# Patient Record
Sex: FEMALE | Race: WHITE | Employment: OTHER | URBAN - METROPOLITAN AREA
[De-identification: names, ages, dates, MRNs, and addresses within clinical notes are randomized per-mention and may not be internally consistent; named-entity substitution may affect disease eponyms.]

---

## 2017-02-13 ENCOUNTER — OFFICE VISIT (OUTPATIENT)
Dept: PRIMARY CARE CLINIC | Age: 67
End: 2017-02-13

## 2017-02-13 VITALS
HEIGHT: 64 IN | HEART RATE: 68 BPM | SYSTOLIC BLOOD PRESSURE: 120 MMHG | RESPIRATION RATE: 16 BRPM | TEMPERATURE: 98 F | OXYGEN SATURATION: 98 % | WEIGHT: 169 LBS | BODY MASS INDEX: 28.85 KG/M2 | DIASTOLIC BLOOD PRESSURE: 80 MMHG

## 2017-02-13 DIAGNOSIS — R07.2 PRECORDIAL PAIN: ICD-10-CM

## 2017-02-13 DIAGNOSIS — E78.2 MIXED HYPERLIPIDEMIA: ICD-10-CM

## 2017-02-13 DIAGNOSIS — F41.1 GAD (GENERALIZED ANXIETY DISORDER): Primary | ICD-10-CM

## 2017-02-13 PROCEDURE — G8420 CALC BMI NORM PARAMETERS: HCPCS | Performed by: FAMILY MEDICINE

## 2017-02-13 PROCEDURE — 4040F PNEUMOC VAC/ADMIN/RCVD: CPT | Performed by: FAMILY MEDICINE

## 2017-02-13 PROCEDURE — 99213 OFFICE O/P EST LOW 20 MIN: CPT | Performed by: FAMILY MEDICINE

## 2017-02-13 PROCEDURE — 1123F ACP DISCUSS/DSCN MKR DOCD: CPT | Performed by: FAMILY MEDICINE

## 2017-02-13 PROCEDURE — G8427 DOCREV CUR MEDS BY ELIG CLIN: HCPCS | Performed by: FAMILY MEDICINE

## 2017-02-13 PROCEDURE — 3014F SCREEN MAMMO DOC REV: CPT | Performed by: FAMILY MEDICINE

## 2017-02-13 PROCEDURE — 1090F PRES/ABSN URINE INCON ASSESS: CPT | Performed by: FAMILY MEDICINE

## 2017-02-13 PROCEDURE — G8484 FLU IMMUNIZE NO ADMIN: HCPCS | Performed by: FAMILY MEDICINE

## 2017-02-13 PROCEDURE — G8399 PT W/DXA RESULTS DOCUMENT: HCPCS | Performed by: FAMILY MEDICINE

## 2017-02-13 PROCEDURE — 3017F COLORECTAL CA SCREEN DOC REV: CPT | Performed by: FAMILY MEDICINE

## 2017-02-13 PROCEDURE — 1036F TOBACCO NON-USER: CPT | Performed by: FAMILY MEDICINE

## 2017-02-13 PROCEDURE — G8598 ASA/ANTIPLAT THER USED: HCPCS | Performed by: FAMILY MEDICINE

## 2017-02-13 ASSESSMENT — ENCOUNTER SYMPTOMS: ABDOMINAL PAIN: 1

## 2017-02-25 ASSESSMENT — ENCOUNTER SYMPTOMS
SHORTNESS OF BREATH: 0
COUGH: 0

## 2017-05-13 ENCOUNTER — OFFICE VISIT (OUTPATIENT)
Dept: PRIMARY CARE CLINIC | Age: 67
End: 2017-05-13

## 2017-05-13 VITALS
BODY MASS INDEX: 29.02 KG/M2 | TEMPERATURE: 97.2 F | SYSTOLIC BLOOD PRESSURE: 112 MMHG | OXYGEN SATURATION: 99 % | RESPIRATION RATE: 16 BRPM | WEIGHT: 170 LBS | HEART RATE: 63 BPM | HEIGHT: 64 IN | DIASTOLIC BLOOD PRESSURE: 64 MMHG

## 2017-05-13 DIAGNOSIS — E78.00 PURE HYPERCHOLESTEROLEMIA: Primary | ICD-10-CM

## 2017-05-13 DIAGNOSIS — L73.9 FOLLICULITIS: ICD-10-CM

## 2017-05-13 DIAGNOSIS — E55.9 VITAMIN D DEFICIENCY: ICD-10-CM

## 2017-05-13 DIAGNOSIS — F41.1 GAD (GENERALIZED ANXIETY DISORDER): ICD-10-CM

## 2017-05-13 DIAGNOSIS — B35.4 TINEA CORPORIS: ICD-10-CM

## 2017-05-13 DIAGNOSIS — E78.00 PURE HYPERCHOLESTEROLEMIA: ICD-10-CM

## 2017-05-13 LAB
ALBUMIN SERPL-MCNC: 4.5 G/DL (ref 3.9–4.9)
ALP BLD-CCNC: 76 U/L (ref 40–130)
ALT SERPL-CCNC: 17 U/L (ref 0–33)
ANION GAP SERPL CALCULATED.3IONS-SCNC: 10 MEQ/L (ref 7–13)
AST SERPL-CCNC: 18 U/L (ref 0–35)
BILIRUB SERPL-MCNC: 0.7 MG/DL (ref 0–1.2)
BUN BLDV-MCNC: 11 MG/DL (ref 8–23)
CALCIUM SERPL-MCNC: 9.5 MG/DL (ref 8.6–10.2)
CHLORIDE BLD-SCNC: 99 MEQ/L (ref 98–107)
CHOLESTEROL, TOTAL: 225 MG/DL (ref 0–199)
CO2: 28 MEQ/L (ref 22–29)
CREAT SERPL-MCNC: 0.57 MG/DL (ref 0.5–0.9)
GFR AFRICAN AMERICAN: >60
GFR NON-AFRICAN AMERICAN: >60
GLOBULIN: 2.6 G/DL (ref 2.3–3.5)
GLUCOSE BLD-MCNC: 92 MG/DL (ref 74–109)
HDLC SERPL-MCNC: 56 MG/DL (ref 40–59)
LDL CHOLESTEROL CALCULATED: 142 MG/DL (ref 0–129)
POTASSIUM SERPL-SCNC: 4.3 MEQ/L (ref 3.5–5.1)
SODIUM BLD-SCNC: 137 MEQ/L (ref 132–144)
TOTAL PROTEIN: 7.1 G/DL (ref 6.4–8.1)
TRIGL SERPL-MCNC: 136 MG/DL (ref 0–200)
VITAMIN D 25-HYDROXY: 19.8 NG/ML (ref 30–100)

## 2017-05-13 PROCEDURE — 4040F PNEUMOC VAC/ADMIN/RCVD: CPT | Performed by: FAMILY MEDICINE

## 2017-05-13 PROCEDURE — G8420 CALC BMI NORM PARAMETERS: HCPCS | Performed by: FAMILY MEDICINE

## 2017-05-13 PROCEDURE — 1090F PRES/ABSN URINE INCON ASSESS: CPT | Performed by: FAMILY MEDICINE

## 2017-05-13 PROCEDURE — G8427 DOCREV CUR MEDS BY ELIG CLIN: HCPCS | Performed by: FAMILY MEDICINE

## 2017-05-13 PROCEDURE — 3017F COLORECTAL CA SCREEN DOC REV: CPT | Performed by: FAMILY MEDICINE

## 2017-05-13 PROCEDURE — G8399 PT W/DXA RESULTS DOCUMENT: HCPCS | Performed by: FAMILY MEDICINE

## 2017-05-13 PROCEDURE — G8598 ASA/ANTIPLAT THER USED: HCPCS | Performed by: FAMILY MEDICINE

## 2017-05-13 PROCEDURE — 1036F TOBACCO NON-USER: CPT | Performed by: FAMILY MEDICINE

## 2017-05-13 PROCEDURE — 99213 OFFICE O/P EST LOW 20 MIN: CPT | Performed by: FAMILY MEDICINE

## 2017-05-13 PROCEDURE — 3014F SCREEN MAMMO DOC REV: CPT | Performed by: FAMILY MEDICINE

## 2017-05-13 PROCEDURE — 1123F ACP DISCUSS/DSCN MKR DOCD: CPT | Performed by: FAMILY MEDICINE

## 2017-05-13 RX ORDER — ATORVASTATIN CALCIUM 20 MG/1
20 TABLET, FILM COATED ORAL DAILY
Status: ON HOLD | COMMUNITY
Start: 2017-04-18 | End: 2017-12-05

## 2017-05-13 RX ORDER — CLOTRIMAZOLE AND BETAMETHASONE DIPROPIONATE 10; .64 MG/G; MG/G
CREAM TOPICAL
Qty: 60 G | Refills: 1 | Status: SHIPPED | OUTPATIENT
Start: 2017-05-13 | End: 2017-10-18

## 2017-05-13 RX ORDER — CEPHALEXIN 500 MG/1
CAPSULE ORAL
Qty: 40 CAPSULE | Refills: 1 | Status: SHIPPED | OUTPATIENT
Start: 2017-05-13 | End: 2017-10-18

## 2017-05-13 RX ORDER — LORAZEPAM 1 MG/1
TABLET ORAL
Qty: 90 TABLET | Refills: 1 | Status: SHIPPED | OUTPATIENT
Start: 2017-05-13 | End: 2017-07-18 | Stop reason: SDUPTHER

## 2017-05-13 RX ORDER — LORAZEPAM 1 MG/1
TABLET ORAL
Qty: 90 TABLET | Refills: 1 | Status: SHIPPED | OUTPATIENT
Start: 2017-05-13 | End: 2017-05-13 | Stop reason: SDUPTHER

## 2017-05-13 ASSESSMENT — ENCOUNTER SYMPTOMS
WHEEZING: 0
CHEST TIGHTNESS: 0
SHORTNESS OF BREATH: 0

## 2017-05-15 ENCOUNTER — TELEPHONE (OUTPATIENT)
Dept: PRIMARY CARE CLINIC | Age: 67
End: 2017-05-15

## 2017-05-18 ENCOUNTER — TELEPHONE (OUTPATIENT)
Dept: PRIMARY CARE CLINIC | Age: 67
End: 2017-05-18

## 2017-07-13 ENCOUNTER — TELEPHONE (OUTPATIENT)
Dept: PRIMARY CARE CLINIC | Age: 67
End: 2017-07-13

## 2017-07-13 RX ORDER — KETOROLAC TROMETHAMINE 10 MG/1
10 TABLET, FILM COATED ORAL EVERY 6 HOURS PRN
Qty: 20 TABLET | Refills: 0 | Status: ON HOLD | OUTPATIENT
Start: 2017-07-13 | End: 2017-12-05

## 2017-07-18 ENCOUNTER — OFFICE VISIT (OUTPATIENT)
Dept: PRIMARY CARE CLINIC | Age: 67
End: 2017-07-18

## 2017-07-18 VITALS
BODY MASS INDEX: 28.34 KG/M2 | HEART RATE: 60 BPM | DIASTOLIC BLOOD PRESSURE: 72 MMHG | TEMPERATURE: 96.9 F | WEIGHT: 166 LBS | RESPIRATION RATE: 16 BRPM | HEIGHT: 64 IN | SYSTOLIC BLOOD PRESSURE: 116 MMHG

## 2017-07-18 DIAGNOSIS — Z12.31 SCREENING MAMMOGRAM, ENCOUNTER FOR: ICD-10-CM

## 2017-07-18 DIAGNOSIS — M51.36 DDD (DEGENERATIVE DISC DISEASE), LUMBAR: ICD-10-CM

## 2017-07-18 DIAGNOSIS — F41.1 GAD (GENERALIZED ANXIETY DISORDER): ICD-10-CM

## 2017-07-18 DIAGNOSIS — Z98.1 S/P CERVICAL SPINAL FUSION: ICD-10-CM

## 2017-07-18 DIAGNOSIS — M50.30 DDD (DEGENERATIVE DISC DISEASE), CERVICAL: Primary | ICD-10-CM

## 2017-07-18 PROCEDURE — 1123F ACP DISCUSS/DSCN MKR DOCD: CPT | Performed by: FAMILY MEDICINE

## 2017-07-18 PROCEDURE — G8427 DOCREV CUR MEDS BY ELIG CLIN: HCPCS | Performed by: FAMILY MEDICINE

## 2017-07-18 PROCEDURE — 4040F PNEUMOC VAC/ADMIN/RCVD: CPT | Performed by: FAMILY MEDICINE

## 2017-07-18 PROCEDURE — 1036F TOBACCO NON-USER: CPT | Performed by: FAMILY MEDICINE

## 2017-07-18 PROCEDURE — G8399 PT W/DXA RESULTS DOCUMENT: HCPCS | Performed by: FAMILY MEDICINE

## 2017-07-18 PROCEDURE — G8598 ASA/ANTIPLAT THER USED: HCPCS | Performed by: FAMILY MEDICINE

## 2017-07-18 PROCEDURE — 99214 OFFICE O/P EST MOD 30 MIN: CPT | Performed by: FAMILY MEDICINE

## 2017-07-18 PROCEDURE — 3014F SCREEN MAMMO DOC REV: CPT | Performed by: FAMILY MEDICINE

## 2017-07-18 PROCEDURE — 3017F COLORECTAL CA SCREEN DOC REV: CPT | Performed by: FAMILY MEDICINE

## 2017-07-18 PROCEDURE — G8419 CALC BMI OUT NRM PARAM NOF/U: HCPCS | Performed by: FAMILY MEDICINE

## 2017-07-18 PROCEDURE — 1090F PRES/ABSN URINE INCON ASSESS: CPT | Performed by: FAMILY MEDICINE

## 2017-07-18 RX ORDER — NAPROXEN 500 MG/1
TABLET ORAL
Refills: 0 | COMMUNITY
Start: 2017-07-13 | End: 2017-11-14

## 2017-07-18 RX ORDER — LORAZEPAM 1 MG/1
TABLET ORAL
Qty: 90 TABLET | Refills: 1 | Status: SHIPPED | OUTPATIENT
Start: 2017-07-18 | End: 2017-10-18 | Stop reason: SDUPTHER

## 2017-07-18 ASSESSMENT — ENCOUNTER SYMPTOMS
CONSTIPATION: 0
PHOTOPHOBIA: 0
EYES NEGATIVE: 1
DIARRHEA: 0
SHORTNESS OF BREATH: 0
RESPIRATORY NEGATIVE: 1
WHEEZING: 0
ABDOMINAL PAIN: 0
BACK PAIN: 0
GASTROINTESTINAL NEGATIVE: 1
EYE REDNESS: 0
EYE ITCHING: 0

## 2017-10-18 ENCOUNTER — OFFICE VISIT (OUTPATIENT)
Dept: PRIMARY CARE CLINIC | Age: 67
End: 2017-10-18

## 2017-10-18 VITALS
RESPIRATION RATE: 18 BRPM | HEIGHT: 64 IN | BODY MASS INDEX: 27.83 KG/M2 | OXYGEN SATURATION: 99 % | TEMPERATURE: 97.9 F | DIASTOLIC BLOOD PRESSURE: 68 MMHG | SYSTOLIC BLOOD PRESSURE: 110 MMHG | HEART RATE: 78 BPM | WEIGHT: 163 LBS

## 2017-10-18 DIAGNOSIS — L30.9 ECZEMA, UNSPECIFIED TYPE: ICD-10-CM

## 2017-10-18 DIAGNOSIS — F41.1 GAD (GENERALIZED ANXIETY DISORDER): Primary | ICD-10-CM

## 2017-10-18 DIAGNOSIS — G40.909 NONINTRACTABLE EPILEPSY WITHOUT STATUS EPILEPTICUS, UNSPECIFIED EPILEPSY TYPE (HCC): ICD-10-CM

## 2017-10-18 DIAGNOSIS — Z23 NEED FOR INFLUENZA VACCINATION: ICD-10-CM

## 2017-10-18 PROCEDURE — 1090F PRES/ABSN URINE INCON ASSESS: CPT | Performed by: FAMILY MEDICINE

## 2017-10-18 PROCEDURE — 1036F TOBACCO NON-USER: CPT | Performed by: FAMILY MEDICINE

## 2017-10-18 PROCEDURE — 1123F ACP DISCUSS/DSCN MKR DOCD: CPT | Performed by: FAMILY MEDICINE

## 2017-10-18 PROCEDURE — 3017F COLORECTAL CA SCREEN DOC REV: CPT | Performed by: FAMILY MEDICINE

## 2017-10-18 PROCEDURE — G8399 PT W/DXA RESULTS DOCUMENT: HCPCS | Performed by: FAMILY MEDICINE

## 2017-10-18 PROCEDURE — G8417 CALC BMI ABV UP PARAM F/U: HCPCS | Performed by: FAMILY MEDICINE

## 2017-10-18 PROCEDURE — G8427 DOCREV CUR MEDS BY ELIG CLIN: HCPCS | Performed by: FAMILY MEDICINE

## 2017-10-18 PROCEDURE — G8598 ASA/ANTIPLAT THER USED: HCPCS | Performed by: FAMILY MEDICINE

## 2017-10-18 PROCEDURE — 99213 OFFICE O/P EST LOW 20 MIN: CPT | Performed by: FAMILY MEDICINE

## 2017-10-18 PROCEDURE — 3014F SCREEN MAMMO DOC REV: CPT | Performed by: FAMILY MEDICINE

## 2017-10-18 PROCEDURE — 90662 IIV NO PRSV INCREASED AG IM: CPT | Performed by: FAMILY MEDICINE

## 2017-10-18 PROCEDURE — G0008 ADMIN INFLUENZA VIRUS VAC: HCPCS | Performed by: FAMILY MEDICINE

## 2017-10-18 PROCEDURE — 4040F PNEUMOC VAC/ADMIN/RCVD: CPT | Performed by: FAMILY MEDICINE

## 2017-10-18 PROCEDURE — G8484 FLU IMMUNIZE NO ADMIN: HCPCS | Performed by: FAMILY MEDICINE

## 2017-10-18 RX ORDER — LORAZEPAM 1 MG/1
TABLET ORAL
Qty: 90 TABLET | Refills: 1 | Status: SHIPPED | OUTPATIENT
Start: 2017-10-18 | End: 2018-02-20 | Stop reason: SDUPTHER

## 2017-10-18 RX ORDER — CLOBETASOL PROPIONATE 0.5 MG/G
CREAM TOPICAL
Qty: 30 G | Refills: 1 | Status: SHIPPED | OUTPATIENT
Start: 2017-10-18 | End: 2018-03-22

## 2017-10-18 ASSESSMENT — ENCOUNTER SYMPTOMS
ABDOMINAL PAIN: 0
GASTROINTESTINAL NEGATIVE: 1
EYE PAIN: 0
BACK PAIN: 0
RHINORRHEA: 0
SORE THROAT: 0
VOMITING: 0
SHORTNESS OF BREATH: 0
PHOTOPHOBIA: 0
EYES NEGATIVE: 1
CONSTIPATION: 0
EYE ITCHING: 0
NAIL CHANGES: 0
RESPIRATORY NEGATIVE: 1
COUGH: 0
EYE REDNESS: 0
WHEEZING: 0
DIARRHEA: 0

## 2017-10-18 NOTE — PROGRESS NOTES
Subjective:      Patient ID: Shree Marcelino is a 78 y.o. female who presents today for:  Chief Complaint   Patient presents with    Rash     x over 1 month pt has c/o rash on her left ring finger that has not changed        Rash   This is a recurrent problem. The current episode started more than 1 month ago. The problem is unchanged. The affected locations include the left fingers. The rash is characterized by itchiness and redness. She was exposed to nothing. Pertinent negatives include no anorexia, congestion, cough, diarrhea, eye pain, facial edema, fatigue, fever, joint pain, nail changes, rhinorrhea, shortness of breath, sore throat or vomiting. Treatments tried: [place]. The treatment provided mild relief. Past Medical History:   Diagnosis Date    Allergic rhinitis, Dr Jose Mendez 10/28/2013    Anxiety     Back pain     CAD (coronary artery disease)     Cat bite involving extremity 11/5/2013    Cat bite involving extremity 11/5/2013    Cellulitis 11/5/2013    Cellulitis, improving 11/5/2013    Cervical radiculitis 1/13/2014    Cervical radiculitis, surgery pending 1/13/2014    CTS (carpal tunnel syndrome), Ortho 1/13/2014    Dyslipidemia 10/28/2013    Fatigue 10/7/2013    Headache(784.0)     H/O MIGRANES    Hernia, hiatal     H/O     Hx of cardiac cath x 2, normal, last 9/13 10/7/2013    Osteoarthritis     S/P cervical spinal fusion, 5-6 1/13/2014    Seizure disorder ([facility] [address]     Sinusitis 10/28/2013    Tobacco abuse, quit 9/3/13 10/7/2013    Tobacco abuse, quit 9/3/13      Past Surgical History:   Procedure Laterality Date    APPENDECTOMY      CARDIAC CATHETERIZATION  9/5/13    DR. SERRANO   [address] SURGERY  1995    fusion    CHOLECYSTECTOMY      COLONOSCOPY  2009    COSMETIC SURGERY  2010    on eye    TUBAL LIGATION  1976     Family History   Problem Relation Age of Onset    Heart Disease Father      MI    High Blood Pressure Father     Depression Mother      Social History     Social History    Marital status:      Spouse name: N/A    Number of children: N/A    Years of education: N/A     Occupational History    Not on file. Social History Main Topics    Smoking status: Former Smoker     Types: Cigarettes     Quit date: 9/6/2013    Smokeless tobacco: Never Used    Alcohol use No    Drug use: Unknown    Sexual activity: Not on file     Other Topics Concern    Not on file     Social History Narrative    No narrative on file     Allergies:  Keflex [cephalexin]; Nickel; Simvastatin; and Ultram [tramadol hcl]    Review of Systems   Constitutional: Negative. Negative for activity change, appetite change, fatigue and fever. HENT: Negative. Negative for congestion, rhinorrhea and sore throat. Eyes: Negative. Negative for photophobia, pain, redness and itching. Respiratory: Negative. Negative for cough, shortness of breath and wheezing. Cardiovascular: Negative. Gastrointestinal: Negative. Negative for abdominal pain, anorexia, constipation, diarrhea and vomiting. Genitourinary: Negative. Negative for hematuria, pelvic pain and urgency. Musculoskeletal: Positive for arthralgias. Negative for back pain and joint pain. Skin: Positive for rash. Negative for nail changes. Neurological: Negative. Psychiatric/Behavioral: Negative. Negative for agitation and behavioral problems. The patient is not nervous/anxious. Objective:   /68 (Site: Right Arm, Position: Sitting, Cuff Size: Medium Adult)   Pulse 78   Temp 97.9 °F (36.6 °C) (Tympanic)   Resp 18   Ht 5' 4\" (1.626 m)   Wt 163 lb (73.9 kg)   LMP  (LMP Unknown)   SpO2 99%   BMI 27.98 kg/m²     Physical Exam   Constitutional: She is oriented to person, place, and time. She appears well-developed and well-nourished. HENT:   Head: Normocephalic and atraumatic. Eyes: Conjunctivae and EOM are normal. Pupils are equal, round, and reactive to light.    Neck: Normal range of motion. Neck supple. No thyromegaly present. Cardiovascular: Normal rate, regular rhythm and normal heart sounds. No murmur heard. Pulmonary/Chest: Effort normal and breath sounds normal. She has no wheezes. She exhibits no tenderness. Abdominal: Soft. Bowel sounds are normal. There is no tenderness. Musculoskeletal: Normal range of motion. She exhibits no edema or tenderness. Lymphadenopathy:     She has no cervical adenopathy. Neurological: She is alert and oriented to person, place, and time. She has normal reflexes. Coordination normal.   Skin: Skin is warm and dry. Rash noted. There is erythema. Eczematous/scaling rash with papules noted to the left index finger   Psychiatric: She has a normal mood and affect. Thought content normal.   Nursing note and vitals reviewed. Assessment:     1. PADMINI (generalized anxiety disorder)  LORazepam (ATIVAN) 1 MG tablet   2. Need for influenza vaccination  INFLUENZA, HIGH DOSE, 65 YRS +, IM, PF, PREFILL SYR, 0.5ML (FLUZONE HD)   3. Eczema, unspecified type  clobetasol (TEMOVATE) 0.05 % cream       Plan:      Orders Placed This Encounter   Procedures    INFLUENZA, HIGH DOSE, 65 YRS +, IM, PF, PREFILL SYR, 0.5ML (FLUZONE HD)     Orders Placed This Encounter   Medications    LORazepam (ATIVAN) 1 MG tablet     Sig: One tid     Dispense:  90 tablet     Refill:  1    clobetasol (TEMOVATE) 0.05 % cream     Sig: Apply topically 2 times daily. Dispense:  30 g     Refill:  1       Controlled Substances Monitoring:      Return in about 6 weeks (around 11/29/2017) for Routine follow up, Review of [facility]. Tiki LAW ([facility]), am scribing for and in the presence of Drew Owusu DO. Electronically signed by :  Sarahy Tolentino ([address] [place] [address])      Drew LAW DO, personally performed the services described in this documentation, as scribed by Roula Deluna in my presence, and it is both accurate and complete.

## 2017-11-14 ENCOUNTER — OFFICE VISIT (OUTPATIENT)
Dept: PRIMARY CARE CLINIC | Age: 67
End: 2017-11-14

## 2017-11-14 VITALS
RESPIRATION RATE: 14 BRPM | HEART RATE: 64 BPM | BODY MASS INDEX: 27.66 KG/M2 | SYSTOLIC BLOOD PRESSURE: 110 MMHG | HEIGHT: 64 IN | TEMPERATURE: 97.9 F | WEIGHT: 162 LBS | DIASTOLIC BLOOD PRESSURE: 80 MMHG

## 2017-11-14 DIAGNOSIS — Z01.818 PRE-OP EXAM: Primary | ICD-10-CM

## 2017-11-14 DIAGNOSIS — M15.9 PRIMARY OSTEOARTHRITIS INVOLVING MULTIPLE JOINTS: ICD-10-CM

## 2017-11-14 DIAGNOSIS — F41.1 GAD (GENERALIZED ANXIETY DISORDER): ICD-10-CM

## 2017-11-14 DIAGNOSIS — D69.9 BLEEDING DISORDER (HCC): ICD-10-CM

## 2017-11-14 DIAGNOSIS — M17.12 PRIMARY OSTEOARTHRITIS OF LEFT KNEE: ICD-10-CM

## 2017-11-14 DIAGNOSIS — M51.36 DDD (DEGENERATIVE DISC DISEASE), LUMBAR: ICD-10-CM

## 2017-11-14 DIAGNOSIS — Z01.818 PRE-OP EXAM: ICD-10-CM

## 2017-11-14 LAB
ALBUMIN SERPL-MCNC: 4.7 G/DL (ref 3.9–4.9)
ALP BLD-CCNC: 70 U/L (ref 40–130)
ALT SERPL-CCNC: 11 U/L (ref 0–33)
ANION GAP SERPL CALCULATED.3IONS-SCNC: 18 MEQ/L (ref 7–13)
APTT: 28 SEC (ref 21.6–35.4)
AST SERPL-CCNC: 16 U/L (ref 0–35)
BASOPHILS ABSOLUTE: 0 K/UL (ref 0–0.2)
BASOPHILS RELATIVE PERCENT: 0.9 %
BILIRUB SERPL-MCNC: 0.6 MG/DL (ref 0–1.2)
BILIRUBIN, POC: NORMAL
BLOOD URINE, POC: NORMAL
BUN BLDV-MCNC: 10 MG/DL (ref 8–23)
C-REACTIVE PROTEIN: 0.9 MG/L (ref 0–5)
CALCIUM SERPL-MCNC: 9.8 MG/DL (ref 8.6–10.2)
CHLORIDE BLD-SCNC: 102 MEQ/L (ref 98–107)
CLARITY, POC: CLEAR
CO2: 23 MEQ/L (ref 22–29)
COLOR, POC: YELLOW
CREAT SERPL-MCNC: 0.61 MG/DL (ref 0.5–0.9)
EOSINOPHILS ABSOLUTE: 0.1 K/UL (ref 0–0.7)
EOSINOPHILS RELATIVE PERCENT: 1.7 %
GFR AFRICAN AMERICAN: >60
GFR NON-AFRICAN AMERICAN: >60
GLOBULIN: 2.5 G/DL (ref 2.3–3.5)
GLUCOSE BLD-MCNC: 99 MG/DL (ref 74–109)
GLUCOSE URINE, POC: NORMAL
HCT VFR BLD CALC: 39.7 % (ref 37–47)
HEMOGLOBIN: 13 G/DL (ref 12–16)
INR BLD: 1
KETONES, POC: NORMAL
LEUKOCYTE EST, POC: NORMAL
LYMPHOCYTES ABSOLUTE: 2.5 K/UL (ref 1–4.8)
LYMPHOCYTES RELATIVE PERCENT: 44.7 %
MCH RBC QN AUTO: 29.3 PG (ref 27–31.3)
MCHC RBC AUTO-ENTMCNC: 32.6 % (ref 33–37)
MCV RBC AUTO: 89.8 FL (ref 82–100)
MONOCYTES ABSOLUTE: 0.4 K/UL (ref 0.2–0.8)
MONOCYTES RELATIVE PERCENT: 7.1 %
NEUTROPHILS ABSOLUTE: 2.6 K/UL (ref 1.4–6.5)
NEUTROPHILS RELATIVE PERCENT: 45.6 %
NITRITE, POC: NORMAL
PDW BLD-RTO: 13.5 % (ref 11.5–14.5)
PH, POC: 6
PLATELET # BLD: 263 K/UL (ref 130–400)
POTASSIUM SERPL-SCNC: 4.4 MEQ/L (ref 3.5–5.1)
PROTEIN, POC: NORMAL
PROTHROMBIN TIME: 10.6 SEC (ref 8.1–13.7)
RBC # BLD: 4.42 M/UL (ref 4.2–5.4)
SODIUM BLD-SCNC: 143 MEQ/L (ref 132–144)
SPECIFIC GRAVITY, POC: 1.01
TOTAL PROTEIN: 7.2 G/DL (ref 6.4–8.1)
UROBILINOGEN, POC: NORMAL
WBC # BLD: 5.6 K/UL (ref 4.8–10.8)

## 2017-11-14 PROCEDURE — G8427 DOCREV CUR MEDS BY ELIG CLIN: HCPCS | Performed by: FAMILY MEDICINE

## 2017-11-14 PROCEDURE — 1123F ACP DISCUSS/DSCN MKR DOCD: CPT | Performed by: FAMILY MEDICINE

## 2017-11-14 PROCEDURE — G8399 PT W/DXA RESULTS DOCUMENT: HCPCS | Performed by: FAMILY MEDICINE

## 2017-11-14 PROCEDURE — 1090F PRES/ABSN URINE INCON ASSESS: CPT | Performed by: FAMILY MEDICINE

## 2017-11-14 PROCEDURE — G8484 FLU IMMUNIZE NO ADMIN: HCPCS | Performed by: FAMILY MEDICINE

## 2017-11-14 PROCEDURE — 4040F PNEUMOC VAC/ADMIN/RCVD: CPT | Performed by: FAMILY MEDICINE

## 2017-11-14 PROCEDURE — 99214 OFFICE O/P EST MOD 30 MIN: CPT | Performed by: FAMILY MEDICINE

## 2017-11-14 PROCEDURE — 3017F COLORECTAL CA SCREEN DOC REV: CPT | Performed by: FAMILY MEDICINE

## 2017-11-14 PROCEDURE — G8417 CALC BMI ABV UP PARAM F/U: HCPCS | Performed by: FAMILY MEDICINE

## 2017-11-14 PROCEDURE — 81003 URINALYSIS AUTO W/O SCOPE: CPT | Performed by: FAMILY MEDICINE

## 2017-11-14 PROCEDURE — 1036F TOBACCO NON-USER: CPT | Performed by: FAMILY MEDICINE

## 2017-11-14 PROCEDURE — G8598 ASA/ANTIPLAT THER USED: HCPCS | Performed by: FAMILY MEDICINE

## 2017-11-14 PROCEDURE — 3014F SCREEN MAMMO DOC REV: CPT | Performed by: FAMILY MEDICINE

## 2017-11-14 ASSESSMENT — ENCOUNTER SYMPTOMS
NAUSEA: 0
SINUS PRESSURE: 0
SHORTNESS OF BREATH: 0
ABDOMINAL PAIN: 0
RESPIRATORY NEGATIVE: 1
DIARRHEA: 0
SORE THROAT: 0
VOMITING: 0
COUGH: 0
CONSTIPATION: 0
BACK PAIN: 0
WHEEZING: 0

## 2017-11-14 NOTE — PROGRESS NOTES
Subjective:      Patient ID: Cheryl Munoz is a 78 y.o. female who presents today for:  Chief Complaint   Patient presents with    Pre-op Exam     Pt. is here for pre admission testing for a total knee replacement on her left knee with Dr. Dionne Chiang on 12/05/2017 at [facility]. Pt. had an EKG done last week with her Cardiologist Dr. Amy Koenig.  Immunizations     Pt. is questioning if she can receive the Pneumonia shot today. HPI  Patient presents in the office for a pre op exam. Patient is having a total left knee replacement  on 12/5/2017 with Dr. Dionne Chiang at [facility]   Patient states she has not had a heart attack in the past 6 months. Patient has not had blood transfusions. Patient has no problems with any type of anesthesia. Patient denies any rashes   Patient was counseled on medications to discontinue 10 days before and until specified after her surgery. Past Medical History:   Diagnosis Date    Allergic rhinitis, Dr Bocanegra Fore 10/28/2013    Anxiety     Back pain     CAD (coronary artery disease)     Cat bite involving extremity 11/5/2013    Cat bite involving extremity 11/5/2013    Cellulitis 11/5/2013    Cellulitis, improving 11/5/2013    Cervical radiculitis 1/13/2014    Cervical radiculitis, surgery pending 1/13/2014    CTS (carpal tunnel syndrome), Ortho 1/13/2014    Dyslipidemia 10/28/2013    Fatigue 10/7/2013    Headache(784.0)     H/O MIGRANES    Hernia, hiatal     H/O     Hx of cardiac cath x 2, normal, last 9/13 10/7/2013    Osteoarthritis     S/P cervical spinal fusion, 5-6 1/13/2014    Seizure disorder ([facility] [address]     Sinusitis 10/28/2013    Tobacco abuse, quit 9/3/13 10/7/2013    Tobacco abuse, quit 9/3/13      Past Surgical History:   Procedure Laterality Date    APPENDECTOMY      CARDIAC CATHETERIZATION  9/5/13    DR. SERRANO   [address] [facility] SURGERY  1995    fusion    CHOLECYSTECTOMY      COLONOSCOPY  2009    COSMETIC SURGERY  2010    on eye    TUBAL LIGATION 1976     Family History   Problem Relation Age of Onset    Heart Disease Father      MI    High Blood Pressure Father     Depression Mother      Social History     Social History    Marital status:      Spouse name: N/A    Number of children: N/A    Years of education: N/A     Occupational History    Not on file. Social History Main Topics    Smoking status: Former Smoker     Types: Cigarettes     Quit date: 9/6/2013    Smokeless tobacco: Never Used    Alcohol use No    Drug use: Unknown    Sexual activity: Not on file     Other Topics Concern    Not on file     Social History Narrative    No narrative on file     Allergies:  Keflex [cephalexin]; Nickel; Simvastatin; and Ultram [tramadol hcl]    Review of Systems   Constitutional: Negative for chills, fatigue and fever. HENT: Negative for congestion, ear pain, sinus pressure and sore throat. Respiratory: Negative. Negative for cough, shortness of breath and wheezing. Cardiovascular: Negative for chest pain and palpitations. Gastrointestinal: Negative for abdominal pain, constipation, diarrhea, nausea and vomiting. Musculoskeletal: Positive for arthralgias and gait problem. Negative for back pain, joint swelling and neck pain. Neurological: Negative for dizziness and headaches. Objective:   /80 (Site: Left Arm, Position: Sitting, Cuff Size: Large Adult)   Pulse 64   Temp 97.9 °F (36.6 °C) (Oral)   Resp 14   Ht 5' 4\" (1.626 m)   Wt 162 lb (73.5 kg)   LMP  (LMP Unknown)   BMI 27.81 kg/m²     Physical Exam   Constitutional: She is oriented to person, place, and time. She appears well-developed and well-nourished. HENT:   Head: Normocephalic and atraumatic. Eyes: Conjunctivae and EOM are normal. Pupils are equal, round, and reactive to light. Neck: Normal range of motion. Neck supple. No thyromegaly present. Cardiovascular: Normal rate, regular rhythm and normal heart sounds.     No murmur Return in about 3 months (around 2/14/2018) for Review of Labs & Diagnostic Testing, Routine follow up. I, Severo Bickers ([address] [place] [address])  , am scribing for and in the presence of Brenda Daniels DO. Electronically signed by :  Severo Bickers ([address] [place] [address])      Edel Hartley DO, personally performed the services described in this documentation, as scribed by Jerri Contreras in my presence, and it is both accurate and complete.  Electronically signed by: Brenda Daniels DO    11/14/17 10:30 PM    Brenda Daniels DO

## 2017-12-04 ENCOUNTER — ANESTHESIA EVENT (OUTPATIENT)
Dept: OPERATING ROOM | Age: 67
DRG: 470 | End: 2017-12-04
Payer: MEDICARE

## 2017-12-04 NOTE — ANESTHESIA PRE PROCEDURE
Department of Anesthesiology  Preprocedure Note       Name:  Radha Room   Age:  78 y.o.  :  1950                                          MRN:  [id number]         Date:  2017      Surgeon: Ramon Miller):  Jose Cotton MD    Procedure: Procedure(s):  LEFT TOTAL KNEE ARTHROPLASTY SUPINE BELEN PSI SPINAL (OUTSIDE PAT DR. OLIVA)    Medications prior to admission:   Prior to Admission medications    Medication Sig Start Date End Date Taking? Authorizing Provider   LORazepam (ATIVAN) 1 MG tablet One tid 10/18/17   Ellen Job, DO   clobetasol (TEMOVATE) 0.05 % cream Apply topically 2 times daily. 10/18/17   Ellen Job, DO   ketorolac (TORADOL) 10 MG tablet Take 1 tablet by mouth every 6 hours as needed for Pain 17  Ellen Job, DO   atorvastatin (LIPITOR) 20 MG tablet Take 20 mg by mouth daily 17   Historical Provider, MD   vitamin D (ERGOCALCIFEROL) 31184 UNITS CAPS capsule Take 1 capsule by mouth once a week. 4/16/15   Ellen Job, DO   aspirin 81 MG tablet Take 81 mg by mouth daily. Historical Provider, MD       Current medications:    No current facility-administered medications for this encounter. Current Outpatient Prescriptions   Medication Sig Dispense Refill    LORazepam (ATIVAN) 1 MG tablet One tid 90 tablet 1    clobetasol (TEMOVATE) 0.05 % cream Apply topically 2 times daily. 30 g 1    ketorolac (TORADOL) 10 MG tablet Take 1 tablet by mouth every 6 hours as needed for Pain 20 tablet 0    atorvastatin (LIPITOR) 20 MG tablet Take 20 mg by mouth daily      vitamin D (ERGOCALCIFEROL) 82818 UNITS CAPS capsule Take 1 capsule by mouth once a week. 12 capsule 0    aspirin 81 MG tablet Take 81 mg by mouth daily. Allergies:     Allergies   Allergen Reactions    Keflex [Cephalexin] Nausea Only    Nickel     Simvastatin      Other reaction(s): Unknown    Ultram [Tramadol Hcl]        Problem List:    Patient Active Problem List   Diagnosis Code  Osteoarthritis M19.90    Headache R51    Hernia, hiatal K44.9    Anxiety F41.9    FH: CAD (coronary artery disease) Z82.49    Hx of cardiac cath x 2, normal, last 9/13 Z98.890    Fatigue R53.83    Sinusitis J32.9    Allergic rhinitis, Dr Beatrice Arriola J30.9    Dyslipidemia E78.5    Cat bite involving extremity [id number]    Cellulitis, improving L03.90    CAD (coronary artery disease) I25.10    S/P cervical spinal fusion, 5-6 Z98.1    CTS (carpal tunnel syndrome), Ortho G56.00    Cervical radiculitis, surgery pending M54.12    History of tobacco use Z87.891    PADMINI (generalized anxiety disorder) F41.1    DDD (degenerative disc disease), lumbar M51.36       Past Medical History:        Diagnosis Date    Allergic rhinitis, Dr Beatrice Arriola 10/28/2013    Anxiety     Back pain     CAD (coronary artery disease)     Cat bite involving extremity 11/5/2013    Cat bite involving extremity 11/5/2013    Cellulitis 11/5/2013    Cellulitis, improving 11/5/2013    Cervical radiculitis 1/13/2014    Cervical radiculitis, surgery pending 1/13/2014    CTS (carpal tunnel syndrome), Ortho 1/13/2014    Dyslipidemia 10/28/2013    Fatigue 10/7/2013    Headache(784.0)     H/O MIGRANES    Hernia, hiatal     H/O     Hx of cardiac cath x 2, normal, last 9/13 10/7/2013    Osteoarthritis     S/P cervical spinal fusion, 5-6 1/13/2014    Seizure disorder ([address]     Sinusitis 10/28/2013    Tobacco abuse, quit 9/3/13 10/7/2013    Tobacco abuse, quit 9/3/13        Past Surgical History:        Procedure Laterality Date    APPENDECTOMY      CARDIAC CATHETERIZATION  9/5/13    DR. SERRANO   [address] [facility] SURGERY  1995    fusion    CHOLECYSTECTOMY      COLONOSCOPY  2009    COSMETIC SURGERY  2010    on eye    TUBAL LIGATION  1976       Social History:    Social History   Substance Use Topics    Smoking status: Former Smoker     Types: Cigarettes     Quit date: 9/6/2013    Smokeless tobacco: Never Used    Alcohol use No Counseling given: Not Answered      Vital Signs (Current): There were no vitals filed for this visit. BP Readings from Last 3 Encounters:   11/14/17 110/80   10/18/17 110/68   07/18/17 116/72       NPO Status:                                                                                 BMI:   Wt Readings from Last 3 Encounters:   11/14/17 162 lb (73.5 kg)   10/18/17 163 lb (73.9 kg)   07/18/17 166 lb (75.3 kg)     There is no height or weight on file to calculate BMI.    CBC:   Lab Results   Component Value Date    WBC 5.6 11/14/2017    RBC 4.42 11/14/2017    RBC 4.21 09/29/2011    HGB 13.0 11/14/2017    HCT 39.7 11/14/2017    MCV 89.8 11/14/2017    RDW 13.5 11/14/2017     11/14/2017       CMP:   Lab Results   Component Value Date     11/14/2017    K 4.4 11/14/2017     11/14/2017    CO2 23 11/14/2017    BUN 10 11/14/2017    CREATININE 0.61 11/14/2017    GFRAA >60.0 11/14/2017    LABGLOM >60.0 11/14/2017    GLUCOSE 99 11/14/2017    GLUCOSE 81 09/29/2011    PROT 7.2 11/14/2017    CALCIUM 9.8 11/14/2017    BILITOT 0.6 11/14/2017    ALKPHOS 70 11/14/2017    AST 16 11/14/2017    ALT 11 11/14/2017       POC Tests: No results for input(s): POCGLU, POCNA, POCK, POCCL, POCBUN, POCHEMO, POCHCT in the last 72 hours.     Coags:   Lab Results   Component Value Date    PROTIME 10.6 11/14/2017    PROTIME 9.7 09/30/2011    INR 1.0 11/14/2017    APTT 28.0 11/14/2017       HCG (If Applicable): No results found for: PREGTESTUR, PREGSERUM, HCG, HCGQUANT     ABGs: No results found for: PHART, PO2ART, EPN0BJT, QPD9BBA, BEART, I9JTPOJU     Type & Screen (If Applicable):  No results found for: [facility]    Anesthesia Evaluation  Patient summary reviewed and Nursing notes reviewed no history of anesthetic complications:   Airway: Mallampati: II  TM distance: >3 FB   Neck ROM: full  Mouth opening: > = 3 FB Dental:    (+) upper dentures

## 2017-12-05 ENCOUNTER — HOSPITAL ENCOUNTER (INPATIENT)
Age: 67
LOS: 3 days | Discharge: HOME | DRG: 470 | End: 2017-12-08
Attending: ORTHOPAEDIC SURGERY | Admitting: ORTHOPAEDIC SURGERY
Payer: MEDICARE

## 2017-12-05 ENCOUNTER — APPOINTMENT (OUTPATIENT)
Dept: GENERAL RADIOLOGY | Age: 67
DRG: 470 | End: 2017-12-05
Attending: ORTHOPAEDIC SURGERY
Payer: MEDICARE

## 2017-12-05 ENCOUNTER — ANESTHESIA (OUTPATIENT)
Dept: OPERATING ROOM | Age: 67
DRG: 470 | End: 2017-12-05
Payer: MEDICARE

## 2017-12-05 VITALS — TEMPERATURE: 95.4 F | DIASTOLIC BLOOD PRESSURE: 51 MMHG | OXYGEN SATURATION: 100 % | SYSTOLIC BLOOD PRESSURE: 96 MMHG

## 2017-12-05 LAB
ABO/RH: NORMAL
ANTIBODY SCREEN: NORMAL

## 2017-12-05 PROCEDURE — 6360000002 HC RX W HCPCS: Performed by: ANESTHESIOLOGY

## 2017-12-05 PROCEDURE — 2580000003 HC RX 258: Performed by: ORTHOPAEDIC SURGERY

## 2017-12-05 PROCEDURE — 51702 INSERT TEMP BLADDER CATH: CPT

## 2017-12-05 PROCEDURE — A6402 STERILE GAUZE <= 16 SQ IN: HCPCS | Performed by: ORTHOPAEDIC SURGERY

## 2017-12-05 PROCEDURE — 2580000003 HC RX 258: Performed by: STUDENT IN AN ORGANIZED HEALTH CARE EDUCATION/TRAINING PROGRAM

## 2017-12-05 PROCEDURE — 6360000002 HC RX W HCPCS: Performed by: ORTHOPAEDIC SURGERY

## 2017-12-05 PROCEDURE — 64445 NJX AA&/STRD SCIATIC NRV IMG: CPT | Performed by: ANESTHESIOLOGY

## 2017-12-05 PROCEDURE — 1210000000 HC MED SURG R&B

## 2017-12-05 PROCEDURE — 3700000001 HC ADD 15 MINUTES (ANESTHESIA): Performed by: ORTHOPAEDIC SURGERY

## 2017-12-05 PROCEDURE — 2580000003 HC RX 258: Performed by: NURSE ANESTHETIST, CERTIFIED REGISTERED

## 2017-12-05 PROCEDURE — 3700000000 HC ANESTHESIA ATTENDED CARE: Performed by: ORTHOPAEDIC SURGERY

## 2017-12-05 PROCEDURE — C1713 ANCHOR/SCREW BN/BN,TIS/BN: HCPCS | Performed by: ORTHOPAEDIC SURGERY

## 2017-12-05 PROCEDURE — 7100000000 HC PACU RECOVERY - FIRST 15 MIN: Performed by: ORTHOPAEDIC SURGERY

## 2017-12-05 PROCEDURE — 3600000014 HC SURGERY LEVEL 4 ADDTL 15MIN: Performed by: ORTHOPAEDIC SURGERY

## 2017-12-05 PROCEDURE — 6360000002 HC RX W HCPCS: Performed by: NURSE ANESTHETIST, CERTIFIED REGISTERED

## 2017-12-05 PROCEDURE — 7100000001 HC PACU RECOVERY - ADDTL 15 MIN: Performed by: ORTHOPAEDIC SURGERY

## 2017-12-05 PROCEDURE — 0SRD069 REPLACEMENT OF LEFT KNEE JOINT WITH OXIDIZED ZIRCONIUM ON POLYETHYLENE SYNTHETIC SUBSTITUTE, CEMENTED, OPEN APPROACH: ICD-10-PCS | Performed by: ORTHOPAEDIC SURGERY

## 2017-12-05 PROCEDURE — G8978 MOBILITY CURRENT STATUS: HCPCS

## 2017-12-05 PROCEDURE — 6370000000 HC RX 637 (ALT 250 FOR IP): Performed by: ORTHOPAEDIC SURGERY

## 2017-12-05 PROCEDURE — 73560 X-RAY EXAM OF KNEE 1 OR 2: CPT

## 2017-12-05 PROCEDURE — 86850 RBC ANTIBODY SCREEN: CPT

## 2017-12-05 PROCEDURE — 3600000004 HC SURGERY LEVEL 4 BASE: Performed by: ORTHOPAEDIC SURGERY

## 2017-12-05 PROCEDURE — 97530 THERAPEUTIC ACTIVITIES: CPT

## 2017-12-05 PROCEDURE — C1776 JOINT DEVICE (IMPLANTABLE): HCPCS | Performed by: ORTHOPAEDIC SURGERY

## 2017-12-05 PROCEDURE — 2500000003 HC RX 250 WO HCPCS: Performed by: NURSE ANESTHETIST, CERTIFIED REGISTERED

## 2017-12-05 PROCEDURE — 87086 URINE CULTURE/COLONY COUNT: CPT

## 2017-12-05 PROCEDURE — 86901 BLOOD TYPING SEROLOGIC RH(D): CPT

## 2017-12-05 PROCEDURE — G8979 MOBILITY GOAL STATUS: HCPCS

## 2017-12-05 PROCEDURE — 86900 BLOOD TYPING SEROLOGIC ABO: CPT

## 2017-12-05 PROCEDURE — 36415 COLL VENOUS BLD VENIPUNCTURE: CPT

## 2017-12-05 PROCEDURE — 97162 PT EVAL MOD COMPLEX 30 MIN: CPT

## 2017-12-05 PROCEDURE — 2720000010 HC SURG SUPPLY STERILE: Performed by: ORTHOPAEDIC SURGERY

## 2017-12-05 DEVICE — CEMENT BNE 20ML 40GM ACRYL RESIN HI VISC RADPQ FAST SET: Type: IMPLANTABLE DEVICE | Status: FUNCTIONAL

## 2017-12-05 DEVICE — IMPLANTABLE DEVICE: Type: IMPLANTABLE DEVICE | Status: FUNCTIONAL

## 2017-12-05 DEVICE — BONE SCREW 6.5X30 SELF-TAP: Type: IMPLANTABLE DEVICE | Status: FUNCTIONAL

## 2017-12-05 DEVICE — NEXGEN PRECOAT STEMMED TIBIAL PLATE SZ 4: Type: IMPLANTABLE DEVICE | Status: FUNCTIONAL

## 2017-12-05 RX ORDER — PROPOFOL 10 MG/ML
INJECTION, EMULSION INTRAVENOUS CONTINUOUS PRN
Status: DISCONTINUED | OUTPATIENT
Start: 2017-12-05 | End: 2017-12-05 | Stop reason: SDUPTHER

## 2017-12-05 RX ORDER — MIDAZOLAM HYDROCHLORIDE 1 MG/ML
INJECTION INTRAMUSCULAR; INTRAVENOUS PRN
Status: DISCONTINUED | OUTPATIENT
Start: 2017-12-05 | End: 2017-12-05 | Stop reason: SDUPTHER

## 2017-12-05 RX ORDER — SODIUM CHLORIDE 9 MG/ML
INJECTION, SOLUTION INTRAVENOUS
Status: DISPENSED
Start: 2017-12-05 | End: 2017-12-05

## 2017-12-05 RX ORDER — KETOROLAC TROMETHAMINE 15 MG/ML
15 INJECTION, SOLUTION INTRAMUSCULAR; INTRAVENOUS EVERY 6 HOURS
Status: COMPLETED | OUTPATIENT
Start: 2017-12-05 | End: 2017-12-05

## 2017-12-05 RX ORDER — DEXAMETHASONE SODIUM PHOSPHATE 10 MG/ML
INJECTION, SOLUTION INTRAMUSCULAR; INTRAVENOUS
Status: DISPENSED
Start: 2017-12-05 | End: 2017-12-05

## 2017-12-05 RX ORDER — MIDAZOLAM HYDROCHLORIDE 1 MG/ML
INJECTION INTRAMUSCULAR; INTRAVENOUS
Status: DISPENSED
Start: 2017-12-05 | End: 2017-12-05

## 2017-12-05 RX ORDER — POLYMYXIN B 500000 [USP'U]/1
INJECTION, POWDER, LYOPHILIZED, FOR SOLUTION INTRAMUSCULAR; INTRATHECAL; INTRAVENOUS; OPHTHALMIC
Status: DISCONTINUED
Start: 2017-12-05 | End: 2017-12-05 | Stop reason: WASHOUT

## 2017-12-05 RX ORDER — LIDOCAINE HYDROCHLORIDE 10 MG/ML
INJECTION, SOLUTION INFILTRATION; PERINEURAL
Status: DISPENSED
Start: 2017-12-05 | End: 2017-12-05

## 2017-12-05 RX ORDER — SODIUM CHLORIDE 0.9 % (FLUSH) 0.9 %
10 SYRINGE (ML) INJECTION PRN
Status: DISCONTINUED | OUTPATIENT
Start: 2017-12-05 | End: 2017-12-05 | Stop reason: HOSPADM

## 2017-12-05 RX ORDER — DEXAMETHASONE SODIUM PHOSPHATE 10 MG/ML
INJECTION INTRAMUSCULAR; INTRAVENOUS PRN
Status: DISCONTINUED | OUTPATIENT
Start: 2017-12-05 | End: 2017-12-05 | Stop reason: SDUPTHER

## 2017-12-05 RX ORDER — TRANEXAMIC ACID 650 1/1
1300 TABLET ORAL EVERY 8 HOURS
Status: COMPLETED | OUTPATIENT
Start: 2017-12-05 | End: 2017-12-05

## 2017-12-05 RX ORDER — ASPIRIN 81 MG/1
81 TABLET ORAL 2 TIMES DAILY
Status: DISCONTINUED | OUTPATIENT
Start: 2017-12-06 | End: 2017-12-08 | Stop reason: HOSPADM

## 2017-12-05 RX ORDER — DOCUSATE SODIUM 100 MG/1
100 CAPSULE, LIQUID FILLED ORAL 2 TIMES DAILY
Status: DISCONTINUED | OUTPATIENT
Start: 2017-12-05 | End: 2017-12-08 | Stop reason: HOSPADM

## 2017-12-05 RX ORDER — SODIUM CHLORIDE 9 MG/ML
INJECTION, SOLUTION INTRAVENOUS CONTINUOUS
Status: DISCONTINUED | OUTPATIENT
Start: 2017-12-05 | End: 2017-12-06

## 2017-12-05 RX ORDER — SENNA AND DOCUSATE SODIUM 50; 8.6 MG/1; MG/1
1 TABLET, FILM COATED ORAL DAILY
Status: DISCONTINUED | OUTPATIENT
Start: 2017-12-05 | End: 2017-12-08 | Stop reason: HOSPADM

## 2017-12-05 RX ORDER — SODIUM CHLORIDE 0.9 % (FLUSH) 0.9 %
10 SYRINGE (ML) INJECTION EVERY 12 HOURS SCHEDULED
Status: DISCONTINUED | OUTPATIENT
Start: 2017-12-05 | End: 2017-12-05 | Stop reason: HOSPADM

## 2017-12-05 RX ORDER — BISACODYL 10 MG
10 SUPPOSITORY, RECTAL RECTAL DAILY PRN
Status: DISCONTINUED | OUTPATIENT
Start: 2017-12-05 | End: 2017-12-08 | Stop reason: HOSPADM

## 2017-12-05 RX ORDER — LIDOCAINE HYDROCHLORIDE 10 MG/ML
INJECTION, SOLUTION INFILTRATION; PERINEURAL PRN
Status: DISCONTINUED | OUTPATIENT
Start: 2017-12-05 | End: 2017-12-05 | Stop reason: SDUPTHER

## 2017-12-05 RX ORDER — ROPIVACAINE HYDROCHLORIDE 5 MG/ML
INJECTION, SOLUTION EPIDURAL; INFILTRATION; PERINEURAL PRN
Status: DISCONTINUED | OUTPATIENT
Start: 2017-12-05 | End: 2017-12-05 | Stop reason: SDUPTHER

## 2017-12-05 RX ORDER — SODIUM CHLORIDE, SODIUM LACTATE, POTASSIUM CHLORIDE, CALCIUM CHLORIDE 600; 310; 30; 20 MG/100ML; MG/100ML; MG/100ML; MG/100ML
INJECTION, SOLUTION INTRAVENOUS CONTINUOUS
Status: DISCONTINUED | OUTPATIENT
Start: 2017-12-05 | End: 2017-12-06

## 2017-12-05 RX ORDER — SODIUM CHLORIDE 0.9 % (FLUSH) 0.9 %
10 SYRINGE (ML) INJECTION EVERY 12 HOURS SCHEDULED
Status: DISCONTINUED | OUTPATIENT
Start: 2017-12-05 | End: 2017-12-08 | Stop reason: HOSPADM

## 2017-12-05 RX ORDER — LORAZEPAM 0.5 MG/1
0.5 TABLET ORAL 3 TIMES DAILY PRN
Status: DISCONTINUED | OUTPATIENT
Start: 2017-12-05 | End: 2017-12-06

## 2017-12-05 RX ORDER — LIDOCAINE HYDROCHLORIDE 10 MG/ML
1 INJECTION, SOLUTION EPIDURAL; INFILTRATION; INTRACAUDAL; PERINEURAL
Status: DISCONTINUED | OUTPATIENT
Start: 2017-12-05 | End: 2017-12-05 | Stop reason: HOSPADM

## 2017-12-05 RX ORDER — ONDANSETRON 2 MG/ML
4 INJECTION INTRAMUSCULAR; INTRAVENOUS EVERY 6 HOURS PRN
Status: DISCONTINUED | OUTPATIENT
Start: 2017-12-05 | End: 2017-12-08 | Stop reason: HOSPADM

## 2017-12-05 RX ORDER — SODIUM CHLORIDE, SODIUM LACTATE, POTASSIUM CHLORIDE, CALCIUM CHLORIDE 600; 310; 30; 20 MG/100ML; MG/100ML; MG/100ML; MG/100ML
INJECTION, SOLUTION INTRAVENOUS CONTINUOUS PRN
Status: DISCONTINUED | OUTPATIENT
Start: 2017-12-05 | End: 2017-12-05 | Stop reason: SDUPTHER

## 2017-12-05 RX ORDER — ACETAMINOPHEN 325 MG/1
650 TABLET ORAL EVERY 4 HOURS PRN
Status: DISCONTINUED | OUTPATIENT
Start: 2017-12-05 | End: 2017-12-08 | Stop reason: HOSPADM

## 2017-12-05 RX ORDER — MAGNESIUM HYDROXIDE 1200 MG/15ML
LIQUID ORAL CONTINUOUS PRN
Status: DISCONTINUED | OUTPATIENT
Start: 2017-12-05 | End: 2017-12-05 | Stop reason: HOSPADM

## 2017-12-05 RX ORDER — ROPIVACAINE HYDROCHLORIDE 5 MG/ML
INJECTION, SOLUTION EPIDURAL; INFILTRATION; PERINEURAL
Status: DISPENSED
Start: 2017-12-05 | End: 2017-12-05

## 2017-12-05 RX ORDER — OXYCODONE HYDROCHLORIDE AND ACETAMINOPHEN 5; 325 MG/1; MG/1
2 TABLET ORAL EVERY 4 HOURS PRN
Status: DISCONTINUED | OUTPATIENT
Start: 2017-12-05 | End: 2017-12-07

## 2017-12-05 RX ORDER — OXYCODONE HYDROCHLORIDE AND ACETAMINOPHEN 5; 325 MG/1; MG/1
1 TABLET ORAL EVERY 4 HOURS PRN
Status: DISCONTINUED | OUTPATIENT
Start: 2017-12-05 | End: 2017-12-07

## 2017-12-05 RX ORDER — SODIUM CHLORIDE 0.9 % (FLUSH) 0.9 %
10 SYRINGE (ML) INJECTION PRN
Status: DISCONTINUED | OUTPATIENT
Start: 2017-12-05 | End: 2017-12-08 | Stop reason: HOSPADM

## 2017-12-05 RX ORDER — MORPHINE SULFATE 2 MG/ML
2 INJECTION, SOLUTION INTRAMUSCULAR; INTRAVENOUS
Status: DISCONTINUED | OUTPATIENT
Start: 2017-12-05 | End: 2017-12-07

## 2017-12-05 RX ORDER — MORPHINE SULFATE 4 MG/ML
4 INJECTION, SOLUTION INTRAMUSCULAR; INTRAVENOUS
Status: DISCONTINUED | OUTPATIENT
Start: 2017-12-05 | End: 2017-12-07

## 2017-12-05 RX ADMIN — DEXAMETHASONE SODIUM PHOSPHATE 10 MG: 10 INJECTION INTRAMUSCULAR; INTRAVENOUS at 07:38

## 2017-12-05 RX ADMIN — DOCUSATE SODIUM 100 MG: 100 CAPSULE, LIQUID FILLED ORAL at 21:12

## 2017-12-05 RX ADMIN — ONDANSETRON 4 MG: 2 INJECTION INTRAMUSCULAR; INTRAVENOUS at 13:34

## 2017-12-05 RX ADMIN — DOCUSATE SODIUM 100 MG: 100 CAPSULE, LIQUID FILLED ORAL at 12:34

## 2017-12-05 RX ADMIN — TRANEXAMIC ACID 1300 MG: 650 TABLET ORAL at 08:20

## 2017-12-05 RX ADMIN — PROPOFOL 75 MCG/KG/MIN: 10 INJECTION, EMULSION INTRAVENOUS at 07:46

## 2017-12-05 RX ADMIN — STANDARDIZED SENNA CONCENTRATE AND DOCUSATE SODIUM 1 TABLET: 8.6; 5 TABLET, FILM COATED ORAL at 12:34

## 2017-12-05 RX ADMIN — SODIUM CHLORIDE, POTASSIUM CHLORIDE, SODIUM LACTATE AND CALCIUM CHLORIDE: 600; 310; 30; 20 INJECTION, SOLUTION INTRAVENOUS at 08:15

## 2017-12-05 RX ADMIN — VANCOMYCIN HYDROCHLORIDE 1 G: 1 INJECTION, POWDER, LYOPHILIZED, FOR SOLUTION INTRAVENOUS at 07:55

## 2017-12-05 RX ADMIN — TRANEXAMIC ACID 1300 MG: 650 TABLET ORAL at 08:17

## 2017-12-05 RX ADMIN — LIDOCAINE HYDROCHLORIDE 5 ML: 10 INJECTION, SOLUTION INFILTRATION; PERINEURAL at 07:46

## 2017-12-05 RX ADMIN — SODIUM CHLORIDE, POTASSIUM CHLORIDE, SODIUM LACTATE AND CALCIUM CHLORIDE: 600; 310; 30; 20 INJECTION, SOLUTION INTRAVENOUS at 07:44

## 2017-12-05 RX ADMIN — PHENYLEPHRINE HYDROCHLORIDE 50 MCG: 10 INJECTION INTRAVENOUS at 08:40

## 2017-12-05 RX ADMIN — MIDAZOLAM HYDROCHLORIDE 4 MG: 1 INJECTION, SOLUTION INTRAMUSCULAR; INTRAVENOUS at 07:34

## 2017-12-05 RX ADMIN — MORPHINE SULFATE 4 MG: 4 INJECTION INTRAVENOUS at 13:34

## 2017-12-05 RX ADMIN — BUPIVACAINE HYDROCHLORIDE 2 ML: 5 INJECTION, SOLUTION EPIDURAL; INTRACAUDAL at 07:51

## 2017-12-05 RX ADMIN — VANCOMYCIN HYDROCHLORIDE 1000 MG: 1 INJECTION, POWDER, LYOPHILIZED, FOR SOLUTION INTRAVENOUS at 21:07

## 2017-12-05 RX ADMIN — ROPIVACAINE HYDROCHLORIDE 20 ML: 5 INJECTION, SOLUTION EPIDURAL; INFILTRATION; PERINEURAL at 07:38

## 2017-12-05 RX ADMIN — MORPHINE SULFATE 4 MG: 4 INJECTION INTRAVENOUS at 17:16

## 2017-12-05 RX ADMIN — Medication 10 ML: at 12:35

## 2017-12-05 RX ADMIN — KETOROLAC TROMETHAMINE 15 MG: 15 INJECTION, SOLUTION INTRAMUSCULAR; INTRAVENOUS at 12:34

## 2017-12-05 RX ADMIN — SODIUM CHLORIDE: 9 INJECTION, SOLUTION INTRAVENOUS at 12:35

## 2017-12-05 RX ADMIN — MORPHINE SULFATE 4 MG: 4 INJECTION INTRAVENOUS at 21:21

## 2017-12-05 RX ADMIN — KETOROLAC TROMETHAMINE 15 MG: 15 INJECTION, SOLUTION INTRAMUSCULAR; INTRAVENOUS at 17:08

## 2017-12-05 ASSESSMENT — PULMONARY FUNCTION TESTS
PIF_VALUE: 1
PIF_VALUE: 0
PIF_VALUE: 1
PIF_VALUE: 0
PIF_VALUE: 1
PIF_VALUE: 0
PIF_VALUE: 1

## 2017-12-05 ASSESSMENT — PAIN DESCRIPTION - ORIENTATION
ORIENTATION: LEFT

## 2017-12-05 ASSESSMENT — PAIN SCALES - GENERAL
PAINLEVEL_OUTOF10: 6
PAINLEVEL_OUTOF10: 7
PAINLEVEL_OUTOF10: 5
PAINLEVEL_OUTOF10: 7
PAINLEVEL_OUTOF10: 6
PAINLEVEL_OUTOF10: 5
PAINLEVEL_OUTOF10: 0
PAINLEVEL_OUTOF10: 5
PAINLEVEL_OUTOF10: 4
PAINLEVEL_OUTOF10: 8
PAINLEVEL_OUTOF10: 7
PAINLEVEL_OUTOF10: 2
PAINLEVEL_OUTOF10: 7

## 2017-12-05 ASSESSMENT — PAIN DESCRIPTION - LOCATION
LOCATION: KNEE

## 2017-12-05 ASSESSMENT — PAIN DESCRIPTION - PAIN TYPE
TYPE: SURGICAL PAIN

## 2017-12-05 ASSESSMENT — PAIN DESCRIPTION - DESCRIPTORS
DESCRIPTORS: SHARP

## 2017-12-05 ASSESSMENT — PAIN - FUNCTIONAL ASSESSMENT: PAIN_FUNCTIONAL_ASSESSMENT: 0-10

## 2017-12-05 NOTE — BRIEF OP NOTE
Brief Postoperative Note  ______________________________________________________________    Patient: Phill Stevens  YOB: 1950  MRN: [id number]  Date of Procedure: 12/5/2017    Pre-Op Diagnosis: LEFT KNEE DEGENERATIVE JOINT DISEASE    Post-Op Diagnosis: Same       Procedure(s):  LEFT TOTAL KNEE ARTHROPLASTY SUPINE BELEN PSI SPINAL (OUTSIDE [facility] DR. OLIVA)    Anesthesia: [unfilled]    Surgeon(s):  Jeffrey Stinson MD    Staff:  Scrub Person First: Early Mask  Physician Assistant: Dionicio Wolfe PA-C     Estimated Blood Loss: * No values recorded between 12/5/2017  7:45 AM and 12/5/2017  9:15 AM * None    Complications: None    Specimens:   ID Type Source Tests Collected by Time Destination   1 : urine Body Fluid Bladder URINE CULTURE, URINALYSIS Jeffrey Stinson MD 12/5/2017 1908        Implants:    Implant Name Type Inv.  Item Serial No.  Lot No. LRB No. Used   CEMENT FULL DOSE SIMPLEX HV Fastener CEMENT FULL DOSE SIMPLEX HV  JONEL: ORTHOPAED[employer] [id number] Left 2   FEMORAL COMPONENT     [id number] Left 1   STEMMED TIBIAL COMPONENT Knee IMPL KNEE TIB STEM NEXGEN SZ 4 [id number]  [employer] [id number] Left 1   PATELLA Knee IMPL KNEE NEXGEN PROLONG ALL POLY PATELLA  26MM  [employer] [id number] Left 1   ARTICULAR SURFACE Knee IMPL KNEE NEXGEN LPS FLEX FIXED PROLONG ART SAMINA G 3 4  10MM   [facility] [id number] Left 1         Drains:   Urethral Catheter Non-latex 16 fr (Active)   Catheter Indications Perioperative use in selected surgeries including but not limited to urologic, pelvic or need for intraoperative monitoring of urinary output due to prolonged surgery, large volume infusion or need for diuretic therapy in surgery 12/5/2017  7:50 AM   Urine Color Yellow 12/5/2017  7:50 AM   Urine Appearance Clear 12/5/2017  7:50 AM       Findings: none    Jeffrey Stinson MD  Date: 12/5/2017  Time: 9:15 AM

## 2017-12-05 NOTE — ANESTHESIA PROCEDURE NOTES
Peripheral Block    Patient location during procedure: pre-op  Start time: 12/5/2017 7:34 AM  End time: 12/5/2017 7:39 AM  Staffing  Anesthesiologist: Mitzy Iraheta  Performed: anesthesiologist   Preanesthetic Checklist  Completed: patient identified, site marked, surgical consent, pre-op evaluation, timeout performed, IV checked, risks and benefits discussed, monitors and equipment checked, anesthesia consent given, oxygen available and patient being monitored  Peripheral Block  Patient position: supine  Prep: ChloraPrep  Patient monitoring: cardiac monitor, continuous pulse ox, frequent blood pressure checks and IV access  Block type: Sciatic and Saphenous  Laterality: left  Injection technique: single-shot  Procedures: ultrasound guided and Patient supine with leg externally rotated. Subsartorial technique with lateral, in-plane approach  Local infiltration: lidocaine  Infiltration strength: 1 %  Dose: 2 mL  Provider prep: mask and sterile gloves  Local infiltration: lidocaine  Needle  Needle type: combined needle/nerve stimulator   Needle gauge: 21 G  Needle length: 10 cm  Needle localization: ultrasound guidance  Assessment  Injection assessment: negative aspiration for heme, no paresthesia on injection and local visualized surrounding nerve on ultrasound  Paresthesia pain: none  Slow fractionated injection: yes  Hemodynamics: stable  Additional Notes  Total of 20cc of 0.5% ropivicaine with 10mg decadron injected.   Reason for block: post-op pain management and at surgeon's request

## 2017-12-05 NOTE — PROGRESS NOTES
Chart reviewed. Patient was admitted to [facility] after [place]. This  met with patient to discuss DC planning. Upon visit patient is alert and sitting up in recliner chair. Patient appears well groomed and dressed in hospital attire. Patient is alert and oriented to person, place, and situation. Patient reports living home alone and reports that bed room and bath are on second level. Patient reports that she desires going to a SNF to receive therapies upon DC from [facility]. Patient reports that she met with Katelyn Villarreal in admissions department at [facility] prior to surgery. Patient reports desire to transfer to [facility] upon [place] from [facility].  It is worth noting that patient was evaluated by PT at [facility] and PT yara noted recommended short [facility] stay due to pt living alone and bed and bathroom on 2nd floor and laundry in basement. This  collaborated with Katelyn Villarreal in admissions department at [facility]. Katelyn Villarreal reports that patient requires a 3 midnight stay before patient would be able to transfer to a SNF. Katelyn Villarreal requesting patient's information be faxed over on 12/6 for review. SS to follow.

## 2017-12-05 NOTE — PROGRESS NOTES
Patient admitted to floor with left total knee replacement, following commands, a/o x4. Able to make needs known, assessment as charted, dressing dry and intact. Polar pack in place. Call light in reach.

## 2017-12-05 NOTE — PROGRESS NOTES
Physical Therapy    Facility/Department: [facility] MED SURG UNIT  Initial Assessment    NAME: Wayne Ross  : 1950  MRN: [id number]    Date of Service: 2017    Patient Diagnosis(es): There were no encounter diagnoses. has a past medical history of Allergic rhinitis, Dr Lo Newton; Anxiety; Back pain; CAD (coronary artery disease); Cat bite involving extremity; Cat bite involving extremity; Cellulitis; Cellulitis, improving; Cervical radiculitis; Cervical radiculitis, surgery pending; CTS (carpal tunnel syndrome), Ortho; Dyslipidemia; Fatigue; Headache(784.0); Hernia, hiatal; Hx of cardiac cath x 2, normal, last ; Osteoarthritis; S/P cervical spinal fusion, 5-6; Seizure disorder ([facility] [address] Sinusitis; Tobacco abuse, quit 9/3/13; and Tobacco abuse, quit 9/3/13. has a past surgical history that includes Appendectomy; Colonoscopy (); Cosmetic surgery (); Tubal ligation (); Cervical disc surgery (); Cardiac catheterization (13); Cholecystectomy; and total knee arthroplasty (Left, 2017).     Restrictions  Restrictions/Precautions  Restrictions/Precautions: Weight Bearing  Required Braces or Orthoses?: Yes  Lower Extremity Weight Bearing Restrictions  Left Lower Extremity Weight Bearing: Weight Bearing As Tolerated  Required Braces or Orthoses  Left Lower Extremity Brace:  (Knee immobilizer to be worn until able to perform SLR without quad lag)  Vision/Hearing  Vision: Impaired  Vision Exceptions: Wears glasses for reading     Subjective  General  Chart Reviewed: Yes  Pain Screening  Patient Currently in Pain: Denies  Pain Assessment  Pain Assessment: 0-10  Pain Level: 5  Pain Type: Surgical pain  Pain Location: Knee  Pain Orientation: Left  Pain Descriptors: Sharp     Orientation  Orientation  Overall Orientation Status: Within Normal Limits    Social/Functional History  Social/Functional History  Lives With: Alone  Type of Home: House  Home Layout: Two level, Bed/Bath upstairs (laundry in basement)  Home Access: Stairs to enter with rails  Entrance Stairs - Number of Steps: 4  Entrance Stairs - Rails: Left  Bathroom Shower/Tub: Tub/Shower unit, Shower chair with back  Bathroom Toilet: Handicap height  Home Equipment: Rolling walker, 4 wheeled walker, Cane  ADL Assistance: Independent  Homemaking Assistance: Independent  Homemaking Responsibilities: Yes  Ambulation Assistance: Independent  Active : Yes  Objective     AROM RLE (degrees)  RLE AROM: WNL  AROM LLE (degrees)  LLE General AROM: L knee AROM not formally assessed due to bulky dressing in place  Strength RLE  Strength RLE: WFL  Strength LLE  Strength LLE: Exception  Comment: L knee strength grossly 2/5  Strength RUE  Comment: See OT eval  Strength LUE  Comment: See OT eval     Sensation  Overall Sensation Status: Impaired (complaints of numbness bilat LEs L >R post surgery)  Bed mobility  Supine to Sit: Minimal assistance (for L LE assist with HOB elevated and use of bed rails)  Transfers  Stand to sit: Minimal Assistance (verbal cues for hand placement)  Ambulation  Ambulation?: Yes  Ambulation 1  Surface: level tile  Device: Rolling Walker  Other Apparatus: Knee Immobilizer  Assistance: Contact guard assistance;Minimal assistance  Quality of Gait: slow antalgic, step to gait pattern with vcs for sequencing and min A for maneuvering ww during turn  Distance: 8-10 steps bed to chair      Exercises  Comments: Pt instructed in APs, quad sets and glut sets     Assessment   Body structures, Functions, Activity limitations: Decreased functional mobility ; Decreased ROM; Decreased strength;Decreased balance;Decreased endurance  Assessment: 78 yr old S/P L TKA surgery who lives alone with bedroom and bathroom on 2nd floor and 4 steps to enter home currently requiring assist for bed mobility, transfers and gait presenting with decreased ROM and strength L LE and would benefit from 1-2 wks short term rehab stay   Treatment Diagnosis: S/P L TKA,

## 2017-12-05 NOTE — CONSULTS
Consults       Consult      Patient:  Valerie Short  YOB: 1950    MRN: [id number]     Acct: [de-identified]    Primary Care Physician: Brenda Daniels DO    HISTORY OF PRESENT ILLNESS:   History obtained from chart review and the patient. The patient is a 78 y.o. female whom I have been requested to see by Dr. Ralph Sethi for evaluation of HLP, anxiety. Pt seen in postoperative period day 0. Looks comfortable, has no active complains    Past Medical History:        Diagnosis Date    Allergic rhinitis, Dr Ericka Huizar 10/28/2013    Anxiety     Back pain     CAD (coronary artery disease)     Cat bite involving extremity 11/5/2013    Cat bite involving extremity 11/5/2013    Cellulitis 11/5/2013    Cellulitis, improving 11/5/2013    Cervical radiculitis 1/13/2014    Cervical radiculitis, surgery pending 1/13/2014    CTS (carpal tunnel syndrome), Ortho 1/13/2014    Dyslipidemia 10/28/2013    Fatigue 10/7/2013    Headache(784.0)     H/O MIGRANES    Hernia, hiatal     H/O     Hx of cardiac cath x 2, normal, last 9/13 10/7/2013    Osteoarthritis     S/P cervical spinal fusion, 5-6 1/13/2014    Seizure disorder ([facility] [address]     Sinusitis 10/28/2013    Tobacco abuse, quit 9/3/13 10/7/2013    Tobacco abuse, quit 9/3/13        Past Surgical History:        Procedure Laterality Date    APPENDECTOMY      CARDIAC CATHETERIZATION  9/5/13    DR. SERRANO   [facility] SURGERY  1995    fusion    CHOLECYSTECTOMY      COLONOSCOPY  2009    COSMETIC SURGERY  2010    on eye    TUBAL LIGATION  1976       Medications:    No current facility-administered medications on file prior to encounter. Current Outpatient Prescriptions on File Prior to Encounter   Medication Sig Dispense Refill    LORazepam (ATIVAN) 1 MG tablet One tid 90 tablet 1    clobetasol (TEMOVATE) 0.05 % cream Apply topically 2 times daily. 30 g 1    vitamin D (ERGOCALCIFEROL) 27993 UNITS CAPS capsule Take 1 capsule by mouth once a week.  12 No JVD. Chest: Bilateal air entry, Clear to auscultation, no wheezing, rhonchi or rales. Cardiovascular: RRR, X0L8, no murmur, click, rub or gallop. No lower extremity edema. Pedal and posterior tibialis 2+. Abdomen: Soft, non tender to palpation. Active bowel sounds x 4 quadrants. Musculoskeletal: L knee postoperative dressing   Integumentary: Pink, warm and dry. Free from rash or lesions. CNS: Oriented to person, place and time. Cranial nerves 2-12 grossly intact. Speech clear. Face symmetrical. No tremor. Review of Labs and Diagnostic Testing:  Assessment:    Principal Problem:    Osteoarthritis  Active Problems:    Anxiety          Plan:  1. POD x0- pain controlled, PT pending  2. HLP- pt prefer to hold on statins  3. Bradycardia- asymptomatic- continue to monitor, continue with IV hydration  4. History of smoking- no dyspnea or SOB   5. Will follow along with primary service     Thank you Josemanuel Bond for allowing me to participate in this patient's care.       Electronically signed by Maxwell Stearns MD on 12/5/2017 at [address] [facility]

## 2017-12-05 NOTE — PLAN OF CARE
Problem: Falls - Risk of:  Goal: Will remain free from falls  Will remain free from falls  Outcome: Ongoing    Goal: Absence of physical injury  Absence of physical injury  Outcome: Ongoing      Problem: Mobility - Impaired:  Goal: Mobility will improve  Mobility will improve  Outcome: Ongoing

## 2017-12-05 NOTE — OP NOTE
[address] [place] [address], [address] [place] [address] Pkwy                                 OPERATIVE REPORT    PATIENT NAME: Tejinder Hernandez                   :        1950  MED REC NO:   [id number]                              ROOM:  ACCOUNT NO:   [de-identified]                           ADMIT DATE: 2017  PROVIDER:     Judge Maurice MD    DATE OF PROCEDURE:  2017    PREOPERATIVE DIAGNOSIS:  Left knee DJD. POSTOPERATIVE DIAGNOSIS:  Left knee DJD. PROCEDURES:  1. Left total knee arthroplasty using a Tasneem size G NexGen LPS-Flex  nickel-free cemented femoral component, 10 mL NexGen Legacy posterior  stabilized polyethylene pacer with size 4 NexGen cemented tibial tray and  26 mm NexGen cemented _____ polyethylene patella. 2.  Utilization of Tasneem PSI computer alignment system. SURGEON:  Judge Maurice MD    ASSISTANT:  Gladys Olvera PA-C    ANESTHETIC:  Spinal plus IV sedation. COMPLICATIONS:  None. INDICATIONS:  The patient is a 79-year-old female with history of left knee  arthritis. She has failed multiple conservative measures and elected to  proceed with total knee arthroplasty. Risks and benefits of knee  replacement noted with the patient and family. These include infection,  stiffness, nerve damage, and continued pain as well as need for subsequent  operations. She had a NICKEL allergy and wished to pursue nickel-free knee  replacement options. Understanding these options and limitations, she  elected to proceed. Informed consent was obtained prior to arrival in the  operating room. Gladys Olvera PA-C was present throughout the entire case. Given the  nature of the disease process and the procedure, a skilled surgical first  assistant was necessary during the case. The assistant was necessary to  hold retractors and manipulate the extremity during the procedure.   A  certified scrub tech was at the back into position. With the guide in position, the  anterior, posterior and chamfer cuts were made without difficulty. Once  the femoral cuts were complete, a posterior retractor was inserted. The  medial and lateral tibial retractors were inserted and the meniscal  remnants were excised. The Tasneem custom PSI cutting pin guide for the  tibia was pinned in position. The guide was removed and the tibial  resection was made after checking the extramedullary alignment with the  extramedullary alignment device. Once the tibial resection was complete, the tibial components were  inserted. Patellar reaming was performed. Peg holes were drilled in the  patella and a trial patella was applied. The knee was placed through a  range of motion and once appropriate stability was obtained, trial  components were removed. All bony surfaces were irrigated with copious amounts of saline irrigation. The components were inserted as indicated. A deep drain placed in the knee  joint. The capsule was closed using interrupted #1, the subcutaneous  tissues were closed using #3-0 Monocryl and staples were then used for the  skin. Dry sterile bulky dressing applied. The patient tolerated the  procedure well, taken to the PACU good condition without complication.         Gabo Ma MD    D: 12/05/2017 9:19:31       T: 12/05/2017 10:17:25     [email or web address]  Job#: [id number]     Doc#: [id number]    CC:

## 2017-12-06 LAB
ANION GAP SERPL CALCULATED.3IONS-SCNC: 11 MEQ/L (ref 7–13)
BUN BLDV-MCNC: 14 MG/DL (ref 8–23)
CALCIUM SERPL-MCNC: 8.8 MG/DL (ref 8.6–10.2)
CHLORIDE BLD-SCNC: 104 MEQ/L (ref 98–107)
CO2: 26 MEQ/L (ref 22–29)
CREAT SERPL-MCNC: 0.58 MG/DL (ref 0.5–0.9)
GFR AFRICAN AMERICAN: >60
GFR NON-AFRICAN AMERICAN: >60
GLUCOSE BLD-MCNC: 127 MG/DL (ref 74–109)
HCT VFR BLD CALC: 28.8 % (ref 37–47)
HEMOGLOBIN: 9.7 G/DL (ref 12–16)
MCH RBC QN AUTO: 29.9 PG (ref 27–31.3)
MCHC RBC AUTO-ENTMCNC: 33.8 % (ref 33–37)
MCV RBC AUTO: 88.4 FL (ref 82–100)
PDW BLD-RTO: 13.2 % (ref 11.5–14.5)
PLATELET # BLD: 208 K/UL (ref 130–400)
POTASSIUM SERPL-SCNC: 4.3 MEQ/L (ref 3.5–5.1)
RBC # BLD: 3.25 M/UL (ref 4.2–5.4)
SODIUM BLD-SCNC: 141 MEQ/L (ref 132–144)
WBC # BLD: 9 K/UL (ref 4.8–10.8)

## 2017-12-06 PROCEDURE — 97535 SELF CARE MNGMENT TRAINING: CPT

## 2017-12-06 PROCEDURE — 97116 GAIT TRAINING THERAPY: CPT

## 2017-12-06 PROCEDURE — 6360000002 HC RX W HCPCS: Performed by: INTERNAL MEDICINE

## 2017-12-06 PROCEDURE — 97166 OT EVAL MOD COMPLEX 45 MIN: CPT

## 2017-12-06 PROCEDURE — 97140 MANUAL THERAPY 1/> REGIONS: CPT

## 2017-12-06 PROCEDURE — 36415 COLL VENOUS BLD VENIPUNCTURE: CPT

## 2017-12-06 PROCEDURE — 6360000002 HC RX W HCPCS: Performed by: ORTHOPAEDIC SURGERY

## 2017-12-06 PROCEDURE — 6370000000 HC RX 637 (ALT 250 FOR IP): Performed by: INTERNAL MEDICINE

## 2017-12-06 PROCEDURE — 97110 THERAPEUTIC EXERCISES: CPT

## 2017-12-06 PROCEDURE — G8987 SELF CARE CURRENT STATUS: HCPCS

## 2017-12-06 PROCEDURE — 1210000000 HC MED SURG R&B

## 2017-12-06 PROCEDURE — 2580000003 HC RX 258: Performed by: ORTHOPAEDIC SURGERY

## 2017-12-06 PROCEDURE — G8988 SELF CARE GOAL STATUS: HCPCS

## 2017-12-06 PROCEDURE — 6370000000 HC RX 637 (ALT 250 FOR IP): Performed by: ORTHOPAEDIC SURGERY

## 2017-12-06 PROCEDURE — 80048 BASIC METABOLIC PNL TOTAL CA: CPT

## 2017-12-06 PROCEDURE — 85027 COMPLETE CBC AUTOMATED: CPT

## 2017-12-06 PROCEDURE — 97530 THERAPEUTIC ACTIVITIES: CPT

## 2017-12-06 RX ORDER — KETOROLAC TROMETHAMINE 15 MG/ML
15 INJECTION, SOLUTION INTRAMUSCULAR; INTRAVENOUS EVERY 6 HOURS
Status: DISCONTINUED | OUTPATIENT
Start: 2017-12-06 | End: 2017-12-06

## 2017-12-06 RX ORDER — LORAZEPAM 1 MG/1
1 TABLET ORAL 3 TIMES DAILY PRN
Status: DISCONTINUED | OUTPATIENT
Start: 2017-12-06 | End: 2017-12-08 | Stop reason: HOSPADM

## 2017-12-06 RX ORDER — GABAPENTIN 100 MG/1
100 CAPSULE ORAL 3 TIMES DAILY
Status: DISCONTINUED | OUTPATIENT
Start: 2017-12-06 | End: 2017-12-07

## 2017-12-06 RX ORDER — KETOROLAC TROMETHAMINE 15 MG/ML
15 INJECTION, SOLUTION INTRAMUSCULAR; INTRAVENOUS EVERY 6 HOURS PRN
Status: DISCONTINUED | OUTPATIENT
Start: 2017-12-06 | End: 2017-12-08 | Stop reason: HOSPADM

## 2017-12-06 RX ADMIN — LORAZEPAM 0.5 MG: 0.5 TABLET ORAL at 05:58

## 2017-12-06 RX ADMIN — MORPHINE SULFATE 4 MG: 4 INJECTION INTRAVENOUS at 20:54

## 2017-12-06 RX ADMIN — LORAZEPAM 0.5 MG: 0.5 TABLET ORAL at 00:17

## 2017-12-06 RX ADMIN — KETOROLAC TROMETHAMINE 15 MG: 15 INJECTION, SOLUTION INTRAMUSCULAR; INTRAVENOUS at 09:48

## 2017-12-06 RX ADMIN — KETOROLAC TROMETHAMINE 15 MG: 15 INJECTION, SOLUTION INTRAMUSCULAR; INTRAVENOUS at 15:50

## 2017-12-06 RX ADMIN — STANDARDIZED SENNA CONCENTRATE AND DOCUSATE SODIUM 1 TABLET: 8.6; 5 TABLET, FILM COATED ORAL at 09:36

## 2017-12-06 RX ADMIN — LORAZEPAM 1 MG: 1 TABLET ORAL at 20:32

## 2017-12-06 RX ADMIN — DOCUSATE SODIUM 100 MG: 100 CAPSULE, LIQUID FILLED ORAL at 20:32

## 2017-12-06 RX ADMIN — ONDANSETRON 4 MG: 2 INJECTION INTRAMUSCULAR; INTRAVENOUS at 09:44

## 2017-12-06 RX ADMIN — GABAPENTIN 100 MG: 100 CAPSULE ORAL at 09:44

## 2017-12-06 RX ADMIN — Medication 10 ML: at 15:50

## 2017-12-06 RX ADMIN — ASPIRIN 81 MG: 81 TABLET, COATED ORAL at 20:32

## 2017-12-06 RX ADMIN — DOCUSATE SODIUM 100 MG: 100 CAPSULE, LIQUID FILLED ORAL at 09:36

## 2017-12-06 RX ADMIN — GABAPENTIN 100 MG: 100 CAPSULE ORAL at 14:18

## 2017-12-06 RX ADMIN — GABAPENTIN 100 MG: 100 CAPSULE ORAL at 20:32

## 2017-12-06 RX ADMIN — ONDANSETRON 4 MG: 2 INJECTION INTRAMUSCULAR; INTRAVENOUS at 20:54

## 2017-12-06 RX ADMIN — MORPHINE SULFATE 4 MG: 4 INJECTION INTRAVENOUS at 00:17

## 2017-12-06 RX ADMIN — Medication 10 ML: at 09:47

## 2017-12-06 RX ADMIN — Medication 10 ML: at 09:44

## 2017-12-06 RX ADMIN — ASPIRIN 81 MG: 81 TABLET, COATED ORAL at 09:36

## 2017-12-06 RX ADMIN — MORPHINE SULFATE 4 MG: 4 INJECTION INTRAVENOUS at 05:59

## 2017-12-06 ASSESSMENT — PAIN DESCRIPTION - DESCRIPTORS
DESCRIPTORS: ACHING;TIGHTNESS
DESCRIPTORS: THROBBING
DESCRIPTORS: THROBBING

## 2017-12-06 ASSESSMENT — PAIN DESCRIPTION - LOCATION
LOCATION: KNEE

## 2017-12-06 ASSESSMENT — PAIN SCALES - GENERAL
PAINLEVEL_OUTOF10: 10
PAINLEVEL_OUTOF10: 5
PAINLEVEL_OUTOF10: 5
PAINLEVEL_OUTOF10: 7
PAINLEVEL_OUTOF10: 10
PAINLEVEL_OUTOF10: 10
PAINLEVEL_OUTOF10: 6
PAINLEVEL_OUTOF10: 5
PAINLEVEL_OUTOF10: 3
PAINLEVEL_OUTOF10: 5
PAINLEVEL_OUTOF10: 10
PAINLEVEL_OUTOF10: 4

## 2017-12-06 ASSESSMENT — PAIN DESCRIPTION - PAIN TYPE
TYPE: SURGICAL PAIN

## 2017-12-06 ASSESSMENT — PAIN DESCRIPTION - ORIENTATION
ORIENTATION: LEFT

## 2017-12-06 NOTE — PROGRESS NOTES
This  met with patient to follow up on DC planning. [place] at [facility]. This  collaborated with Velvet Aquino from admissions department. Velvet Aquino reports that there are currently no [place] available. Velvet Aquino reports that there are currently private rooms available in rehab however [place]. This  advised patient who then reports no desire to go to [facility], \"Forget it than. I don't want to go there. I'll just stay here. \"  Patient reports desire to remain at [facility] and be admitted to subacute for skilled therapies after 3 midnights. This  attempted to contact Velvet Aquino via phone and left voice mail that patient no longer desires to go to [facility]. This  advised RN House supervisor that patient reports desire to remain at [facility] for skilled therapies after 3 midnights.   SS to continue to follow

## 2017-12-06 NOTE — PROGRESS NOTES
Hospitalist Progress Note      PCP: Emily Wilks DO    Date of Admission: 12/5/2017    Chief Complaint: L knee pain     Subjective: pt sitting in chair, looks comfortable, has some nausea today AM     Medications:  Reviewed    Infusion Medications    lactated ringers 125 mL/hr at 12/05/17 0815    sodium chloride 50 mL/hr at 12/05/17 1235     Scheduled Medications    gabapentin  100 mg Oral TID    sodium chloride flush  10 mL Intravenous 2 times per day    docusate sodium  100 mg Oral BID    sennosides-docusate sodium  1 tablet Oral Daily    aspirin  81 mg Oral BID     PRN Meds: LORazepam, ketorolac, sodium chloride flush, acetaminophen, oxyCODONE-acetaminophen **OR** oxyCODONE-acetaminophen, morphine **OR** morphine, magnesium hydroxide, bisacodyl, ondansetron      Intake/Output Summary (Last 24 hours) at 12/06/17 0985  Last data filed at 12/06/17 0553   Gross per 24 hour   Intake           3156.5 ml   Output             1475 ml   Net           1681.5 ml       Exam:    BP (!) 115/46   Pulse 58   Temp 98.2 °F (36.8 °C) (Oral)   Resp 16   Ht 5' 4\" (1.626 m)   Wt 162 lb (73.5 kg)   LMP  (LMP Unknown)   SpO2 100%   BMI 27.81 kg/m²     General appearance: No apparent distress, appears stated age and cooperative. HEENT: Pupils equal, round, and reactive to light. Conjunctivae/corneas clear. Neck: Supple, with full range of motion. No jugular venous distention. Trachea midline. Respiratory:  Normal respiratory effort. Clear to auscultation, bilaterally without Rales/Wheezes/Rhonchi. Cardiovascular: Regular rate and rhythm with normal S1/S2 without murmurs, rubs or gallops. Abdomen: Soft, non-tender, non-distended with normal bowel sounds. Musculoskeletal: L knee dressing   Skin: Skin color, texture, turgor normal.  No rashes or lesions. Neurologic:  Neurovascularly intact without any focal sensory/motor deficits.  Cranial nerves: II-XII intact, grossly non-focal.  Psychiatric: Alert and oriented, thought content appropriate, normal insight  Capillary Refill: Brisk,< 3 seconds   Peripheral Pulses: +2 palpable, equal bilaterally       Labs:   Recent Labs      12/06/17   0530   WBC  9.0   HGB  9.7*   HCT  28.8*   PLT  208     Recent Labs      12/06/17   0530   NA  141   K  4.3   CL  104   CO2  26   BUN  14   CREATININE  0.58   CALCIUM  8.8     No results for input(s): AST, ALT, BILIDIR, BILITOT, ALKPHOS in the last 72 hours. No results for input(s): INR in the last 72 hours. No results for input(s): Suzanna Bickers in the last 72 hours. Urinalysis:    Lab Results   Component Value Date    BLOODU neg 11/14/2017    SPECGRAV 1.015 11/14/2017    GLUCOSEU neg 11/14/2017       Radiology:  XR KNEE LEFT (1-2 VIEWS)   Final Result      Stable left knee replacement              Assessment/Plan:    Active Hospital Problems    Diagnosis Date Noted    Osteoarthritis [M19.90]     Anxiety [F41.9]          DVT Prophylaxis: ASA BID  Diet: DIET GENERAL;  Code Status: Full Code    PT/OT Eval Status: done    1.  - POD x1-  PT done  2. Pain- add neurontin/toradol PRN  3. Anxiety- continue with ativan PRN   4. Bradycardia- resolved  5. History of smoking- no dyspnea or SOB   6.  Will follow along with primary service          Electronically signed by She Araujo MD on 12/6/2017 at 9:35 AM

## 2017-12-06 NOTE — PROGRESS NOTES
to increase range, increased pain to a 10/10. Assessment   Body structures, Functions, Activity limitations: Decreased functional mobility ; Decreased ROM; Decreased strength;Decreased balance;Decreased endurance  Assessment: Pt had a high level of pain this afternoon and initially did not want to participate w/therapy. Educated pt on the benefits of therapy and the need to walk as much as she can. Pt tolerated exs in sitting followed by PROM of L knee then gait trials at short distances. Applied CP after session to address edema and pain. Pt states her pain is an 8/10 after therapy. Continue and increase to tolerance.   Treatment Diagnosis: S/P L TKA, difficulty  walking  Specific instructions for Next Treatment: TKA protocol  Prognosis: Good  REQUIRES PT FOLLOW UP: Yes       Discharge Recommendations:  Continue to assess pending progress, Subacute/Skilled Nursing Facility (Recommend 1-2 wk short [address] stay due to pt living alone and bed and bathroom on 2nd floor and laundry in basement)    G-Code     OutComes Score                                                    AM-PAC Score             Goals  Short term goals  Time Frame for Short term goals: 3 days  Short term goal 1: Transfer with SBA  Short term goal 2: Amb 50 feet with ww SBA  Short term goal 3: Improve AROM R knee 8-60 deg knee AROM or greater  Long term goals  Long term goal 1: Determine D/C plan    Plan    Plan  Times per week: 6-7  Times per day: Twice a day (1-2)  Specific instructions for Next Treatment: TKA protocol  Current Treatment Recommendations: Strengthening, ROM, Functional Mobility Training, Transfer Training, Gait Training, Stair training, Manual Therapy - Soft Tissue Mobilization, Neuromuscular Re-education, Home Exercise Program, Safety Education & Training, Patient/Caregiver Education & Training (TKA protocol)     Therapy Time   Individual      Time In 1500         Time Out 1545         Minutes 45  therex 20  gait 10  manual 15 Gigi Islas, JANINE  License and Documentation Cosign  Therapy License Number: [id number]

## 2017-12-06 NOTE — PROGRESS NOTES
Occupational Therapy   Occupational Therapy Initial Assessment  Date: 2017   Patient Name: Tonya Hare  MRN: [id number]     : 1950    Patient Diagnosis(es): There were no encounter diagnoses. has a past medical history of Allergic rhinitis, Dr Sheryl Tate; Anxiety; Back pain; CAD (coronary artery disease); Cat bite involving extremity; Cat bite involving extremity; Cellulitis; Cellulitis, improving; Cervical radiculitis; Cervical radiculitis, surgery pending; CTS (carpal tunnel syndrome), Ortho; Dyslipidemia; Fatigue; Headache(784.0); Hernia, hiatal; Hx of cardiac cath x 2, normal, last ; Osteoarthritis; S/P cervical spinal fusion, 5-6; Seizure disorder ([facility] [address] Sinusitis; Tobacco abuse, quit 9/3/13; and Tobacco abuse, quit 9/3/13. has a past surgical history that includes Appendectomy; Colonoscopy (); Cosmetic surgery (); Tubal ligation (); Cervical disc surgery (); Cardiac catheterization (13); Cholecystectomy; and total knee arthroplasty (Left, 2017).            Restrictions  Restrictions/Precautions  Restrictions/Precautions: Weight Bearing  Required Braces or Orthoses?: Yes  Lower Extremity Weight Bearing Restrictions  Left Lower Extremity Weight Bearing: Weight Bearing As Tolerated  Required Braces or Orthoses  Left Lower Extremity Brace:  (Knee immobilizer on until able to perform SLR I'ly)    Subjective   General  Chart Reviewed: Yes  Patient assessed for rehabilitation services?: Yes  Family / Caregiver Present: No  Referring Practitioner: Dr. Burgess Skelton  Diagnosis: L TKA  Subjective  Subjective: \"I live home alone, so I'm going to have to remember things\"  General Comment  Comments: Pt seated up in bed, eating breakfast upon entry but agreeable to participate in therapy eval. pt pleasant and motivated  Pain Assessment  Patient Currently in Pain: Yes  Pain Assessment: 0-10  Pain Level: 5  Pain Type: Surgical pain  Pain Location: Knee  Pain Orientation: Left  Pain Descriptors: Throbbing  Pain Intervention(s): Medication (see eMar)     Social/Functional History  Social/Functional History  Lives With: Alone  Type of Home: House  Home Layout: Two level, Bed/Bath upstairs  Home Access: Stairs to enter with rails  Entrance Stairs - Number of Steps: 4  Entrance Stairs - Rails: Left  Bathroom Shower/Tub: Tub/Shower unit, Shower chair with back  Bathroom Toilet: Handicap height  Bathroom Equipment: Grab bars in shower  Home Equipment: Rolling walker, 4 wheeled walker, Cane (Pt did not use them but has them from her mother)  ADL Assistance: [address] [place] [address] Avenue: Independent  Homemaking Responsibilities: Yes  Ambulation Assistance: Independent  Active : Yes  Occupation: Retired  Leisure & Hobbies: Goes to [employer]       Objective   Vision: Impaired  Vision Exceptions: Wears glasses for reading  Hearing: Within functional limits    Orientation  Overall Orientation Status: Within Normal Limits        ADL  Feeding: Independent  Grooming: Independent  UE Dressing: Stand by assistance;Setup  LE Dressing: Minimal assistance  Toileting: Contact guard assistance                 Cognition  Overall Cognitive Status: WNL                 LUE AROM (degrees)  LUE AROM : WNL  RUE AROM (degrees)  RUE AROM : WNL  LUE Strength  Gross LUE Strength: Exceptions to [facility]  L Shoulder Flex: 4-/5  RUE Strength  Gross RUE Strength: Exceptions to [facility]  R Shoulder Flex: 4-/5                  Assessment   Performance deficits / Impairments: Decreased functional mobility ; Decreased ADL status; Decreased strength;Decreased endurance;Decreased balance  Prognosis: Good  Decision Making: Medium Complexity  Discharge Recommendations: Continue to assess pending progress;Subacute/Skilled Nursing Facility  REQUIRES OT FOLLOW UP: Yes  OT Equipment Recommendations  Equipment Needed: No  Other: Pt wants to d/c to a SNF prior to d/c home alone        Discharge Recommendations:  Continue to assess pending progress, Subacute/Skilled Nursing Facility     Plan   Plan  Times per week: 3-5  Plan weeks: 1-2  Current Treatment Recommendations: Strengthening, Balance Training, Functional Mobility Training, Safety Education & Training, Neuromuscular Re-education, Self-Care / ADL    G-Code  OT G-codes  Functional Assessment Tool Used: PSFS  Functional Limitation: Self care  Self Care Current Status (): At least 20 percent but less than 40 percent impaired, limited or restricted  Self Care Goal Status (): At least 1 percent but less than 20 percent impaired, limited or restricted  OutComes Score                                           AM-PAC Score             Goals  Short term goals  Time Frame for Short term goals: 1 wk  Short term goal 1: Increase to CGA LB dressing tasks  Short term goal 2: Increase to SBA fxl xfers  Short term goal 3: Increase to 5-6min standing jyoti prior to fatigue to increase safety and I for ADL  Short term goal 4: Increase to CGA for bathing tasks  Long term goals  Time Frame for Long term goals : 1-2 wks  Long term goal 1: Increase to MI dressing tasks  Long term goal 2: Increase to MI fxl xfers  Long term goal 3: Increase to 8-10 min standing jyoti prior to fatigue to increase safety and I for ADL  Long term goal 4: Increase BUE shoulder flexion MMT to 4+/5 to increase safety and I for dressing tasks  Patient Goals   Patient goals : Be safe to return home, may go to a nursing home after hospital stay to further improve safety and I prior to return home alone.        Therapy Time   Individual Concurrent Group Co-treatment   Time In  8:10         Time Out  9:15         Minutes  [address], [place]

## 2017-12-06 NOTE — FLOWSHEET NOTE
Pt encouraged to utilize non pharmaceutical pain relief techniques, pt taught and shown non pharmaceutical measures at this time, no distress noted, ice applied for comfort, pt call light has been placed within reach.

## 2017-12-06 NOTE — PROGRESS NOTES
Physical Therapy  Facility/Department: [facility] MED SURG UNIT  Daily Treatment Note  NAME: Greg Thorne  : 1950  MRN: [id number]    Date of Service: 2017    Patient Diagnosis(es):   Patient Active Problem List    Diagnosis Date Noted    DDD (degenerative disc disease), lumbar 2016    PADMINI (generalized anxiety disorder) 2015    History of tobacco use 2014    S/P cervical spinal fusion, 5-6 2014    CTS (carpal tunnel syndrome), Ortho 2014    Cervical radiculitis, surgery pending 2014    CAD (coronary artery disease) 2014    Cat bite involving extremity 2013    Cellulitis, improving 2013    Sinusitis 10/28/2013    Allergic rhinitis, Dr Carmina Zelaya 10/28/2013    Dyslipidemia 10/28/2013    Hx of cardiac cath x 2, normal, last 9/13 10/07/2013    Fatigue 10/07/2013    FH: CAD (coronary artery disease) 2013    Osteoarthritis     Headache     Hernia, hiatal     Anxiety        Past Medical History:   Diagnosis Date    Allergic rhinitis, Dr Carmina Zelaya 10/28/2013    Anxiety     Back pain     CAD (coronary artery disease)     Cat bite involving extremity 2013    Cat bite involving extremity 2013    Cellulitis 2013    Cellulitis, improving 2013    Cervical radiculitis 2014    Cervical radiculitis, surgery pending 2014    CTS (carpal tunnel syndrome), Ortho 2014    Dyslipidemia 10/28/2013    Fatigue 10/7/2013    Headache(784.0)     H/O MIGRANES    Hernia, hiatal     H/O     Hx of cardiac cath x 2, normal, last 9/13 10/7/2013    Osteoarthritis     S/P cervical spinal fusion, 5-6 2014    Seizure disorder ([facility] [address]     Sinusitis 10/28/2013    Tobacco abuse, quit 9/3/13 10/7/2013    Tobacco abuse, quit 9/3/13      Past Surgical History:   Procedure Laterality Date    APPENDECTOMY      CARDIAC CATHETERIZATION  13    DR. Joycelyn Santos CERVICAL DISC SURGERY      fusion    CHOLECYSTECTOMY      COLONOSCOPY  2009    COSMETIC SURGERY  2010    on eye    [place] TOTAL KNEE ARTHROPLASTY Left 12/5/2017    LEFT TOTAL KNEE ARTHROPLASTY SUPINE BELEN PSI SPINAL (OUTSIDE PAT DR. OLIVA) performed by Allie Dugan MD at [address] [facility]       Restrictions  Restrictions/Precautions  Restrictions/Precautions: Weight Bearing  Required Braces or Orthoses?: Yes  Lower Extremity Weight Bearing Restrictions  Left Lower Extremity Weight Bearing: Weight Bearing As Tolerated  Required Braces or Orthoses  Left Lower Extremity Brace:  (Knee immobilizer to be worn until able to perform SLR without quad lag)  Subjective   General  Chart Reviewed: Yes  Subjective  Subjective: Pt sitting in recliner when I came into see her. Pt states her pain \"is not too bad\".   Pain Screening  Patient Currently in Pain: Yes  Vital Signs  BP Location: Right upper arm  Level of Consciousness: Alert  Patient Currently in Pain: Yes  Oxygen Therapy  O2 Device: None (Room air)       Orientation  Orientation  Overall Orientation Status: Within Normal Limits  Objective      Transfers  Sit to Stand: Contact guard assistance  Stand to sit: Contact guard assistance  Stand Pivot Transfers: Contact guard assistance  Comment: using fww  Ambulation  Ambulation?: Yes  Ambulation 1  Surface: level tile  Device: Rolling Walker  Other Apparatus: Knee Immobilizer  Assistance: Contact guard assistance;Minimal assistance  Quality of Gait: step to pattern w/a min increase in pain  Distance: 50'x1, 70'x1  Stairs/Curb  Stairs?: Yes  Stairs  # Steps : 2  Stairs Height: 6\"  Rails: Bilateral  Device: No Device  Other Apparatus:  (immobilizer)  Assistance: Contact guard assistance  Comment: x3 w/a step to pattern     Balance  Posture: Good  Sitting - Static: Good  Sitting - Dynamic: Good  Standing - Static: Good;-  Standing - Dynamic: Fair;+  Comments: using a fww  Exercises  Straight Leg Raise: 2x10 w/o immobilizer  Quad Sets: seated x20 each  Heelslides: in supine 2x10 w/board  Gluteal Sets: seated x20 each  Knee Long Arc Quad: seated 2x10 each  Ankle Pumps: seated x20 each  Comments: pt able to perform +SLR w/quad lag of 18 deg          Cryotherapy (Minutes\Location): fifteen minutes to L knee  Other: KT tape next visit to L knee  Manual therapy  PROM: L knee to increase range        Assessment   Body structures, Functions, Activity limitations: Decreased functional mobility ; Decreased ROM; Decreased strength;Decreased balance;Decreased endurance  Assessment: Pt tolerated all exs and gait trials w/no increase in pain. Gentle ROM to L knee to improve range followed by supine exs, pt responded well to exs. Pt performed +SLR w/18 deg of quad lag this session along w/stair ambulation using bilat hand rails. Pt states her pain is a 2/10 after therapy. Applied CP after session to address edema and pain. Next visit apply KT tape and measure ROM of L knee.   Treatment Diagnosis: S/P L TKA, difficulty  walking  Specific instructions for Next Treatment: TKA protocol  Prognosis: Good  REQUIRES PT FOLLOW UP: Yes       Discharge Recommendations:  Continue to assess pending progress, Subacute/Skilled Nursing Facility (Recommend 1-2 wk short [address] stay due to pt living alone and bed and bathroom on 2nd floor and laundry in basement)    G-Code     OutComes Score                                                    AM-PAC Score             Goals  Short term goals  Time Frame for Short term goals: 3 days  Short term goal 1: Transfer with SBA  Short term goal 2: Amb 50 feet with ww SBA  Short term goal 3: Improve AROM R knee 8-60 deg knee AROM or greater  Long term goals  Long term goal 1: Determine D/C plan    Plan    Plan  Times per week: 6-7  Times per day: Twice a day (1-2)  Specific instructions for Next Treatment: TKA protocol  Current Treatment Recommendations: Strengthening, ROM, Functional Mobility Training, Transfer Training, Gait Training, Stair training, Manual Therapy - Soft

## 2017-12-07 LAB
ANION GAP SERPL CALCULATED.3IONS-SCNC: 12 MEQ/L (ref 7–13)
BUN BLDV-MCNC: 19 MG/DL (ref 8–23)
CALCIUM SERPL-MCNC: 8.5 MG/DL (ref 8.6–10.2)
CHLORIDE BLD-SCNC: 103 MEQ/L (ref 98–107)
CO2: 26 MEQ/L (ref 22–29)
CREAT SERPL-MCNC: 0.67 MG/DL (ref 0.5–0.9)
GFR AFRICAN AMERICAN: >60
GFR NON-AFRICAN AMERICAN: >60
GLUCOSE BLD-MCNC: 101 MG/DL (ref 74–109)
HCT VFR BLD CALC: 26.5 % (ref 37–47)
HEMOGLOBIN: 8.7 G/DL (ref 12–16)
MCH RBC QN AUTO: 29.1 PG (ref 27–31.3)
MCHC RBC AUTO-ENTMCNC: 32.9 % (ref 33–37)
MCV RBC AUTO: 88.5 FL (ref 82–100)
PDW BLD-RTO: 13.4 % (ref 11.5–14.5)
PLATELET # BLD: 184 K/UL (ref 130–400)
POTASSIUM SERPL-SCNC: 4.3 MEQ/L (ref 3.5–5.1)
RBC # BLD: 2.99 M/UL (ref 4.2–5.4)
SODIUM BLD-SCNC: 141 MEQ/L (ref 132–144)
URINE CULTURE, ROUTINE: NORMAL
WBC # BLD: 7.8 K/UL (ref 4.8–10.8)

## 2017-12-07 PROCEDURE — 97535 SELF CARE MNGMENT TRAINING: CPT

## 2017-12-07 PROCEDURE — 80048 BASIC METABOLIC PNL TOTAL CA: CPT

## 2017-12-07 PROCEDURE — 2580000003 HC RX 258: Performed by: ORTHOPAEDIC SURGERY

## 2017-12-07 PROCEDURE — 6360000002 HC RX W HCPCS: Performed by: INTERNAL MEDICINE

## 2017-12-07 PROCEDURE — 97530 THERAPEUTIC ACTIVITIES: CPT

## 2017-12-07 PROCEDURE — 1210000000 HC MED SURG R&B

## 2017-12-07 PROCEDURE — 36415 COLL VENOUS BLD VENIPUNCTURE: CPT

## 2017-12-07 PROCEDURE — 85027 COMPLETE CBC AUTOMATED: CPT

## 2017-12-07 PROCEDURE — 97116 GAIT TRAINING THERAPY: CPT

## 2017-12-07 PROCEDURE — 6360000002 HC RX W HCPCS: Performed by: ORTHOPAEDIC SURGERY

## 2017-12-07 PROCEDURE — 97110 THERAPEUTIC EXERCISES: CPT

## 2017-12-07 PROCEDURE — 6370000000 HC RX 637 (ALT 250 FOR IP): Performed by: INTERNAL MEDICINE

## 2017-12-07 PROCEDURE — 6370000000 HC RX 637 (ALT 250 FOR IP): Performed by: ORTHOPAEDIC SURGERY

## 2017-12-07 RX ORDER — ASPIRIN 81 MG/1
81 TABLET ORAL 2 TIMES DAILY
Status: CANCELLED | OUTPATIENT
Start: 2017-12-07

## 2017-12-07 RX ORDER — GABAPENTIN 300 MG/1
300 CAPSULE ORAL 3 TIMES DAILY
Status: CANCELLED | OUTPATIENT
Start: 2017-12-07

## 2017-12-07 RX ORDER — BISACODYL 10 MG
10 SUPPOSITORY, RECTAL RECTAL DAILY PRN
Status: CANCELLED | OUTPATIENT
Start: 2017-12-07

## 2017-12-07 RX ORDER — SENNA AND DOCUSATE SODIUM 50; 8.6 MG/1; MG/1
1 TABLET, FILM COATED ORAL DAILY
Status: CANCELLED | OUTPATIENT
Start: 2017-12-08

## 2017-12-07 RX ORDER — HYDROCODONE BITARTRATE AND ACETAMINOPHEN 5; 325 MG/1; MG/1
2 TABLET ORAL EVERY 6 HOURS PRN
Status: CANCELLED | OUTPATIENT
Start: 2017-12-07

## 2017-12-07 RX ORDER — GABAPENTIN 300 MG/1
300 CAPSULE ORAL 3 TIMES DAILY
Status: DISCONTINUED | OUTPATIENT
Start: 2017-12-07 | End: 2017-12-08 | Stop reason: HOSPADM

## 2017-12-07 RX ORDER — DOCUSATE SODIUM 100 MG/1
100 CAPSULE, LIQUID FILLED ORAL 2 TIMES DAILY
Status: CANCELLED | OUTPATIENT
Start: 2017-12-07

## 2017-12-07 RX ORDER — HYDROCODONE BITARTRATE AND ACETAMINOPHEN 5; 325 MG/1; MG/1
1 TABLET ORAL EVERY 6 HOURS PRN
Status: DISCONTINUED | OUTPATIENT
Start: 2017-12-07 | End: 2017-12-08 | Stop reason: HOSPADM

## 2017-12-07 RX ORDER — ACETAMINOPHEN 325 MG/1
650 TABLET ORAL EVERY 4 HOURS PRN
Status: CANCELLED | OUTPATIENT
Start: 2017-12-07

## 2017-12-07 RX ORDER — SODIUM CHLORIDE 0.9 % (FLUSH) 0.9 %
10 SYRINGE (ML) INJECTION EVERY 12 HOURS SCHEDULED
Status: CANCELLED | OUTPATIENT
Start: 2017-12-07

## 2017-12-07 RX ORDER — LORAZEPAM 1 MG/1
1 TABLET ORAL 3 TIMES DAILY PRN
Status: CANCELLED | OUTPATIENT
Start: 2017-12-07

## 2017-12-07 RX ORDER — HYDROCODONE BITARTRATE AND ACETAMINOPHEN 5; 325 MG/1; MG/1
2 TABLET ORAL EVERY 6 HOURS PRN
Status: DISCONTINUED | OUTPATIENT
Start: 2017-12-07 | End: 2017-12-08 | Stop reason: HOSPADM

## 2017-12-07 RX ORDER — SODIUM CHLORIDE 0.9 % (FLUSH) 0.9 %
10 SYRINGE (ML) INJECTION PRN
Status: CANCELLED | OUTPATIENT
Start: 2017-12-07

## 2017-12-07 RX ORDER — HYDROCODONE BITARTRATE AND ACETAMINOPHEN 5; 325 MG/1; MG/1
1 TABLET ORAL EVERY 6 HOURS PRN
Status: CANCELLED | OUTPATIENT
Start: 2017-12-07

## 2017-12-07 RX ORDER — ONDANSETRON 2 MG/ML
4 INJECTION INTRAMUSCULAR; INTRAVENOUS EVERY 6 HOURS PRN
Status: CANCELLED | OUTPATIENT
Start: 2017-12-07

## 2017-12-07 RX ADMIN — Medication 10 ML: at 20:13

## 2017-12-07 RX ADMIN — KETOROLAC TROMETHAMINE 15 MG: 15 INJECTION, SOLUTION INTRAMUSCULAR; INTRAVENOUS at 02:43

## 2017-12-07 RX ADMIN — LORAZEPAM 1 MG: 1 TABLET ORAL at 20:13

## 2017-12-07 RX ADMIN — DOCUSATE SODIUM 100 MG: 100 CAPSULE, LIQUID FILLED ORAL at 09:38

## 2017-12-07 RX ADMIN — KETOROLAC TROMETHAMINE 15 MG: 15 INJECTION, SOLUTION INTRAMUSCULAR; INTRAVENOUS at 09:37

## 2017-12-07 RX ADMIN — GABAPENTIN 300 MG: 300 CAPSULE ORAL at 20:10

## 2017-12-07 RX ADMIN — ASPIRIN 81 MG: 81 TABLET, COATED ORAL at 20:10

## 2017-12-07 RX ADMIN — GABAPENTIN 100 MG: 100 CAPSULE ORAL at 09:38

## 2017-12-07 RX ADMIN — DOCUSATE SODIUM 100 MG: 100 CAPSULE, LIQUID FILLED ORAL at 20:10

## 2017-12-07 RX ADMIN — MORPHINE SULFATE 4 MG: 4 INJECTION INTRAVENOUS at 02:45

## 2017-12-07 RX ADMIN — STANDARDIZED SENNA CONCENTRATE AND DOCUSATE SODIUM 1 TABLET: 8.6; 5 TABLET, FILM COATED ORAL at 09:38

## 2017-12-07 RX ADMIN — KETOROLAC TROMETHAMINE 15 MG: 15 INJECTION, SOLUTION INTRAMUSCULAR; INTRAVENOUS at 15:17

## 2017-12-07 RX ADMIN — Medication 10 ML: at 09:37

## 2017-12-07 RX ADMIN — LORAZEPAM 1 MG: 1 TABLET ORAL at 15:17

## 2017-12-07 RX ADMIN — Medication 10 ML: at 02:44

## 2017-12-07 RX ADMIN — HYDROCODONE BITARTRATE AND ACETAMINOPHEN 2 TABLET: 5; 325 TABLET ORAL at 20:13

## 2017-12-07 RX ADMIN — HYDROCODONE BITARTRATE AND ACETAMINOPHEN 2 TABLET: 5; 325 TABLET ORAL at 10:48

## 2017-12-07 RX ADMIN — ASPIRIN 81 MG: 81 TABLET, COATED ORAL at 09:38

## 2017-12-07 RX ADMIN — GABAPENTIN 300 MG: 300 CAPSULE ORAL at 15:17

## 2017-12-07 RX ADMIN — ONDANSETRON 4 MG: 2 INJECTION INTRAMUSCULAR; INTRAVENOUS at 02:44

## 2017-12-07 ASSESSMENT — PAIN SCALES - GENERAL
PAINLEVEL_OUTOF10: 7
PAINLEVEL_OUTOF10: 9
PAINLEVEL_OUTOF10: 8

## 2017-12-07 ASSESSMENT — PAIN DESCRIPTION - DESCRIPTORS: DESCRIPTORS: THROBBING

## 2017-12-07 ASSESSMENT — PAIN DESCRIPTION - ORIENTATION: ORIENTATION: LEFT

## 2017-12-07 ASSESSMENT — PAIN DESCRIPTION - LOCATION: LOCATION: KNEE

## 2017-12-07 NOTE — PROGRESS NOTES
Physical Therapy  Facility/Department: [facility] MED SURG UNIT  Daily Treatment Note  NAME: Cleotis Leventhal  : 1950  MRN: [id number]    Date of Service: 2017    Patient Diagnosis(es):   Patient Active Problem List    Diagnosis Date Noted    DDD (degenerative disc disease), lumbar 2016    PADMINI (generalized anxiety disorder) 2015    History of tobacco use 2014    S/P cervical spinal fusion, 5-6 2014    CTS (carpal tunnel syndrome), Ortho 2014    Cervical radiculitis, surgery pending 2014    CAD (coronary artery disease) 2014    Cat bite involving extremity 2013    Cellulitis, improving 2013    Sinusitis 10/28/2013    Allergic rhinitis, Dr Sophie Santoro 10/28/2013    Dyslipidemia 10/28/2013    Hx of cardiac cath x 2, normal, last 9/13 10/07/2013    Fatigue 10/07/2013    FH: CAD (coronary artery disease) 2013    Osteoarthritis     Headache     Hernia, hiatal     Anxiety        Past Medical History:   Diagnosis Date    Allergic rhinitis, Dr Sophie Santoro 10/28/2013    Anxiety     Back pain     CAD (coronary artery disease)     Cat bite involving extremity 2013    Cat bite involving extremity 2013    Cellulitis 2013    Cellulitis, improving 2013    Cervical radiculitis 2014    Cervical radiculitis, surgery pending 2014    CTS (carpal tunnel syndrome), Ortho 2014    Dyslipidemia 10/28/2013    Fatigue 10/7/2013    Headache(784.0)     H/O MIGRANES    Hernia, hiatal     H/O     Hx of cardiac cath x 2, normal, last 9/13 10/7/2013    Osteoarthritis     S/P cervical spinal fusion, 5-6 2014    Seizure disorder ([address]     Sinusitis 10/28/2013    Tobacco abuse, quit 9/3/13 10/7/2013    Tobacco abuse, quit 9/3/13      Past Surgical History:   Procedure Laterality Date    APPENDECTOMY      CARDIAC CATHETERIZATION  13    DR. Kwadwo Suarez CERVICAL DISC SURGERY      fusion    CHOLECYSTECTOMY      COLONOSCOPY  2009    COSMETIC SURGERY  2010    on eye    [place] TOTAL KNEE ARTHROPLASTY Left 12/5/2017    LEFT TOTAL KNEE ARTHROPLASTY SUPINE BELEN PSI SPINAL (OUTSIDE PAT DR. OLIVA) performed by Jo Ann Capellan MD at [address] [facility]       Restrictions  Restrictions/Precautions  Restrictions/Precautions: Weight Bearing  Required Braces or Orthoses?: Yes  Lower Extremity Weight Bearing Restrictions  Left Lower Extremity Weight Bearing: Weight Bearing As Tolerated  Required Braces or Orthoses  Left Lower Extremity Brace:  (Knee immobilizer to be worn until able to perform SLR without quad lag)  Subjective   General  Chart Reviewed: Yes  Subjective  Subjective: pt sitting in chair upon arrival and agreeable to therapy  6/10 pain L knee  Pain Screening  Patient Currently in Pain: Yes           Objective      Transfers  Sit to Stand: Contact guard assistance;Stand by assistance  Stand to sit: Contact guard assistance  Ambulation  Ambulation?: Yes  Ambulation 1  Surface: level tile  Device: Rolling Walker  Other Apparatus: Knee Immobilizer  Assistance: Contact guard assistance;Minimal assistance  Quality of Gait: step to pattern, slow pace  Distance: 70'x2       Balance  Posture: Good  Sitting - Static: Good  Sitting - Dynamic: Good  Standing - Static: Good;-  Standing - Dynamic: Fair;+  Exercises  Straight Leg Raise: 2x5 w/o immobilizer  Quad Sets: 20x 3s H  Heelslides: 2x10  Hip Flexion: 2x10  Hip Abduction: 2x10  Knee Long Arc Quad: seated 2x10   Knee Short Arc Quad: 2x10  Ankle Pumps: 20x  Comments: +SLR 12-7-17    L Knee ROM supine -5* to 70*         Cryotherapy (Minutes\Location): fifteen minutes to L knee           Assessment   Body structures, Functions, Activity limitations: Decreased functional mobility ; Decreased ROM; Decreased strength;Decreased balance;Decreased endurance  Assessment: pt tolerated exercises well.  increased ambulation distance and  L knee ROM -5* to 70*  Treatment Diagnosis:

## 2017-12-07 NOTE — PROGRESS NOTES
Occupational Therapy  Facility/Department: [facility] MED SURG UNIT  Daily Treatment Note  NAME: Bettina Hines  : 1950  MRN: [id number]    Date of Service: 2017    Patient Diagnosis(es): There were no encounter diagnoses. has a past medical history of Allergic rhinitis, Dr John Dickson; Anxiety; Back pain; CAD (coronary artery disease); Cat bite involving extremity; Cat bite involving extremity; Cellulitis; Cellulitis, improving; Cervical radiculitis; Cervical radiculitis, surgery pending; CTS (carpal tunnel syndrome), Ortho; Dyslipidemia; Fatigue; Headache(784.0); Hernia, hiatal; Hx of cardiac cath x 2, normal, last ; Osteoarthritis; S/P cervical spinal fusion, 5-6; Seizure disorder ([facility] [address] Sinusitis; Tobacco abuse, quit 9/3/13; and Tobacco abuse, quit 9/3/13. has a past surgical history that includes Appendectomy; Colonoscopy (); Cosmetic surgery (); Tubal ligation (); Cervical disc surgery (); Cardiac catheterization (13); Cholecystectomy; and total knee arthroplasty (Left, 2017). Restrictions  Restrictions/Precautions  Restrictions/Precautions: Weight Bearing (as tolerated LLE)  Required Braces or Orthoses?: Yes  Lower Extremity Weight Bearing Restrictions  Left Lower Extremity Weight Bearing: Weight Bearing As Tolerated  Required Braces or Orthoses  Left Lower Extremity Brace:  (Knee immobilizer to be worn until able to perform SLR without quad lag)  Subjective   General  Chart Reviewed: Yes  Patient assessed for rehabilitation services?: Yes  Family / Caregiver Present: No  Referring Practitioner: Dr. Chen Easley  Diagnosis: L TKA  Subjective  Subjective: \"Okay, Voncille Cowing, we can shower today\"  General Comment  Comments: Pt improving with therapy  Pain Assessment  Patient Currently in Pain: Yes  Pain Location: Knee  Pain Orientation: Left  Pain Descriptors:  Throbbing  Pain Intervention(s): RN notified  Vital Signs  Patient Currently in Pain: Yes   Orientation     Objective    ADL  UE

## 2017-12-07 NOTE — PROGRESS NOTES
DAILY PROGRESS NOTE      POD: 2    Patient doing well  Vitals:    17 0257   BP: (!) 90/56   Pulse: 75   Resp: 16   Temp: 99.3 °F (37.4 °C)   SpO2: 96%      Temp (24hrs), Av.2 °F (36.8 °C), Min:97.3 °F (36.3 °C), Max:99.3 °F (37.4 °C)       Pain Control Good  No chest pain or shortness of breath. No calf pain    Exam:   Dressing clean, dry, and intact  Operative extremity shows neuro vascular status intact. Flexion and extension intact on operative extremity  Calves soft and non-tender without evidence of DVT. .      Labs reviewed:  CBC:   Recent Labs      17   0530  17   0521   WBC  9.0  7.8   HGB  9.7*  8.7*   PLT  208  184     BMP:    Recent Labs      17   0530  17   0521   NA  141  141   K  4.3  4.3   CL  104  103   CO2  26  26   BUN  14  19   CREATININE  0.58  0.67   GLUCOSE  127*  101       I&O  I/O last 3 completed shifts: In: 65 [P.O.:820; I.V.:15]  Out: -       Assessment:  Good Post Operative Course.     Plan:  D/c to rehab tomorrow    Rivka Douglas MD  2017 8:00 AM

## 2017-12-07 NOTE — PROGRESS NOTES
Hospitalist Progress Note      PCP: Zachery Chavez DO    Date of Admission: 12/5/2017    Chief Complaint: L knee pain     Subjective: pt in chair, has knee pain, in mild distress due this pain     Medications:  Reviewed    Infusion Medications    Scheduled Medications    gabapentin  300 mg Oral TID    magnesium hydroxide  30 mL Oral Once    sodium chloride flush  10 mL Intravenous 2 times per day    docusate sodium  100 mg Oral BID    sennosides-docusate sodium  1 tablet Oral Daily    aspirin  81 mg Oral BID     PRN Meds: HYDROcodone 5 mg - acetaminophen, HYDROcodone 5 mg - acetaminophen, LORazepam, ketorolac, sodium chloride flush, acetaminophen, magnesium hydroxide, bisacodyl, ondansetron      Intake/Output Summary (Last 24 hours) at 12/07/17 0927  Last data filed at 12/07/17 0916   Gross per 24 hour   Intake             1015 ml   Output                0 ml   Net             1015 ml       Exam:    BP (!) 90/56   Pulse 75   Temp 99.3 °F (37.4 °C) (Oral)   Resp 16   Ht 5' 4\" (1.626 m)   Wt 162 lb (73.5 kg)   LMP  (LMP Unknown)   SpO2 96%   BMI 27.81 kg/m²     General appearance: No apparent distress, appears stated age and cooperative. HEENT: Pupils equal, round, and reactive to light. Conjunctivae/corneas clear. Neck: Supple, with full range of motion. No jugular venous distention. Trachea midline. Respiratory:  Normal respiratory effort. Clear to auscultation, bilaterally without Rales/Wheezes/Rhonchi. Cardiovascular: Regular rate and rhythm with normal S1/S2 without murmurs, rubs or gallops. Abdomen: Soft, non-tender, non-distended with normal bowel sounds. Musculoskeletal: L knee dressing  Skin: Skin color, texture, turgor normal.  No rashes or lesions. Neurologic:  Neurovascularly intact without any focal sensory/motor deficits.  Cranial nerves: II-XII intact, grossly non-focal.  Psychiatric: Alert and oriented, thought content appropriate, normal insight  Capillary Refill: Brisk,< 3

## 2017-12-07 NOTE — FLOWSHEET NOTE
Pt frequently encouraged through out the day to take MOM, pt states she was having PT in am and wanted to wait until after, re approached and pt states she is having visitorors and would again like to wait until a later time, will pass on to on coming nurse.

## 2017-12-07 NOTE — PROGRESS NOTES
Physical Therapy  Facility/Department: [facility] MED SURG UNIT  Daily Treatment Note  NAME: Luh Fall  : 1950  MRN: [id number]    Date of Service: 2017    Patient Diagnosis(es):   Patient Active Problem List    Diagnosis Date Noted    DDD (degenerative disc disease), lumbar 2016    PADMINI (generalized anxiety disorder) 2015    History of tobacco use 2014    S/P cervical spinal fusion, 5-6 2014    CTS (carpal tunnel syndrome), Ortho 2014    Cervical radiculitis, surgery pending 2014    CAD (coronary artery disease) 2014    Cat bite involving extremity 2013    Cellulitis, improving 2013    Sinusitis 10/28/2013    Allergic rhinitis, Dr Gary Odom 10/28/2013    Dyslipidemia 10/28/2013    Hx of cardiac cath x 2, normal, last 9/13 10/07/2013    Fatigue 10/07/2013    FH: CAD (coronary artery disease) 2013    Osteoarthritis     Headache     Hernia, hiatal     Anxiety        Past Medical History:   Diagnosis Date    Allergic rhinitis, Dr Gary Odom 10/28/2013    Anxiety     Back pain     CAD (coronary artery disease)     Cat bite involving extremity 2013    Cat bite involving extremity 2013    Cellulitis 2013    Cellulitis, improving 2013    Cervical radiculitis 2014    Cervical radiculitis, surgery pending 2014    CTS (carpal tunnel syndrome), Ortho 2014    Dyslipidemia 10/28/2013    Fatigue 10/7/2013    Headache(784.0)     H/O MIGRANES    Hernia, hiatal     H/O     Hx of cardiac cath x 2, normal, last 9/13 10/7/2013    Osteoarthritis     S/P cervical spinal fusion, 5-6 2014    Seizure disorder ([address]     Sinusitis 10/28/2013    Tobacco abuse, quit 9/3/13 10/7/2013    Tobacco abuse, quit 9/3/13      Past Surgical History:   Procedure Laterality Date    APPENDECTOMY      CARDIAC CATHETERIZATION  13    DR. Suha Campbell CERVICAL DISC SURGERY      fusion    CHOLECYSTECTOMY      COLONOSCOPY  2009    COSMETIC SURGERY  2010    on eye    [place] TOTAL KNEE ARTHROPLASTY Left 12/5/2017    LEFT TOTAL KNEE ARTHROPLASTY SUPINE BELEN PSI SPINAL (OUTSIDE PAT DR. OLIVA) performed by Ryan Jenkins MD at [address] [facility]       Restrictions  Restrictions/Precautions  Restrictions/Precautions: Weight Bearing  Required Braces or Orthoses?: Yes  Lower Extremity Weight Bearing Restrictions  Left Lower Extremity Weight Bearing: Weight Bearing As Tolerated  Required Braces or Orthoses  Left Lower Extremity Brace:  (Knee immobilizer to be worn until able to perform SLR without quad lag  +SLR 12-7-17  Subjective   Pt sitting up in chair upon arrival and agreeable to therapy  Pain 6/10 R knee         Orientation  Orientation  Overall Orientation Status: Within Normal Limits  Objective      Transfers  Sit to Stand: Stand by assistance  Stand to sit: Stand by assistance  Ambulation  Ambulation?: Yes  Ambulation 1  Surface: level tile  Device: Rolling Walker  Assistance: Stand by assistance  Quality of Gait: step to pattern, slow pace  Distance: 70'x2  Comments: +SLR  Stairs/Curb  Stairs?: Yes  Stairs  # Steps : 2  Stairs Height: 6\"  Rails: Bilateral  Device: No Device  Assistance: Contact guard assistance  Comment: marked time vcs for tech     Balance  Posture: Good  Sitting - Static: Good  Sitting - Dynamic: Good  Standing - Static: Good;-  Standing - Dynamic: Fair;+  Exercises  Straight Leg Raise: 2x5   Quad Sets: 20x 3s H  Heelslides: 2x10  Hip Flexion: 2x10  Hip Abduction: 2x10  Knee Long Arc Quad: seated 2x10   Knee Short Arc Quad: 2x10  Ankle Pumps: 20x  Comments: +SLR 12-7-17        Cryotherapy (Minutes\Location): fifteen minutes to L knee           Assessment   Body structures, Functions, Activity limitations: Decreased functional mobility ; Decreased ROM; Decreased strength;Decreased balance;Decreased endurance  Assessment: pt able to do SLR and tolerated exercises well.  increased ambulation distance and  L knee ROM 5* -70*  Treatment Diagnosis: S/P L TKA, difficulty  walking  Specific instructions for Next Treatment: TKA protocol  Prognosis: Good  REQUIRES PT FOLLOW UP: Yes       Discharge Recommendations:  Continue to assess pending progress, Subacute/Skilled Nursing Facility (Recommend 1-2 wk short [address] stay due to pt living alone and bed and bathroom on 2nd floor and laundry in basement)    G-Code     OutComes Score                                                    AM-PAC Score             Goals  Short term goals  Time Frame for Short term goals: 3 days  Short term goal 1: Transfer with SBA  Short term goal 2: Amb 50 feet with ww SBA  Short term goal 3: Improve AROM R knee 8-60 deg knee AROM or greater  Long term goals  Long term goal 1: Determine D/C plan    Plan    Plan  Times per week: 6-7  Times per day: Twice a day (1-2)  Specific instructions for Next Treatment: TKA protocol  Current Treatment Recommendations: Strengthening, ROM, Functional Mobility Training, Transfer Training, Gait Training, Stair training, Manual Therapy - Soft Tissue Mobilization, Neuromuscular Re-education, Home Exercise Program, Safety Education & Training, Patient/Caregiver Education & Training (TKA protocol)     Therapy Time   Individual Concurrent Group Co-treatment   Time In  200         Time Out  300         Minutes  60                 Mayra Giles PTA  License and Documentation Cosign  Therapy License Number: [id number]

## 2017-12-08 VITALS
TEMPERATURE: 100 F | RESPIRATION RATE: 16 BRPM | OXYGEN SATURATION: 97 % | WEIGHT: 162 LBS | HEART RATE: 74 BPM | HEIGHT: 64 IN | SYSTOLIC BLOOD PRESSURE: 100 MMHG | BODY MASS INDEX: 27.66 KG/M2 | DIASTOLIC BLOOD PRESSURE: 42 MMHG

## 2017-12-08 LAB
HCT VFR BLD CALC: 24.1 % (ref 37–47)
HEMOGLOBIN: 8 G/DL (ref 12–16)
MCH RBC QN AUTO: 29.2 PG (ref 27–31.3)
MCHC RBC AUTO-ENTMCNC: 33.2 % (ref 33–37)
MCV RBC AUTO: 88 FL (ref 82–100)
PDW BLD-RTO: 13.3 % (ref 11.5–14.5)
PLATELET # BLD: 131 K/UL (ref 130–400)
RBC # BLD: 2.74 M/UL (ref 4.2–5.4)
WBC # BLD: 9.3 K/UL (ref 4.8–10.8)

## 2017-12-08 PROCEDURE — 6370000000 HC RX 637 (ALT 250 FOR IP): Performed by: INTERNAL MEDICINE

## 2017-12-08 PROCEDURE — 36415 COLL VENOUS BLD VENIPUNCTURE: CPT

## 2017-12-08 PROCEDURE — 6370000000 HC RX 637 (ALT 250 FOR IP): Performed by: ORTHOPAEDIC SURGERY

## 2017-12-08 PROCEDURE — 97110 THERAPEUTIC EXERCISES: CPT

## 2017-12-08 PROCEDURE — 85027 COMPLETE CBC AUTOMATED: CPT

## 2017-12-08 PROCEDURE — 2580000003 HC RX 258: Performed by: ORTHOPAEDIC SURGERY

## 2017-12-08 PROCEDURE — 97530 THERAPEUTIC ACTIVITIES: CPT

## 2017-12-08 PROCEDURE — 97116 GAIT TRAINING THERAPY: CPT

## 2017-12-08 PROCEDURE — 6360000002 HC RX W HCPCS: Performed by: INTERNAL MEDICINE

## 2017-12-08 PROCEDURE — 97535 SELF CARE MNGMENT TRAINING: CPT

## 2017-12-08 RX ORDER — BUPIVACAINE HYDROCHLORIDE 5 MG/ML
INJECTION, SOLUTION EPIDURAL; INTRACAUDAL PRN
Status: DISCONTINUED | OUTPATIENT
Start: 2017-12-05 | End: 2017-12-08 | Stop reason: SDUPTHER

## 2017-12-08 RX ORDER — GABAPENTIN 300 MG/1
300 CAPSULE ORAL 3 TIMES DAILY
Qty: 90 CAPSULE | Refills: 0 | Status: SHIPPED | OUTPATIENT
Start: 2017-12-08 | End: 2018-03-22

## 2017-12-08 RX ORDER — ASPIRIN 81 MG/1
81 TABLET ORAL 2 TIMES DAILY
Qty: 60 TABLET | Refills: 0 | Status: SHIPPED | OUTPATIENT
Start: 2017-12-08

## 2017-12-08 RX ORDER — POLYETHYLENE GLYCOL 3350 17 G/17G
17 POWDER, FOR SOLUTION ORAL DAILY
Status: DISCONTINUED | OUTPATIENT
Start: 2017-12-08 | End: 2017-12-08 | Stop reason: HOSPADM

## 2017-12-08 RX ADMIN — POLYETHYLENE GLYCOL 3350 17 G: 17 POWDER, FOR SOLUTION ORAL at 09:04

## 2017-12-08 RX ADMIN — Medication 10 ML: at 09:03

## 2017-12-08 RX ADMIN — DOCUSATE SODIUM 100 MG: 100 CAPSULE, LIQUID FILLED ORAL at 08:52

## 2017-12-08 RX ADMIN — HYDROCODONE BITARTRATE AND ACETAMINOPHEN 2 TABLET: 5; 325 TABLET ORAL at 03:14

## 2017-12-08 RX ADMIN — HYDROCODONE BITARTRATE AND ACETAMINOPHEN 2 TABLET: 5; 325 TABLET ORAL at 13:35

## 2017-12-08 RX ADMIN — HYDROCODONE BITARTRATE AND ACETAMINOPHEN 2 TABLET: 5; 325 TABLET ORAL at 08:54

## 2017-12-08 RX ADMIN — GABAPENTIN 300 MG: 300 CAPSULE ORAL at 08:52

## 2017-12-08 RX ADMIN — ASPIRIN 81 MG: 81 TABLET, COATED ORAL at 08:52

## 2017-12-08 RX ADMIN — STANDARDIZED SENNA CONCENTRATE AND DOCUSATE SODIUM 1 TABLET: 8.6; 5 TABLET, FILM COATED ORAL at 08:52

## 2017-12-08 RX ADMIN — GABAPENTIN 300 MG: 300 CAPSULE ORAL at 13:35

## 2017-12-08 RX ADMIN — KETOROLAC TROMETHAMINE 15 MG: 15 INJECTION, SOLUTION INTRAMUSCULAR; INTRAVENOUS at 01:18

## 2017-12-08 ASSESSMENT — PAIN SCALES - GENERAL
PAINLEVEL_OUTOF10: 0
PAINLEVEL_OUTOF10: 4
PAINLEVEL_OUTOF10: 6
PAINLEVEL_OUTOF10: 7

## 2017-12-08 ASSESSMENT — PAIN DESCRIPTION - FREQUENCY: FREQUENCY: CONTINUOUS

## 2017-12-08 ASSESSMENT — PAIN DESCRIPTION - ORIENTATION: ORIENTATION: LEFT

## 2017-12-08 ASSESSMENT — PAIN DESCRIPTION - DESCRIPTORS: DESCRIPTORS: DISCOMFORT;SORE

## 2017-12-08 ASSESSMENT — PAIN DESCRIPTION - ONSET: ONSET: SUDDEN

## 2017-12-08 ASSESSMENT — PAIN DESCRIPTION - LOCATION: LOCATION: KNEE

## 2017-12-08 ASSESSMENT — PAIN DESCRIPTION - PAIN TYPE: TYPE: SURGICAL PAIN

## 2017-12-08 ASSESSMENT — PAIN DESCRIPTION - PROGRESSION: CLINICAL_PROGRESSION: GRADUALLY IMPROVING

## 2017-12-08 NOTE — PROGRESS NOTES
Patient assessment completed. Left knee surgical dressing free of drainage. Patient left knee swollen and pain full to touch. Patient Pedal pulses 2+ bilaterally. Nursing Adult Assessment    General Appearance  [x] Facial Expressions, extremities, & body posture are relaxed. [] Exceptions:    Cognitive  [x] Alert, make eye contact when prompted. [x] Oriented to person, place, & situation. [] Exceptions:    Respiratory  [x] Unlabored breathing   [x] Speaks in clear and complete sentences   [x] Chest expansion is symmetrical with breaths   [x] Breath sounds are clear bilaterally. [] Exceptions:    Cardiovascular  [x] Regular apical heart sounds   [x] Peripheral pulses are palpable   [x] Capillary refill < 3 seconds in all extremities. [] Exceptions:    Abdomen  [x] Non-tender   [x] Non-distended   [x] Bowel sounds x4 quadrants.  [] Exceptions: Last BM on 12/04/17  Skin  [x] Color appropriate for ethnicity   [] No rash or discoloration present at the area(s) of complaint   [x] Warm and dry to touch. [] Exceptions: Aqucell dressing to Left  Knee surgical site. Extremities  [] Non-tender   [] Normal range of motion   [] Normal sensation   [] Normal Appearance, No Edema.   [x] Exceptions: Left lower extremity

## 2017-12-08 NOTE — PROGRESS NOTES
short [address] stay due to pt living alone and bed and bathroom on 2nd floor and laundry in basement)    G-Code     Goals  Short term goals  Time Frame for Short term goals: 3 days  Short term goal 1: Transfer with SBA GOAL MET  Short term goal 2: Amb 50 feet with ww SBA GOAL MET  Short term goal 3: Improve AROM R knee 8-60 deg knee AROM or greater GOAL MET  Long term goals  Long term goal 1: Determine D/C plan    Plan    Plan  Times per week: 6-7  Times per day: Twice a day (1-2)  Specific instructions for Next Treatment: TKA protocol  Current Treatment Recommendations: Strengthening, ROM, Functional Mobility Training, Transfer Training, Gait Training, Stair training, Manual Therapy - Soft Tissue Mobilization, Neuromuscular Re-education, Home Exercise Program, Safety Education & Training, Patient/Caregiver Education & Training (TKA protocol)     Therapy Time   Individual Concurrent Group Co-treatment   Time In  100         Time Out  200         Minutes  Laura 27, PTA  License and [address] Number: [id number]

## 2017-12-08 NOTE — ADDENDUM NOTE
Addendum  created 12/08/17 3592 by Kael Sifuentes CRNA    Anesthesia Intra Meds edited, Orders acknowledged in Narrator

## 2017-12-08 NOTE — PROGRESS NOTES
Supervision  Stand to sit: Supervision                                            Type of ROM/Therapeutic Exercise  Type of ROM/Therapeutic Exercise:  (1 lb. Tbar in all planes and directions 20x)  Comment:  (Trained and educated pt. in HEP and gave pt. handout for HEP)  Exercises  Grasp/Release:  (hand and arm squeezes wth BUE with therapy balls 20x)  To increase and maintain UB strength  Pt. Is d/c'ing today                    Assessment      Discharge Recommendations: Continue to assess pending progress;Subacute/Skilled Nursing Facility  REQUIRES OT FOLLOW UP: Yes       Discharge Recommendations:  Home to out pt. Plan   Plan  Times per week: 3-6  Plan weeks: 1-2  Current Treatment Recommendations: Strengthening, Balance Training, Functional Mobility Training, Safety Education & Training, Neuromuscular Re-education, Self-Care / ADL  G-Code     OutComes Score                                           AM-PAC Score             Goals  Short term goals  Time Frame for Short term goals: 1 wk  Short term goal 1: Increase to CGA LB dressing tasks  Short term goal 2: Increase to SBA fxl xfers  Short term goal 3: Increase to 5-6min standing jyoti prior to fatigue to increase safety and I for ADL  Short term goal 4: Increase to CGA for bathing tasks  Long term goals  Time Frame for Long term goals : 1-2 wks  Long term goal 1: Increase to MI dressing tasks  Long term goal 2: Increase to MI fxl xfers  Long term goal 3: Increase to 8-10 min standing jyoti prior to fatigue to increase safety and I for ADL  Long term goal 4: Increase BUE shoulder flexion MMT to 4+/5 to increase safety and I for dressing tasks  Patient Goals   Patient goals : Be safe to return home, may go to a nursing home after hospital stay to further improve safety and I prior to return home alone.        Therapy Time   Individual Concurrent Group Co-treatment   Time In  10:50am         Time Out  11:50am         Minutes  [address] [facility], [place]AN[place]A  License and [address] Number: [id number]

## 2017-12-08 NOTE — PROGRESS NOTES
Physical Therapy  Facility/Department: [facility] MED SURG UNIT  Daily Treatment Note  NAME: Phill Stevens  : 1950  MRN: [id number]    Date of Service: 2017    Patient Diagnosis(es):   Patient Active Problem List    Diagnosis Date Noted    DDD (degenerative disc disease), lumbar 2016    PADMINI (generalized anxiety disorder) 2015    History of tobacco use 2014    S/P cervical spinal fusion, 5-6 2014    CTS (carpal tunnel syndrome), Ortho 2014    Cervical radiculitis, surgery pending 2014    CAD (coronary artery disease) 2014    Cat bite involving extremity 2013    Cellulitis, improving 2013    Sinusitis 10/28/2013    Allergic rhinitis, Dr Alehsa Quintanilla 10/28/2013    Dyslipidemia 10/28/2013    Hx of cardiac cath x 2, normal, last 9/13 10/07/2013    Fatigue 10/07/2013    FH: CAD (coronary artery disease) 2013    Osteoarthritis     Headache     Hernia, hiatal     Anxiety        Past Medical History:   Diagnosis Date    Allergic rhinitis, Dr Alesha Quintanilla 10/28/2013    Anxiety     Back pain     CAD (coronary artery disease)     Cat bite involving extremity 2013    Cat bite involving extremity 2013    Cellulitis 2013    Cellulitis, improving 2013    Cervical radiculitis 2014    Cervical radiculitis, surgery pending 2014    CTS (carpal tunnel syndrome), Ortho 2014    Dyslipidemia 10/28/2013    Fatigue 10/7/2013    Headache(784.0)     H/O MIGRANES    Hernia, hiatal     H/O     Hx of cardiac cath x 2, normal, last 9/13 10/7/2013    Osteoarthritis     S/P cervical spinal fusion, 5-6 2014    Seizure disorder ([facility] [address]     Sinusitis 10/28/2013    Tobacco abuse, quit 9/3/13 10/7/2013    Tobacco abuse, quit 9/3/13      Past Surgical History:   Procedure Laterality Date    APPENDECTOMY      CARDIAC CATHETERIZATION  13    DR. Janeth Beckman CERVICAL DISC SURGERY      fusion    CHOLECYSTECTOMY     

## 2017-12-08 NOTE — PROGRESS NOTES
insight  Capillary Refill: Brisk,< 3 seconds   Peripheral Pulses: +2 palpable, equal bilaterally       Labs:   Recent Labs      12/06/17   0530  12/07/17   0521  12/08/17   0512   WBC  9.0  7.8  9.3   HGB  9.7*  8.7*  8.0*   HCT  28.8*  26.5*  24.1*   PLT  208  184  131     Recent Labs      12/06/17   0530  12/07/17   0521   NA  141  141   K  4.3  4.3   CL  104  103   CO2  26  26   BUN  14  19   CREATININE  0.58  0.67   CALCIUM  8.8  8.5*     No results for input(s): AST, ALT, BILIDIR, BILITOT, ALKPHOS in the last 72 hours. No results for input(s): INR in the last 72 hours. No results for input(s): Lesa Marquez in the last 72 hours. Urinalysis:    Lab Results   Component Value Date    BLOODU neg 11/14/2017    SPECGRAV 1.015 11/14/2017    GLUCOSEU neg 11/14/2017       Radiology:  XR KNEE LEFT (1-2 VIEWS)   Final Result      Stable left knee replacement              Assessment/Plan:    Active Hospital Problems    Diagnosis Date Noted    Osteoarthritis [M19.90]     Anxiety [F41.9]          DVT Prophylaxis: ASA BID  Diet: DIET GENERAL;  Code Status: Full Code    PT/OT Eval Status: done    1. POD x3-  PT done  2. Pain- better controlled today, no change in meds   3. Constipation- stool softener,  follow up clinically    4. Anxiety- continue with ativan PRN   5. Bradycardia- resolved  6. History of smoking- no dyspnea or SOB   7.  Stable for DC from medical stand point   Will follow along with primary service       Electronically signed by Davion Ware MD on 12/8/2017 at 9:07 AM

## 2017-12-09 ENCOUNTER — CARE COORDINATION (OUTPATIENT)
Dept: CASE MANAGEMENT | Age: 67
End: 2017-12-09

## 2017-12-09 NOTE — CARE COORDINATION
[facility] Transitions Initial Follow Up Call    Call within 2 business days of discharge: Yes    Patient: Shree Marcelino Patient : 1950   MRN: <[id number]>  Reason for Admission:  Left total knee  Discharge Date: 17 RARS: [facility] Risk Score: 12.5     Spoke with: Jose Alberto Howard 476: [facility]    Non-face-to-face services provided:  Obtained and reviewed discharge summary and/or continuity of care documents     Patient states she is doing awful when called. She states she has swelling to her left knee, calf and foot which is worse today. Patient is very upset that she was discharged home with outpatient therapy stating she was told she was going to be going to the acute rehab unit. Per PT notes, patient was walking 140 ft with standby assist.  Notes from  show that they discussed discharge plan the day of discharge and patient was agreeable to going home with OP therapy. PT notes indicate that patient would benefit from SNF or acute rehab d/t the fact that she lives alone and bed and bath are on the 2nd floor. Patient states she is upset that even home care was not offered to her and was tearful on the phone. TC placed to [facility], unable to reach anyone in acute rehab. Spoke with nursing supervisor Patti Saleh who states that there is no social work or  on the weekend. She did however offer to call CM at home to see if anything can be done at this point. Patti Saleh called back stating she spoke with  that saw patient. She states patient was offered home care which she adamantly denied. She also states she spoke with Dr. Miky Spicer who had said he told patient to stay a couple more days but she declined. TC placed to patient and relayed what nursing supervisor had told writer. Patient states she did NOT refuse home care and that Dr. Miky Spicer did NOT tell her to stay longer. She states she will be calling to speak to the \"hospital head\" on Monday morning. [facility] regarding concerns voiced by patient. Inpatient Assessment  Care Transitions 24 Hour Call    Do you have any ongoing symptoms?:  Yes  Patient-reported symptoms:  Other (Comment: swelling to left leg, knee, calf and foot)  Do you have a copy of your discharge instructions?:  Yes  Do you have all of your prescriptions and are they filled?:  Yes  Have you been contacted by a [facility] Pharmacist?:  No  Have you scheduled your follow up appointment?:  Yes  How are you going to get to your appointment?:  Car - family or friend to transport  Were you discharged with any Home Care or Post Acute Services:  No  Do you feel like you have everything you need to keep you well at home?:  Yes  Care Transitions Interventions         Follow Up  No future appointments.     Bettye Fong RN

## 2017-12-12 NOTE — DISCHARGE SUMMARY
Discharge summary  This patient Greg Comes was admitted to the hospital on 12/5/2017  after undergoing Procedure(s) (LRB):  LEFT TOTAL KNEE ARTHROPLASTY SUPINE BELEN PSI SPINAL (OUTSIDE PAT DR. OLIVA) (Left) without complications that morning. During the postoperative period,while in hospital, patient was medically managed by the hospitalist. Please see medial notes and H&P for patients additional diagnoses. Ortho agrees with all medical diagnoses and treatments while patient in hospital.    Hospital course  Patient tolerated surgical procedure well and there was no complications. Patient progressed adequtly through their recovery during hospital stay including PT and rehabilitation. DVT prophylaxis was implemented POD#1  Patient was then D/C on 12/8/2017 to Home  in stable condition. Patient was instructed on the use of pain medications, the signs and symptoms of infection, and was given our number to call should they have any questions or concerns following discharge.

## 2018-01-10 ENCOUNTER — HOSPITAL ENCOUNTER (OUTPATIENT)
Dept: ULTRASOUND IMAGING | Age: 68
Discharge: HOME | End: 2018-01-10
Payer: MEDICARE

## 2018-01-10 DIAGNOSIS — M79.89 SWELLING OF LIMB: ICD-10-CM

## 2018-01-10 PROCEDURE — 93971 EXTREMITY STUDY: CPT

## 2018-02-20 DIAGNOSIS — F41.1 GAD (GENERALIZED ANXIETY DISORDER): ICD-10-CM

## 2018-02-20 RX ORDER — LORAZEPAM 1 MG/1
TABLET ORAL
Qty: 90 TABLET | Refills: 1 | Status: SHIPPED | OUTPATIENT
Start: 2018-02-20 | End: 2018-03-22 | Stop reason: SDUPTHER

## 2018-02-20 NOTE — TELEPHONE ENCOUNTER
Patient was last seen on 11-14-17  See OARRS/CSM monitoring for last fill date.    Patient states she is unable to come in due to a recent knee replacement    Medication is pending  Please approve or deny this request

## 2018-03-22 ENCOUNTER — OFFICE VISIT (OUTPATIENT)
Dept: PRIMARY CARE CLINIC | Age: 68
End: 2018-03-22
Payer: MEDICARE

## 2018-03-22 VITALS
OXYGEN SATURATION: 99 % | BODY MASS INDEX: 28.17 KG/M2 | DIASTOLIC BLOOD PRESSURE: 76 MMHG | SYSTOLIC BLOOD PRESSURE: 130 MMHG | HEIGHT: 64 IN | HEART RATE: 58 BPM | WEIGHT: 165 LBS | RESPIRATION RATE: 16 BRPM | TEMPERATURE: 97.2 F

## 2018-03-22 DIAGNOSIS — R60.9 PERIPHERAL EDEMA: ICD-10-CM

## 2018-03-22 DIAGNOSIS — D51.9 ANEMIA DUE TO VITAMIN B12 DEFICIENCY, UNSPECIFIED B12 DEFICIENCY TYPE: ICD-10-CM

## 2018-03-22 DIAGNOSIS — F41.1 GAD (GENERALIZED ANXIETY DISORDER): ICD-10-CM

## 2018-03-22 DIAGNOSIS — E87.8 ELECTROLYTE IMBALANCE: ICD-10-CM

## 2018-03-22 DIAGNOSIS — E55.9 VITAMIN D DEFICIENCY: ICD-10-CM

## 2018-03-22 DIAGNOSIS — R30.0 DYSURIA: Primary | ICD-10-CM

## 2018-03-22 DIAGNOSIS — Z96.652 STATUS POST TOTAL LEFT KNEE REPLACEMENT: ICD-10-CM

## 2018-03-22 LAB
ALBUMIN SERPL-MCNC: 4.5 G/DL (ref 3.9–4.9)
ALP BLD-CCNC: 82 U/L (ref 40–130)
ALT SERPL-CCNC: 16 U/L (ref 0–33)
ANION GAP SERPL CALCULATED.3IONS-SCNC: 17 MEQ/L (ref 7–13)
APPEARANCE FLUID: ABNORMAL
AST SERPL-CCNC: 20 U/L (ref 0–35)
BASOPHILS ABSOLUTE: 0 K/UL (ref 0–0.2)
BASOPHILS RELATIVE PERCENT: 0.6 %
BILIRUB SERPL-MCNC: 0.4 MG/DL (ref 0–1.2)
BILIRUBIN, POC: ABNORMAL
BLOOD URINE, POC: ABNORMAL
BUN BLDV-MCNC: 13 MG/DL (ref 8–23)
CALCIUM SERPL-MCNC: 9.4 MG/DL (ref 8.6–10.2)
CHLORIDE BLD-SCNC: 101 MEQ/L (ref 98–107)
CLARITY, POC: ABNORMAL
CO2: 25 MEQ/L (ref 22–29)
COLOR, POC: ABNORMAL
CREAT SERPL-MCNC: 0.45 MG/DL (ref 0.5–0.9)
EOSINOPHILS ABSOLUTE: 0.1 K/UL (ref 0–0.7)
EOSINOPHILS RELATIVE PERCENT: 1.8 %
FOLATE: 15.5 NG/ML (ref 7.3–26.1)
GFR AFRICAN AMERICAN: >60
GFR NON-AFRICAN AMERICAN: >60
GLOBULIN: 2.5 G/DL (ref 2.3–3.5)
GLUCOSE BLD-MCNC: 84 MG/DL (ref 74–109)
GLUCOSE URINE, POC: ABNORMAL
HCT VFR BLD CALC: 37.6 % (ref 37–47)
HEMOGLOBIN: 12.4 G/DL (ref 12–16)
IRON SATURATION: 18 % (ref 11–46)
IRON: 52 UG/DL (ref 37–145)
KETONES, POC: ABNORMAL
LEUKOCYTE EST, POC: ABNORMAL
LYMPHOCYTES ABSOLUTE: 3.1 K/UL (ref 1–4.8)
LYMPHOCYTES RELATIVE PERCENT: 41.2 %
MCH RBC QN AUTO: 28.9 PG (ref 27–31.3)
MCHC RBC AUTO-ENTMCNC: 33 % (ref 33–37)
MCV RBC AUTO: 87.5 FL (ref 82–100)
MONOCYTES ABSOLUTE: 0.5 K/UL (ref 0.2–0.8)
MONOCYTES RELATIVE PERCENT: 6.9 %
NEUTROPHILS ABSOLUTE: 3.7 K/UL (ref 1.4–6.5)
NEUTROPHILS RELATIVE PERCENT: 49.5 %
NITRITE, POC: ABNORMAL
PDW BLD-RTO: 14.2 % (ref 11.5–14.5)
PH, POC: 6
PLATELET # BLD: 280 K/UL (ref 130–400)
POTASSIUM SERPL-SCNC: 4.3 MEQ/L (ref 3.5–5.1)
PROTEIN, POC: ABNORMAL
RBC # BLD: 4.3 M/UL (ref 4.2–5.4)
SODIUM BLD-SCNC: 143 MEQ/L (ref 132–144)
SPECIFIC GRAVITY, POC: 1.02
TOTAL IRON BINDING CAPACITY: 295 UG/DL (ref 178–450)
TOTAL PROTEIN: 7 G/DL (ref 6.4–8.1)
UROBILINOGEN, POC: ABNORMAL
VITAMIN B-12: 311 PG/ML (ref 232–1245)
WBC # BLD: 7.4 K/UL (ref 4.8–10.8)

## 2018-03-22 PROCEDURE — 1090F PRES/ABSN URINE INCON ASSESS: CPT | Performed by: FAMILY MEDICINE

## 2018-03-22 PROCEDURE — 4040F PNEUMOC VAC/ADMIN/RCVD: CPT | Performed by: FAMILY MEDICINE

## 2018-03-22 PROCEDURE — 3017F COLORECTAL CA SCREEN DOC REV: CPT | Performed by: FAMILY MEDICINE

## 2018-03-22 PROCEDURE — G8399 PT W/DXA RESULTS DOCUMENT: HCPCS | Performed by: FAMILY MEDICINE

## 2018-03-22 PROCEDURE — 1036F TOBACCO NON-USER: CPT | Performed by: FAMILY MEDICINE

## 2018-03-22 PROCEDURE — 3014F SCREEN MAMMO DOC REV: CPT | Performed by: FAMILY MEDICINE

## 2018-03-22 PROCEDURE — 99214 OFFICE O/P EST MOD 30 MIN: CPT | Performed by: FAMILY MEDICINE

## 2018-03-22 PROCEDURE — 1123F ACP DISCUSS/DSCN MKR DOCD: CPT | Performed by: FAMILY MEDICINE

## 2018-03-22 PROCEDURE — G8427 DOCREV CUR MEDS BY ELIG CLIN: HCPCS | Performed by: FAMILY MEDICINE

## 2018-03-22 PROCEDURE — G8482 FLU IMMUNIZE ORDER/ADMIN: HCPCS | Performed by: FAMILY MEDICINE

## 2018-03-22 PROCEDURE — G8598 ASA/ANTIPLAT THER USED: HCPCS | Performed by: FAMILY MEDICINE

## 2018-03-22 PROCEDURE — G8417 CALC BMI ABV UP PARAM F/U: HCPCS | Performed by: FAMILY MEDICINE

## 2018-03-22 RX ORDER — ERGOCALCIFEROL 1.25 MG/1
50000 CAPSULE ORAL WEEKLY
Qty: 12 CAPSULE | Refills: 0 | Status: SHIPPED | OUTPATIENT
Start: 2018-03-22 | End: 2018-04-10 | Stop reason: SDUPTHER

## 2018-03-22 RX ORDER — CELECOXIB 200 MG/1
200 CAPSULE ORAL DAILY
Qty: 30 CAPSULE | Refills: 1 | Status: SHIPPED | OUTPATIENT
Start: 2018-03-22 | End: 2018-03-28

## 2018-03-22 RX ORDER — LORAZEPAM 1 MG/1
TABLET ORAL
Qty: 270 TABLET | Refills: 0 | Status: SHIPPED | OUTPATIENT
Start: 2018-03-22 | End: 2018-04-19

## 2018-03-22 RX ORDER — FUROSEMIDE 20 MG/1
20 TABLET ORAL DAILY
Qty: 30 TABLET | Refills: 0 | Status: SHIPPED | OUTPATIENT
Start: 2018-03-22 | End: 2018-06-30 | Stop reason: SDUPTHER

## 2018-03-22 RX ORDER — AMOXICILLIN 500 MG/1
CAPSULE ORAL
Refills: 0 | COMMUNITY
Start: 2018-03-07 | End: 2018-06-30

## 2018-03-22 ASSESSMENT — ENCOUNTER SYMPTOMS
CHOKING: 0
FACIAL SWELLING: 0
PHOTOPHOBIA: 0
EYE DISCHARGE: 0
EYE PAIN: 0
EYE REDNESS: 0
CONSTIPATION: 0
ABDOMINAL PAIN: 0
DIARRHEA: 0
APNEA: 0
STRIDOR: 0
COLOR CHANGE: 0
VOMITING: 0
WHEEZING: 0
CHEST TIGHTNESS: 0
NAUSEA: 0

## 2018-03-22 ASSESSMENT — PATIENT HEALTH QUESTIONNAIRE - PHQ9
1. LITTLE INTEREST OR PLEASURE IN DOING THINGS: 0
SUM OF ALL RESPONSES TO PHQ9 QUESTIONS 1 & 2: 0
SUM OF ALL RESPONSES TO PHQ QUESTIONS 1-9: 0
2. FEELING DOWN, DEPRESSED OR HOPELESS: 0

## 2018-03-22 NOTE — PROGRESS NOTES
loss and mouth sores. Eyes: Negative for photophobia, pain, discharge and redness. Respiratory: Negative for apnea, choking, chest tightness, wheezing and stridor. Cardiovascular: Negative for chest pain, palpitations and leg swelling. Gastrointestinal: Negative for abdominal pain, constipation, diarrhea, nausea and vomiting. Endocrine: Negative for cold intolerance, heat intolerance, polydipsia and polyphagia. Genitourinary: Positive for decreased urine volume, frequency, hesitancy and urgency. Negative for dysuria, flank pain and hematuria. Musculoskeletal: Positive for arthralgias, gait problem and joint swelling. Negative for neck pain and neck stiffness. Skin: Negative for color change, pallor, rash and wound. Allergic/Immunologic: Negative for immunocompromised state. Neurological: Negative for tremors, syncope, facial asymmetry, speech difficulty and headaches. Psychiatric/Behavioral: Negative for confusion and hallucinations. The patient is not nervous/anxious and is not hyperactive. Objective:   /76 (Site: Right Arm, Position: Sitting, Cuff Size: Small Adult)   Pulse 58   Temp 97.2 °F (36.2 °C) (Tympanic)   Resp 16   Ht 5' 4\" (1.626 m)   Wt 165 lb (74.8 kg)   LMP  (LMP Unknown)   SpO2 99%   Breastfeeding? No   BMI 28.32 kg/m²     Physical Exam   Constitutional: She is oriented to person, place, and time. She appears well-developed and well-nourished. HENT:   Head: Normocephalic and atraumatic. Eyes: Conjunctivae and EOM are normal. Pupils are equal, round, and reactive to light. Neck: Normal range of motion. Neck supple. No thyromegaly present. Cardiovascular: Normal rate, regular rhythm and normal heart sounds. No murmur heard. Pulmonary/Chest: Effort normal and breath sounds normal. She has no wheezes. She exhibits no tenderness. Abdominal: Soft. Bowel sounds are normal. There is no tenderness. Musculoskeletal: Normal range of motion.  She exhibits tenderness. She exhibits no edema. Left knee: She exhibits swelling. Tenderness found. Medial joint line tenderness noted. Lymphadenopathy:     She has no cervical adenopathy. Neurological: She is alert and oriented to person, place, and time. She has normal reflexes. Coordination normal.   Skin: Skin is warm and dry. No rash noted. Psychiatric: She has a normal mood and affect. Thought content normal.   Nursing note and vitals reviewed. Assessment:     1. Dysuria  POCT Urinalysis no Micro   2. PADMINI (generalized anxiety disorder)  LORazepam (ATIVAN) 1 MG tablet   3. Anemia due to vitamin B12 deficiency, unspecified B12 deficiency type  CBC Auto Differential    Folate    Vitamin B12    Iron and TIBC   4. Electrolyte imbalance  Comprehensive Metabolic Panel   5. Status post total left knee replacement  amoxicillin (AMOXIL) 500 MG capsule    celecoxib (CELEBREX) 200 MG capsule   6. Vitamin D deficiency  vitamin D (ERGOCALCIFEROL) 97533 units CAPS capsule   7. Peripheral edema  furosemide (LASIX) 20 MG tablet       Plan:      Orders Placed This Encounter   Procedures    Comprehensive Metabolic Panel     Standing Status:   Future     Number of Occurrences:   1     Standing Expiration Date:   4/22/2018    CBC Auto Differential     Standing Status:   Future     Number of Occurrences:   1     Standing Expiration Date:   3/22/2019    Folate     Standing Status:   Future     Number of Occurrences:   1     Standing Expiration Date:   4/22/2018    Vitamin B12     Standing Status:   Future     Number of Occurrences:   1     Standing Expiration Date:   4/22/2018    Iron and TIBC     Standing Status:   Future     Number of Occurrences:   1     Standing Expiration Date:   4/22/2018     Order Specific Question:   Is Patient Fasting? Answer:   yes     Order Specific Question:   No of Hours?      Answer:   10    POCT Urinalysis no Micro     Orders Placed This Encounter   Medications    LORazepam

## 2018-03-26 ENCOUNTER — TELEPHONE (OUTPATIENT)
Dept: PRIMARY CARE CLINIC | Age: 68
End: 2018-03-26

## 2018-03-27 NOTE — TELEPHONE ENCOUNTER
Pt. Pt believes that her symptoms are actually a side effect of the Celebrex. She would like to know if it's of to just dc or does she need to taper off. Please advise.

## 2018-03-28 DIAGNOSIS — E53.8 B12 DEFICIENCY: Primary | ICD-10-CM

## 2018-04-10 DIAGNOSIS — E55.9 VITAMIN D DEFICIENCY: ICD-10-CM

## 2018-04-10 RX ORDER — ERGOCALCIFEROL 1.25 MG/1
50000 CAPSULE ORAL WEEKLY
Qty: 12 CAPSULE | Refills: 0 | Status: SHIPPED | OUTPATIENT
Start: 2018-04-10 | End: 2021-02-25

## 2018-04-24 ENCOUNTER — OFFICE VISIT (OUTPATIENT)
Dept: PRIMARY CARE CLINIC | Age: 68
End: 2018-04-24
Payer: MEDICARE

## 2018-04-24 ENCOUNTER — TELEPHONE (OUTPATIENT)
Dept: PRIMARY CARE CLINIC | Age: 68
End: 2018-04-24

## 2018-04-24 VITALS
BODY MASS INDEX: 28.17 KG/M2 | RESPIRATION RATE: 14 BRPM | HEIGHT: 64 IN | SYSTOLIC BLOOD PRESSURE: 120 MMHG | HEART RATE: 68 BPM | DIASTOLIC BLOOD PRESSURE: 78 MMHG | WEIGHT: 165 LBS | TEMPERATURE: 98 F

## 2018-04-24 DIAGNOSIS — E78.5 DYSLIPIDEMIA: ICD-10-CM

## 2018-04-24 DIAGNOSIS — J34.1 CYST OF NASAL CAVITY: ICD-10-CM

## 2018-04-24 DIAGNOSIS — F41.1 GAD (GENERALIZED ANXIETY DISORDER): ICD-10-CM

## 2018-04-24 DIAGNOSIS — L30.9 ECZEMA, UNSPECIFIED TYPE: ICD-10-CM

## 2018-04-24 DIAGNOSIS — R21 RASH: Primary | ICD-10-CM

## 2018-04-24 PROCEDURE — 1090F PRES/ABSN URINE INCON ASSESS: CPT | Performed by: FAMILY MEDICINE

## 2018-04-24 PROCEDURE — 1123F ACP DISCUSS/DSCN MKR DOCD: CPT | Performed by: FAMILY MEDICINE

## 2018-04-24 PROCEDURE — 1036F TOBACCO NON-USER: CPT | Performed by: FAMILY MEDICINE

## 2018-04-24 PROCEDURE — G8417 CALC BMI ABV UP PARAM F/U: HCPCS | Performed by: FAMILY MEDICINE

## 2018-04-24 PROCEDURE — 3017F COLORECTAL CA SCREEN DOC REV: CPT | Performed by: FAMILY MEDICINE

## 2018-04-24 PROCEDURE — G8598 ASA/ANTIPLAT THER USED: HCPCS | Performed by: FAMILY MEDICINE

## 2018-04-24 PROCEDURE — 99213 OFFICE O/P EST LOW 20 MIN: CPT | Performed by: FAMILY MEDICINE

## 2018-04-24 PROCEDURE — G8399 PT W/DXA RESULTS DOCUMENT: HCPCS | Performed by: FAMILY MEDICINE

## 2018-04-24 PROCEDURE — 4040F PNEUMOC VAC/ADMIN/RCVD: CPT | Performed by: FAMILY MEDICINE

## 2018-04-24 PROCEDURE — G8427 DOCREV CUR MEDS BY ELIG CLIN: HCPCS | Performed by: FAMILY MEDICINE

## 2018-04-24 RX ORDER — LORAZEPAM 1 MG/1
1 TABLET ORAL 3 TIMES DAILY PRN
COMMUNITY
End: 2018-08-24 | Stop reason: SDUPTHER

## 2018-04-24 RX ORDER — MUPIROCIN CALCIUM 20 MG/G
CREAM TOPICAL
Qty: 1 TUBE | Refills: 3 | Status: SHIPPED | OUTPATIENT
Start: 2018-04-24 | End: 2018-05-24

## 2018-04-24 ASSESSMENT — ENCOUNTER SYMPTOMS
RESPIRATORY NEGATIVE: 1
ABDOMINAL PAIN: 0
DIARRHEA: 0
WHEEZING: 0
CONSTIPATION: 0
BACK PAIN: 0
RHINORRHEA: 0
GASTROINTESTINAL NEGATIVE: 1
EYE REDNESS: 0
SORE THROAT: 0
VOMITING: 0
NAIL CHANGES: 0
EYES NEGATIVE: 1
SHORTNESS OF BREATH: 0
PHOTOPHOBIA: 0
EYE ITCHING: 0
COUGH: 0
EYE PAIN: 0

## 2018-04-25 ENCOUNTER — TELEPHONE (OUTPATIENT)
Dept: PRIMARY CARE CLINIC | Age: 68
End: 2018-04-25

## 2018-04-28 ENCOUNTER — OFFICE VISIT (OUTPATIENT)
Dept: PRIMARY CARE CLINIC | Age: 68
End: 2018-04-28
Payer: MEDICARE

## 2018-04-28 VITALS
HEART RATE: 72 BPM | DIASTOLIC BLOOD PRESSURE: 60 MMHG | WEIGHT: 167 LBS | HEIGHT: 64 IN | RESPIRATION RATE: 14 BRPM | SYSTOLIC BLOOD PRESSURE: 104 MMHG | TEMPERATURE: 97.7 F | OXYGEN SATURATION: 98 % | BODY MASS INDEX: 28.51 KG/M2

## 2018-04-28 DIAGNOSIS — F41.1 GAD (GENERALIZED ANXIETY DISORDER): ICD-10-CM

## 2018-04-28 DIAGNOSIS — J01.00 ACUTE NON-RECURRENT MAXILLARY SINUSITIS: Primary | ICD-10-CM

## 2018-04-28 PROCEDURE — 99213 OFFICE O/P EST LOW 20 MIN: CPT | Performed by: FAMILY MEDICINE

## 2018-04-28 PROCEDURE — 1036F TOBACCO NON-USER: CPT | Performed by: FAMILY MEDICINE

## 2018-04-28 PROCEDURE — 3017F COLORECTAL CA SCREEN DOC REV: CPT | Performed by: FAMILY MEDICINE

## 2018-04-28 PROCEDURE — 1090F PRES/ABSN URINE INCON ASSESS: CPT | Performed by: FAMILY MEDICINE

## 2018-04-28 PROCEDURE — 96372 THER/PROPH/DIAG INJ SC/IM: CPT | Performed by: FAMILY MEDICINE

## 2018-04-28 PROCEDURE — G8399 PT W/DXA RESULTS DOCUMENT: HCPCS | Performed by: FAMILY MEDICINE

## 2018-04-28 PROCEDURE — 1123F ACP DISCUSS/DSCN MKR DOCD: CPT | Performed by: FAMILY MEDICINE

## 2018-04-28 PROCEDURE — G8598 ASA/ANTIPLAT THER USED: HCPCS | Performed by: FAMILY MEDICINE

## 2018-04-28 PROCEDURE — G8427 DOCREV CUR MEDS BY ELIG CLIN: HCPCS | Performed by: FAMILY MEDICINE

## 2018-04-28 PROCEDURE — 4040F PNEUMOC VAC/ADMIN/RCVD: CPT | Performed by: FAMILY MEDICINE

## 2018-04-28 PROCEDURE — G8417 CALC BMI ABV UP PARAM F/U: HCPCS | Performed by: FAMILY MEDICINE

## 2018-04-28 RX ORDER — AZITHROMYCIN 250 MG/1
TABLET, FILM COATED ORAL
Qty: 6 TABLET | Refills: 0 | Status: SHIPPED | OUTPATIENT
Start: 2018-04-28 | End: 2018-06-30

## 2018-04-28 RX ORDER — CEFTRIAXONE 1 G/1
1 INJECTION, POWDER, FOR SOLUTION INTRAMUSCULAR; INTRAVENOUS ONCE
Status: COMPLETED | OUTPATIENT
Start: 2018-04-28 | End: 2018-04-28

## 2018-04-28 RX ORDER — AZELASTINE 1 MG/ML
2 SPRAY, METERED NASAL 2 TIMES DAILY
Qty: 1 BOTTLE | Refills: 3 | Status: SHIPPED | OUTPATIENT
Start: 2018-04-28 | End: 2021-02-25

## 2018-04-28 RX ADMIN — CEFTRIAXONE 1 G: 1 INJECTION, POWDER, FOR SOLUTION INTRAMUSCULAR; INTRAVENOUS at 10:52

## 2018-05-06 ASSESSMENT — ENCOUNTER SYMPTOMS
SHORTNESS OF BREATH: 0
ABDOMINAL PAIN: 0
RESPIRATORY NEGATIVE: 1
SINUS PRESSURE: 1
BACK PAIN: 0
SORE THROAT: 1
EYE REDNESS: 0
PHOTOPHOBIA: 0
EYES NEGATIVE: 1
RHINORRHEA: 1
WHEEZING: 0
EYE ITCHING: 0
DIARRHEA: 0
GASTROINTESTINAL NEGATIVE: 1
SINUS PAIN: 1
CONSTIPATION: 0

## 2018-06-30 ENCOUNTER — OFFICE VISIT (OUTPATIENT)
Dept: PRIMARY CARE CLINIC | Age: 68
End: 2018-06-30
Payer: MEDICARE

## 2018-06-30 VITALS
HEART RATE: 67 BPM | TEMPERATURE: 97.8 F | OXYGEN SATURATION: 98 % | WEIGHT: 160 LBS | RESPIRATION RATE: 12 BRPM | BODY MASS INDEX: 27.31 KG/M2 | HEIGHT: 64 IN

## 2018-06-30 DIAGNOSIS — R60.9 PERIPHERAL EDEMA: ICD-10-CM

## 2018-06-30 DIAGNOSIS — L25.9 CONTACT DERMATITIS, UNSPECIFIED CONTACT DERMATITIS TYPE, UNSPECIFIED TRIGGER: Primary | ICD-10-CM

## 2018-06-30 PROCEDURE — 1123F ACP DISCUSS/DSCN MKR DOCD: CPT | Performed by: FAMILY MEDICINE

## 2018-06-30 PROCEDURE — 3017F COLORECTAL CA SCREEN DOC REV: CPT | Performed by: FAMILY MEDICINE

## 2018-06-30 PROCEDURE — 1036F TOBACCO NON-USER: CPT | Performed by: FAMILY MEDICINE

## 2018-06-30 PROCEDURE — G8417 CALC BMI ABV UP PARAM F/U: HCPCS | Performed by: FAMILY MEDICINE

## 2018-06-30 PROCEDURE — 4040F PNEUMOC VAC/ADMIN/RCVD: CPT | Performed by: FAMILY MEDICINE

## 2018-06-30 PROCEDURE — G8427 DOCREV CUR MEDS BY ELIG CLIN: HCPCS | Performed by: FAMILY MEDICINE

## 2018-06-30 PROCEDURE — 99213 OFFICE O/P EST LOW 20 MIN: CPT | Performed by: FAMILY MEDICINE

## 2018-06-30 PROCEDURE — G8598 ASA/ANTIPLAT THER USED: HCPCS | Performed by: FAMILY MEDICINE

## 2018-06-30 PROCEDURE — G8399 PT W/DXA RESULTS DOCUMENT: HCPCS | Performed by: FAMILY MEDICINE

## 2018-06-30 PROCEDURE — 1090F PRES/ABSN URINE INCON ASSESS: CPT | Performed by: FAMILY MEDICINE

## 2018-06-30 RX ORDER — PREDNISONE 10 MG/1
TABLET ORAL
Qty: 30 TABLET | Refills: 0 | Status: SHIPPED | OUTPATIENT
Start: 2018-06-30 | End: 2018-07-14

## 2018-06-30 RX ORDER — DESOXIMETASONE 2.5 MG/G
CREAM TOPICAL
Qty: 30 G | Refills: 1 | Status: SHIPPED | OUTPATIENT
Start: 2018-06-30 | End: 2020-04-21 | Stop reason: ALTCHOICE

## 2018-06-30 RX ORDER — TRIAMCINOLONE ACETONIDE 40 MG/ML
80 INJECTION, SUSPENSION INTRA-ARTICULAR; INTRAMUSCULAR ONCE
Status: COMPLETED | OUTPATIENT
Start: 2018-06-30 | End: 2018-06-30

## 2018-06-30 RX ORDER — FUROSEMIDE 20 MG/1
20 TABLET ORAL DAILY
Qty: 30 TABLET | Refills: 0 | Status: SHIPPED | OUTPATIENT
Start: 2018-06-30 | End: 2021-03-09

## 2018-06-30 RX ORDER — LORATADINE 10 MG/1
10 TABLET ORAL DAILY
Qty: 30 TABLET | Refills: 1 | Status: SHIPPED | OUTPATIENT
Start: 2018-06-30

## 2018-06-30 RX ADMIN — TRIAMCINOLONE ACETONIDE 80 MG: 40 INJECTION, SUSPENSION INTRA-ARTICULAR; INTRAMUSCULAR at 11:51

## 2018-07-08 ASSESSMENT — ENCOUNTER SYMPTOMS
CHEST TIGHTNESS: 0
ABDOMINAL DISTENTION: 0
WHEEZING: 0
ABDOMINAL PAIN: 0
APNEA: 0
CHOKING: 0
EYE DISCHARGE: 0

## 2018-08-24 ENCOUNTER — OFFICE VISIT (OUTPATIENT)
Dept: PRIMARY CARE CLINIC | Age: 68
End: 2018-08-24
Payer: MEDICARE

## 2018-08-24 VITALS
DIASTOLIC BLOOD PRESSURE: 78 MMHG | TEMPERATURE: 97.6 F | SYSTOLIC BLOOD PRESSURE: 118 MMHG | RESPIRATION RATE: 14 BRPM | WEIGHT: 155.8 LBS | HEART RATE: 54 BPM | OXYGEN SATURATION: 98 % | BODY MASS INDEX: 26.6 KG/M2 | HEIGHT: 64 IN

## 2018-08-24 DIAGNOSIS — F41.1 GAD (GENERALIZED ANXIETY DISORDER): ICD-10-CM

## 2018-08-24 DIAGNOSIS — M79.89 PAIN AND SWELLING OF LOWER LEG, LEFT: Primary | ICD-10-CM

## 2018-08-24 DIAGNOSIS — M79.662 PAIN AND SWELLING OF LOWER LEG, LEFT: Primary | ICD-10-CM

## 2018-08-24 DIAGNOSIS — Z01.20 VISIT FOR DENTAL EXAMINATION: ICD-10-CM

## 2018-08-24 DIAGNOSIS — M15.9 PRIMARY OSTEOARTHRITIS INVOLVING MULTIPLE JOINTS: ICD-10-CM

## 2018-08-24 PROCEDURE — G8417 CALC BMI ABV UP PARAM F/U: HCPCS | Performed by: FAMILY MEDICINE

## 2018-08-24 PROCEDURE — G8399 PT W/DXA RESULTS DOCUMENT: HCPCS | Performed by: FAMILY MEDICINE

## 2018-08-24 PROCEDURE — 1123F ACP DISCUSS/DSCN MKR DOCD: CPT | Performed by: FAMILY MEDICINE

## 2018-08-24 PROCEDURE — 1036F TOBACCO NON-USER: CPT | Performed by: FAMILY MEDICINE

## 2018-08-24 PROCEDURE — G8427 DOCREV CUR MEDS BY ELIG CLIN: HCPCS | Performed by: FAMILY MEDICINE

## 2018-08-24 PROCEDURE — 3017F COLORECTAL CA SCREEN DOC REV: CPT | Performed by: FAMILY MEDICINE

## 2018-08-24 PROCEDURE — 4040F PNEUMOC VAC/ADMIN/RCVD: CPT | Performed by: FAMILY MEDICINE

## 2018-08-24 PROCEDURE — 1090F PRES/ABSN URINE INCON ASSESS: CPT | Performed by: FAMILY MEDICINE

## 2018-08-24 PROCEDURE — 99214 OFFICE O/P EST MOD 30 MIN: CPT | Performed by: FAMILY MEDICINE

## 2018-08-24 PROCEDURE — 1101F PT FALLS ASSESS-DOCD LE1/YR: CPT | Performed by: FAMILY MEDICINE

## 2018-08-24 PROCEDURE — G8598 ASA/ANTIPLAT THER USED: HCPCS | Performed by: FAMILY MEDICINE

## 2018-08-24 RX ORDER — MELOXICAM 7.5 MG/1
7.5 TABLET ORAL DAILY
Qty: 30 TABLET | Refills: 3 | Status: SHIPPED | OUTPATIENT
Start: 2018-08-24 | End: 2021-02-25

## 2018-08-24 RX ORDER — AMOXICILLIN 500 MG/1
TABLET, FILM COATED ORAL
Qty: 8 TABLET | Refills: 0 | Status: SHIPPED | OUTPATIENT
Start: 2018-08-24 | End: 2018-11-06

## 2018-08-24 RX ORDER — LORAZEPAM 1 MG/1
1 TABLET ORAL 3 TIMES DAILY PRN
Qty: 90 TABLET | Refills: 1 | Status: SHIPPED | OUTPATIENT
Start: 2018-08-24 | End: 2018-09-23

## 2018-08-24 ASSESSMENT — ENCOUNTER SYMPTOMS
EYES NEGATIVE: 1
EYE REDNESS: 0
GASTROINTESTINAL NEGATIVE: 1
SINUS PAIN: 1
DIARRHEA: 0
PHOTOPHOBIA: 0
BACK PAIN: 1
WHEEZING: 0
EYE ITCHING: 0
CONSTIPATION: 0

## 2018-08-24 NOTE — PROGRESS NOTES
Subjective:      Patient ID: Ga Hameed is a 78 y.o. female who presents today for:  Chief Complaint   Patient presents with    Anxiety     Patient is here for anxiety and increased stress but is having chest tightness that raditates under both arms and to the back. Is this the lorazapam or affect of B12 or stress? She will need Lorazapam refills [facility] if no changes occur. She has not been taking the 3 x per day and only as needed.  Joint Swelling     Left knee had surgery replacement on 12/5/17 and she still has calf numbness and swelling. Should she be using the gabapentin given by surgeon. She has not used due to a history issue.  Other     Patient will need amoxicillin for Dentist routine cleaning coming up in September. Since knee surgery she needs antibiotics or the dentist will not do anything.  Sinusitis     Patient is here for a 4 month follow up on the sinusitis. Sinus drainage, headaches, and some coughing from drainage symptoms are still occurring. HPI   Anxiety  Pt presents today for anxiety and stress. She is currently taking Ativam 1mg TID. She is taking this medication PRN with moderate relief. She has been experiencing chest tightness with bilateral arm pain and back pain. Post op check  Pt had left knee replacement on 12/5/17. She admits to lower leg numbness and swelling. She was started on Gabapentin with mild relief. She is in need of an RX for Amoxicillin for a dental cleaning in September. Sinusitis  Pt was seen in April for sinusitis. She admits to still having Sinus drainage, headaches, and cough.      Past Medical History:   Diagnosis Date    Allergic rhinitis, Dr Carina Martinez 10/28/2013    Anxiety     Back pain     CAD (coronary artery disease)     Cat bite involving extremity 11/5/2013    Cat bite involving extremity 11/5/2013    Cellulitis 11/5/2013    Cellulitis, improving 11/5/2013    Cervical radiculitis 1/13/2014    Cervical for constipation and diarrhea. Genitourinary: Negative. Negative for hematuria, pelvic pain and urgency. Musculoskeletal: Positive for back pain. Skin: Negative. Psychiatric/Behavioral: Negative. Negative for agitation and behavioral problems. The patient is not nervous/anxious. Objective:   /78 (Site: Left Arm, Position: Sitting, Cuff Size: Medium Adult)   Pulse 54   Temp 97.6 °F (36.4 °C) (Tympanic)   Resp 14   Ht 5' 4\" (1.626 m)   Wt 155 lb 12.8 oz (70.7 kg)   LMP  (LMP Unknown)   SpO2 98%   BMI 26.74 kg/m²     Physical Exam   Constitutional: She is oriented to person, place, and time. She appears well-developed and well-nourished. HENT:   Head: Normocephalic and atraumatic. Nose: Mucosal edema, rhinorrhea and sinus tenderness present. Eyes: Pupils are equal, round, and reactive to light. Conjunctivae and EOM are normal.   Neck: Normal range of motion. Neck supple. No thyromegaly present. Cardiovascular: Normal rate, regular rhythm and normal heart sounds. No murmur heard. Pulmonary/Chest: Effort normal and breath sounds normal. She has no wheezes. She exhibits no tenderness. Abdominal: Soft. Bowel sounds are normal. There is no tenderness. Musculoskeletal: Normal range of motion. She exhibits edema and tenderness. Left lower leg: She exhibits swelling. Lymphadenopathy:     She has no cervical adenopathy. Neurological: She is alert and oriented to person, place, and time. She has normal reflexes. Coordination normal.   Skin: Skin is warm and dry. No rash noted. Psychiatric: She has a normal mood and affect. Thought content normal.   Nursing note and vitals reviewed. Assessment:      Diagnosis Orders   1. Pain and swelling of lower leg, left  EMG   2. PADMINI (generalized anxiety disorder)  LORazepam (ATIVAN) 1 MG tablet   3. Visit for dental examination  Amoxicillin 500 MG TABS   4.  Primary osteoarthritis involving multiple joints  meloxicam (MOBIC) 7.5 MG tablet       Plan:      Orders Placed This Encounter   Procedures    EMG     Standing Status:   Future     Standing Expiration Date:   10/23/2018     Orders Placed This Encounter   Medications    LORazepam (ATIVAN) 1 MG tablet     Sig: Take 1 tablet by mouth 3 times daily as needed for Anxiety for up to 30 days. .     Dispense:  90 tablet     Refill:  1    Amoxicillin 500 MG TABS     Si tablets in the morning and 2 tablets at night on the day of teeth cleaning. Dispense:  8 tablet     Refill:  0    meloxicam (MOBIC) 7.5 MG tablet     Sig: Take 1 tablet by mouth daily     Dispense:  30 tablet     Refill:  3       Controlled Substances Monitoring:      Return in about 3 weeks (around 2018) for Routine follow up, Review of Labs & Diagnostic Testing. SANDEE Child, [facility]  , am scribing for and in the presence of Prince Huang DO. Electronically signed by :  SANDEE Ambrosio, [address] [facility], Prince Huang DO, personally performed the services described in this documentation, as scribed by Venkat Tucker in my presence, and it is both accurate and complete.  Electronically signed by: Prince Huang DO    18 10:32 PM    Prince Huang DO

## 2018-09-10 ENCOUNTER — TELEPHONE (OUTPATIENT)
Dept: PRIMARY CARE CLINIC | Age: 68
End: 2018-09-10

## 2018-09-10 DIAGNOSIS — R29.898 WEAKNESS OF LOWER EXTREMITY, UNSPECIFIED LATERALITY: Primary | ICD-10-CM

## 2018-09-10 RX ORDER — GABAPENTIN 300 MG/1
300 CAPSULE ORAL 3 TIMES DAILY
Qty: 90 CAPSULE | Refills: 2 | Status: SHIPPED | OUTPATIENT
Start: 2018-09-10 | End: 2018-11-06 | Stop reason: SDUPTHER

## 2018-09-10 NOTE — TELEPHONE ENCOUNTER
Pt called stating she wants EMG referral to go to [facility].  Dr. Christopher Murcia office requested a new referral.

## 2018-09-11 ENCOUNTER — OFFICE VISIT (OUTPATIENT)
Dept: PHYSICAL MEDICINE AND REHAB | Age: 68
End: 2018-09-11
Payer: MEDICARE

## 2018-09-11 DIAGNOSIS — R20.0 RIGHT LEG NUMBNESS: Primary | ICD-10-CM

## 2018-09-11 DIAGNOSIS — M54.17 LUMBOSACRAL RADICULOPATHY AT L5: ICD-10-CM

## 2018-09-11 DIAGNOSIS — M54.17 LUMBOSACRAL RADICULOPATHY AT S1: ICD-10-CM

## 2018-09-11 PROBLEM — M17.12 OSTEOARTHRITIS OF LEFT KNEE: Status: ACTIVE | Noted: 2017-11-08

## 2018-09-11 PROBLEM — J44.9 COPD (CHRONIC OBSTRUCTIVE PULMONARY DISEASE) (HCC): Status: ACTIVE | Noted: 2017-01-03

## 2018-09-11 PROBLEM — M48.9 CERVICAL SPINE DISEASE: Status: ACTIVE | Noted: 2018-09-11

## 2018-09-11 PROBLEM — G43.909 MIGRAINE: Status: ACTIVE | Noted: 2018-09-11

## 2018-09-11 PROCEDURE — 95913 NRV CNDJ TEST 13/> STUDIES: CPT | Performed by: PHYSICAL MEDICINE & REHABILITATION

## 2018-09-11 PROCEDURE — 95886 MUSC TEST DONE W/N TEST COMP: CPT | Performed by: PHYSICAL MEDICINE & REHABILITATION

## 2018-09-25 ENCOUNTER — OFFICE VISIT (OUTPATIENT)
Dept: PRIMARY CARE CLINIC | Age: 68
End: 2018-09-25
Payer: MEDICARE

## 2018-09-25 VITALS
OXYGEN SATURATION: 98 % | WEIGHT: 153 LBS | HEIGHT: 64 IN | SYSTOLIC BLOOD PRESSURE: 124 MMHG | BODY MASS INDEX: 26.12 KG/M2 | DIASTOLIC BLOOD PRESSURE: 72 MMHG | HEART RATE: 52 BPM | TEMPERATURE: 97.9 F | RESPIRATION RATE: 16 BRPM

## 2018-09-25 DIAGNOSIS — Z23 NEED FOR INFLUENZA VACCINATION: ICD-10-CM

## 2018-09-25 DIAGNOSIS — J41.8 MIXED SIMPLE AND MUCOPURULENT CHRONIC BRONCHITIS (HCC): ICD-10-CM

## 2018-09-25 DIAGNOSIS — J41.0 SIMPLE CHRONIC BRONCHITIS (HCC): Primary | ICD-10-CM

## 2018-09-25 DIAGNOSIS — F41.9 ANXIETY: ICD-10-CM

## 2018-09-25 DIAGNOSIS — E78.5 DYSLIPIDEMIA: ICD-10-CM

## 2018-09-25 DIAGNOSIS — M54.16 LUMBAR BACK PAIN WITH RADICULOPATHY AFFECTING LEFT LOWER EXTREMITY: ICD-10-CM

## 2018-09-25 PROCEDURE — G8926 SPIRO NO PERF OR DOC: HCPCS | Performed by: FAMILY MEDICINE

## 2018-09-25 PROCEDURE — 4040F PNEUMOC VAC/ADMIN/RCVD: CPT | Performed by: FAMILY MEDICINE

## 2018-09-25 PROCEDURE — 90662 IIV NO PRSV INCREASED AG IM: CPT | Performed by: FAMILY MEDICINE

## 2018-09-25 PROCEDURE — 1090F PRES/ABSN URINE INCON ASSESS: CPT | Performed by: FAMILY MEDICINE

## 2018-09-25 PROCEDURE — 3023F SPIROM DOC REV: CPT | Performed by: FAMILY MEDICINE

## 2018-09-25 PROCEDURE — 1123F ACP DISCUSS/DSCN MKR DOCD: CPT | Performed by: FAMILY MEDICINE

## 2018-09-25 PROCEDURE — G8399 PT W/DXA RESULTS DOCUMENT: HCPCS | Performed by: FAMILY MEDICINE

## 2018-09-25 PROCEDURE — 3017F COLORECTAL CA SCREEN DOC REV: CPT | Performed by: FAMILY MEDICINE

## 2018-09-25 PROCEDURE — G8427 DOCREV CUR MEDS BY ELIG CLIN: HCPCS | Performed by: FAMILY MEDICINE

## 2018-09-25 PROCEDURE — G0008 ADMIN INFLUENZA VIRUS VAC: HCPCS | Performed by: FAMILY MEDICINE

## 2018-09-25 PROCEDURE — 99214 OFFICE O/P EST MOD 30 MIN: CPT | Performed by: FAMILY MEDICINE

## 2018-09-25 PROCEDURE — 1101F PT FALLS ASSESS-DOCD LE1/YR: CPT | Performed by: FAMILY MEDICINE

## 2018-09-25 PROCEDURE — G8417 CALC BMI ABV UP PARAM F/U: HCPCS | Performed by: FAMILY MEDICINE

## 2018-09-25 PROCEDURE — G8598 ASA/ANTIPLAT THER USED: HCPCS | Performed by: FAMILY MEDICINE

## 2018-09-25 PROCEDURE — 1036F TOBACCO NON-USER: CPT | Performed by: FAMILY MEDICINE

## 2018-09-25 ASSESSMENT — ENCOUNTER SYMPTOMS
DIARRHEA: 0
EYE ITCHING: 0
NAIL CHANGES: 0
VOMITING: 0
WHEEZING: 0
SHORTNESS OF BREATH: 0
GASTROINTESTINAL NEGATIVE: 1
EYES NEGATIVE: 1
RHINORRHEA: 0
CONSTIPATION: 0
COUGH: 0
EYE REDNESS: 0
SORE THROAT: 0
PHOTOPHOBIA: 0
BACK PAIN: 0
RESPIRATORY NEGATIVE: 1
EYE PAIN: 0
ABDOMINAL PAIN: 0

## 2018-09-25 NOTE — PROGRESS NOTES
Vaccine Information Sheet, \"Influenza - Inactivated\"  given to Benoit Evans, or parent/legal guardian of  Benoit Evans and verbalized understanding. Patient responses:    Have you ever had a reaction to a flu vaccine? No  Are you able to eat eggs without adverse effects? Yes  Do you have any current illness? No  Have you ever had [place] Syndrome? No    Flu vaccine given per order. Please see immunization tab.

## 2018-09-25 NOTE — LETTER
[facility]  [address] [facility] [address]  Phone: [phone]  Fax: [phone] [facility],         September 25, 2018     Patient: Aurora Maravilla   YOB: 1950   Date of Visit: 9/25/2018       To Whom It May Concern: It is my medical opinion that Jumana Hernandez requires a disability parking placard for the following reasons:  She cannot walk 200 feet without stopping to rest.  Duration of need: 2 years    If you have any questions or concerns, please don't hesitate to call.     Sincerely,        Cleve Garvey, DO

## 2018-09-25 NOTE — PROGRESS NOTES
Tobacco abuse, quit 9/3/13      Past Surgical History:   Procedure Laterality Date    APPENDECTOMY      CARDIAC CATHETERIZATION  9/5/13    DR. MAGGIE Samuels    fusion    CHOLECYSTECTOMY      COLONOSCOPY  2009    COSMETIC SURGERY  2010    on eye    NM TOTAL KNEE ARTHROPLASTY Left 12/5/2017    LEFT TOTAL KNEE ARTHROPLASTY SUPINE BELEN PSI SPINAL (OUTSIDE PAT DR. OLIVA) performed by Conrad Taylor MD at [address] [facility]     Family History   Problem Relation Age of Onset    Heart Disease Father         MI    High Blood Pressure Father     Depression Mother      Social History     Social History    Marital status: Single     Spouse name: N/A    Number of children: N/A    Years of education: N/A     Occupational History    Homemaker      Social History Main Topics    Smoking status: Former Smoker     Packs/day: 0.50     Years: 20.00     Types: Cigarettes     Quit date: 9/6/2013    Smokeless tobacco: Never Used    Alcohol use No    Drug use: Unknown    Sexual activity: Not on file     Other Topics Concern    Not on file     Social History Narrative    Social/Functional History              Allergies:  Keflex [cephalexin]; Nickel; Simvastatin; and Ultram [tramadol hcl]    Review of Systems   Constitutional: Negative. Negative for activity change, appetite change, fatigue and fever. HENT: Negative. Negative for congestion, rhinorrhea and sore throat. Eyes: Negative. Negative for photophobia, pain, redness and itching. Respiratory: Negative. Negative for cough, shortness of breath and wheezing. Cardiovascular: Negative. Gastrointestinal: Negative. Negative for abdominal pain, anorexia, constipation, diarrhea and vomiting. Genitourinary: Negative. Negative for hematuria, pelvic pain and urgency. Musculoskeletal: Negative. Negative for back pain and joint pain. Skin: Positive for rash. Negative for nail changes.    Neurological: Expiration Date:   9/25/2019    INFLUENZA, HIGH DOSE, 65 YRS +, IM, PF, PREFILL SYR, 0.5ML (FLUZONE HD)         Controlled Substances Monitoring:      Return in about 3 months (around 12/25/2018) for Routine follow up, Review of Labs & Diagnostic Testing. Francy Cogan, RMA, CMA  , am scribing for and in the presence of Bernie Turcios DO. Electronically signed by :  SANDEE Rosado, [address] [facility], Bernie Turcios DO, personally performed the services described in this documentation, as scribed by Abdelrahman david in my presence, and it is both accurate and complete.  Electronically signed by: Bernie Turcios DO    9/25/18 10:38 PM    Bernie Turcios DO

## 2018-10-01 ENCOUNTER — TELEPHONE (OUTPATIENT)
Dept: PRIMARY CARE CLINIC | Age: 68
End: 2018-10-01

## 2018-10-02 DIAGNOSIS — M51.36 DDD (DEGENERATIVE DISC DISEASE), LUMBAR: Primary | ICD-10-CM

## 2018-10-02 DIAGNOSIS — M51.36 DDD (DEGENERATIVE DISC DISEASE), LUMBAR: ICD-10-CM

## 2018-10-02 DIAGNOSIS — M48.9 CERVICAL SPINE DISEASE: ICD-10-CM

## 2018-10-02 DIAGNOSIS — M47.896 OTHER OSTEOARTHRITIS OF SPINE, LUMBAR REGION: Primary | ICD-10-CM

## 2018-10-19 ENCOUNTER — TELEPHONE (OUTPATIENT)
Dept: PRIMARY CARE CLINIC | Age: 68
End: 2018-10-19

## 2018-10-20 NOTE — TELEPHONE ENCOUNTER
Called prescription for macrobid 100 mg  into [facility] [place] in [facility]

## 2018-11-06 ENCOUNTER — OFFICE VISIT (OUTPATIENT)
Dept: PRIMARY CARE CLINIC | Age: 68
End: 2018-11-06
Payer: MEDICARE

## 2018-11-06 VITALS
SYSTOLIC BLOOD PRESSURE: 110 MMHG | BODY MASS INDEX: 25.61 KG/M2 | WEIGHT: 150 LBS | OXYGEN SATURATION: 98 % | HEART RATE: 50 BPM | RESPIRATION RATE: 14 BRPM | DIASTOLIC BLOOD PRESSURE: 70 MMHG | HEIGHT: 64 IN | TEMPERATURE: 97.8 F

## 2018-11-06 DIAGNOSIS — M54.16 LUMBAR BACK PAIN WITH RADICULOPATHY AFFECTING LEFT LOWER EXTREMITY: Primary | ICD-10-CM

## 2018-11-06 DIAGNOSIS — M25.562 CHRONIC PAIN OF LEFT KNEE: ICD-10-CM

## 2018-11-06 DIAGNOSIS — M51.36 DDD (DEGENERATIVE DISC DISEASE), LUMBAR: ICD-10-CM

## 2018-11-06 DIAGNOSIS — R35.0 FREQUENCY OF URINATION: ICD-10-CM

## 2018-11-06 DIAGNOSIS — F41.9 ANXIETY: ICD-10-CM

## 2018-11-06 DIAGNOSIS — G89.29 CHRONIC PAIN OF LEFT KNEE: ICD-10-CM

## 2018-11-06 DIAGNOSIS — M17.12 PRIMARY OSTEOARTHRITIS OF LEFT KNEE: ICD-10-CM

## 2018-11-06 LAB
BILIRUBIN, POC: NORMAL
BLOOD URINE, POC: NORMAL
CLARITY, POC: NORMAL
COLOR, POC: NORMAL
GLUCOSE URINE, POC: NORMAL
KETONES, POC: NORMAL
LEUKOCYTE EST, POC: NORMAL
NITRITE, POC: NORMAL
PH, POC: 6
PROTEIN, POC: NORMAL
SPECIFIC GRAVITY, POC: 1.01
UROBILINOGEN, POC: NORMAL

## 2018-11-06 PROCEDURE — 1123F ACP DISCUSS/DSCN MKR DOCD: CPT | Performed by: FAMILY MEDICINE

## 2018-11-06 PROCEDURE — 4040F PNEUMOC VAC/ADMIN/RCVD: CPT | Performed by: FAMILY MEDICINE

## 2018-11-06 PROCEDURE — 1036F TOBACCO NON-USER: CPT | Performed by: FAMILY MEDICINE

## 2018-11-06 PROCEDURE — 3017F COLORECTAL CA SCREEN DOC REV: CPT | Performed by: FAMILY MEDICINE

## 2018-11-06 PROCEDURE — 99214 OFFICE O/P EST MOD 30 MIN: CPT | Performed by: FAMILY MEDICINE

## 2018-11-06 PROCEDURE — G8598 ASA/ANTIPLAT THER USED: HCPCS | Performed by: FAMILY MEDICINE

## 2018-11-06 PROCEDURE — G8482 FLU IMMUNIZE ORDER/ADMIN: HCPCS | Performed by: FAMILY MEDICINE

## 2018-11-06 PROCEDURE — G8399 PT W/DXA RESULTS DOCUMENT: HCPCS | Performed by: FAMILY MEDICINE

## 2018-11-06 PROCEDURE — 81002 URINALYSIS NONAUTO W/O SCOPE: CPT | Performed by: FAMILY MEDICINE

## 2018-11-06 PROCEDURE — G8417 CALC BMI ABV UP PARAM F/U: HCPCS | Performed by: FAMILY MEDICINE

## 2018-11-06 PROCEDURE — 1101F PT FALLS ASSESS-DOCD LE1/YR: CPT | Performed by: FAMILY MEDICINE

## 2018-11-06 PROCEDURE — 1090F PRES/ABSN URINE INCON ASSESS: CPT | Performed by: FAMILY MEDICINE

## 2018-11-06 PROCEDURE — G8427 DOCREV CUR MEDS BY ELIG CLIN: HCPCS | Performed by: FAMILY MEDICINE

## 2018-11-06 RX ORDER — LORAZEPAM 1 MG/1
TABLET ORAL
Refills: 1 | COMMUNITY
Start: 2018-10-15 | End: 2018-11-06 | Stop reason: SDUPTHER

## 2018-11-06 RX ORDER — GABAPENTIN 300 MG/1
300 CAPSULE ORAL 3 TIMES DAILY
Qty: 90 CAPSULE | Refills: 3 | Status: SHIPPED | OUTPATIENT
Start: 2018-11-06 | End: 2021-02-25

## 2018-11-06 RX ORDER — LORAZEPAM 1 MG/1
TABLET ORAL
Qty: 60 TABLET | Refills: 1 | Status: SHIPPED | OUTPATIENT
Start: 2018-11-06 | End: 2019-01-31 | Stop reason: SDUPTHER

## 2018-11-06 ASSESSMENT — ENCOUNTER SYMPTOMS
RESPIRATORY NEGATIVE: 1
SHORTNESS OF BREATH: 0
DIARRHEA: 0
BOWEL INCONTINENCE: 0
PHOTOPHOBIA: 0
EYE REDNESS: 0
NAUSEA: 0
EYES NEGATIVE: 1
GASTROINTESTINAL NEGATIVE: 1
CONSTIPATION: 0
WHEEZING: 0
BACK PAIN: 1
VOMITING: 0
EYE ITCHING: 0
ABDOMINAL PAIN: 0

## 2018-11-08 LAB — URINE CULTURE, ROUTINE: NORMAL

## 2018-11-21 ENCOUNTER — TELEPHONE (OUTPATIENT)
Dept: PRIMARY CARE CLINIC | Age: 68
End: 2018-11-21

## 2018-11-27 ENCOUNTER — CARE COORDINATION (OUTPATIENT)
Dept: CARE COORDINATION | Age: 68
End: 2018-11-27

## 2018-12-05 ENCOUNTER — CARE COORDINATION (OUTPATIENT)
Dept: CASE MANAGEMENT | Age: 68
End: 2018-12-05

## 2018-12-05 ENCOUNTER — CARE COORDINATION (OUTPATIENT)
Dept: CARE COORDINATION | Age: 68
End: 2018-12-05

## 2019-01-31 DIAGNOSIS — F41.9 ANXIETY: ICD-10-CM

## 2019-02-01 RX ORDER — LORAZEPAM 1 MG/1
TABLET ORAL
Qty: 60 TABLET | Refills: 1 | Status: SHIPPED | OUTPATIENT
Start: 2019-02-01 | End: 2019-03-02

## 2019-02-13 ENCOUNTER — TELEPHONE (OUTPATIENT)
Dept: FAMILY MEDICINE CLINIC | Age: 69
End: 2019-02-13

## 2019-03-05 ENCOUNTER — TELEPHONE (OUTPATIENT)
Dept: ADMINISTRATIVE | Age: 69
End: 2019-03-05

## 2019-03-18 ENCOUNTER — TELEPHONE (OUTPATIENT)
Dept: PRIMARY CARE CLINIC | Age: 69
End: 2019-03-18

## 2019-03-21 DIAGNOSIS — R20.0 LEG NUMBNESS: ICD-10-CM

## 2019-03-21 DIAGNOSIS — R20.0 NUMBNESS OF LEFT FOOT: Primary | ICD-10-CM

## 2020-01-14 LAB
CHOLESTEROL/HDL RATIO: 3.4
CHOLESTEROL: 203 MG/DL (ref 0–199)
HDLC SERPL-MCNC: 60 MG/DL
LDL CHOLESTEROL: 131 MG/DL (ref 0–99)
TRIGL SERPL-MCNC: 61 MG/DL (ref 0–149)
VLDLC SERPL CALC-MCNC: 12 MG/DL (ref 0–40)

## 2020-04-21 ENCOUNTER — OFFICE VISIT (OUTPATIENT)
Dept: PRIMARY CARE CLINIC | Age: 70
End: 2020-04-21
Payer: MEDICARE

## 2020-04-21 VITALS
BODY MASS INDEX: 26.61 KG/M2 | WEIGHT: 155 LBS | OXYGEN SATURATION: 98 % | TEMPERATURE: 97.2 F | HEART RATE: 60 BPM | SYSTOLIC BLOOD PRESSURE: 106 MMHG | DIASTOLIC BLOOD PRESSURE: 70 MMHG | RESPIRATION RATE: 16 BRPM

## 2020-04-21 DIAGNOSIS — Z20.822 SUSPECTED COVID-19 VIRUS INFECTION: ICD-10-CM

## 2020-04-21 PROCEDURE — 99213 OFFICE O/P EST LOW 20 MIN: CPT | Performed by: NURSE PRACTITIONER

## 2020-04-21 PROCEDURE — 1036F TOBACCO NON-USER: CPT | Performed by: NURSE PRACTITIONER

## 2020-04-21 PROCEDURE — 4040F PNEUMOC VAC/ADMIN/RCVD: CPT | Performed by: NURSE PRACTITIONER

## 2020-04-21 PROCEDURE — G8419 CALC BMI OUT NRM PARAM NOF/U: HCPCS | Performed by: NURSE PRACTITIONER

## 2020-04-21 PROCEDURE — 1123F ACP DISCUSS/DSCN MKR DOCD: CPT | Performed by: NURSE PRACTITIONER

## 2020-04-21 PROCEDURE — G8427 DOCREV CUR MEDS BY ELIG CLIN: HCPCS | Performed by: NURSE PRACTITIONER

## 2020-04-21 PROCEDURE — 3017F COLORECTAL CA SCREEN DOC REV: CPT | Performed by: NURSE PRACTITIONER

## 2020-04-21 PROCEDURE — 1090F PRES/ABSN URINE INCON ASSESS: CPT | Performed by: NURSE PRACTITIONER

## 2020-04-21 PROCEDURE — G8399 PT W/DXA RESULTS DOCUMENT: HCPCS | Performed by: NURSE PRACTITIONER

## 2020-04-21 RX ORDER — ROSUVASTATIN CALCIUM 10 MG/1
10 TABLET, COATED ORAL DAILY
COMMUNITY

## 2020-04-21 RX ORDER — LORAZEPAM 1 MG/1
1 TABLET ORAL 3 TIMES DAILY PRN
COMMUNITY

## 2020-04-21 NOTE — PROGRESS NOTES
Tremaine Rebolledo  1950    Chief Complaint   Patient presents with    Fever       Respiratory Symptoms:  Patient complains of 2 week(s) history of fever: 100.5-101.9, headache and body aches, fatigue, chills, sweating. Symptoms have been unchanged with time. She denies any other symptoms . She states that she has been on 4 different antibiotics in the past month, had sinus surgery and seems like it is from that. Relevant PMH: sinus surgery on 03/13/2020, currently on antiobiotics. Smoking history:  She  reports that she quit smoking about 6 years ago. Her smoking use included cigarettes. She has a 10.00 pack-year smoking history. She has never used smokeless tobacco.     She has had no known ill contacts. Treatment to date: Acetaminophen, antibiotic. Travel screen completed:  Yes          Past Medical History:   Diagnosis Date    Allergic rhinitis, Dr Jo Ann Peck 10/28/2013    Anxiety     Back pain     CAD (coronary artery disease)     Cat bite involving extremity 11/5/2013    Cat bite involving extremity 11/5/2013    Cellulitis 11/5/2013    Cellulitis, improving 11/5/2013    Cervical radiculitis 1/13/2014    Cervical radiculitis, surgery pending 1/13/2014    COPD (chronic obstructive pulmonary disease) ([address] 1/3/2017    CTS (carpal tunnel syndrome), Ortho 1/13/2014    Dyslipidemia 10/28/2013    Fatigue 10/7/2013    Headache(784.0)     H/O MIGRANES    Hernia, hiatal     H/O     Hx of cardiac cath x 2, normal, last 9/13 10/7/2013    Osteoarthritis     S/P cervical spinal fusion, 5-6 1/13/2014    Seizure disorder ([address]     Seizure disorder ([address]     Sinusitis 10/28/2013    Tobacco abuse, quit 9/3/13 10/7/2013    Tobacco abuse, quit 9/3/13      Past Surgical History:   Procedure Laterality Date    APPENDECTOMY      CARDIAC CATHETERIZATION  9/5/13    DR. MAGGIE Samuels    fusion    CHOLECYSTECTOMY      COLONOSCOPY  2009    COSMETIC SURGERY  2010    on Refill    LORazepam (ATIVAN) 1 MG tablet Take 1 mg by mouth every 8 hours as needed for Anxiety.  rosuvastatin (CRESTOR) 10 MG tablet Take 10 mg by mouth daily      furosemide (LASIX) 20 MG tablet Take 1 tablet by mouth daily 30 tablet 0    vitamin D (ERGOCALCIFEROL) 01231 units CAPS capsule Take 1 capsule by mouth once a week 12 capsule 0    aspirin 81 MG EC tablet Take 1 tablet by mouth 2 times daily 60 tablet 0    gabapentin (NEURONTIN) 300 MG capsule Take 1 capsule by mouth 3 times daily for 30 days. . 90 capsule 3    meloxicam (MOBIC) 7.5 MG tablet Take 1 tablet by mouth daily (Patient not taking: Reported on 4/21/2020) 30 tablet 3    loratadine (CLARITIN) 10 MG tablet Take 1 tablet by mouth daily (Patient not taking: Reported on 4/21/2020) 30 tablet 1    azelastine (ASTELIN) 0.1 % nasal spray 2 sprays by Nasal route 2 times daily Use in each nostril as directed (Patient not taking: Reported on 4/21/2020) 1 Bottle 3    cyanocobalamin ([employer] VITAMIN B12) 1000 MCG tablet Take 1 tablet by mouth daily (Patient not taking: Reported on 4/21/2020) 30 tablet 5     No current facility-administered medications on file prior to visit. Allergies:  Keflex [cephalexin]; Nickel; Simvastatin; and Ultram [tramadol hcl]    Review of Systems   Constitutional: Positive for chills, diaphoresis, fatigue and fever. Musculoskeletal: Positive for myalgias. Neurological: Positive for headaches. Objective:   /70 (Site: Left Upper Arm, Position: Sitting, Cuff Size: Medium Adult)   Pulse 60   Temp 97.2 °F (36.2 °C) (Oral)   Resp 16   Wt 155 lb (70.3 kg)   LMP  (LMP Unknown)   SpO2 98%   BMI 26.61 kg/m²     Physical Exam  Constitutional:       Appearance: Normal appearance. Cardiovascular:      Rate and Rhythm: Normal rate. Pulmonary:      Effort: Pulmonary effort is normal.   Neurological:      Mental Status: She is alert and oriented to person, place, and time.        Assessment:       Diagnosis

## 2020-04-23 LAB
SARS-COV-2: NOT DETECTED
SOURCE: NORMAL

## 2020-04-24 ENCOUNTER — CARE COORDINATION (OUTPATIENT)
Dept: CARE COORDINATION | Age: 70
End: 2020-04-24

## 2021-02-25 ENCOUNTER — OFFICE VISIT (OUTPATIENT)
Dept: UROLOGY | Age: 71
End: 2021-02-25
Payer: MEDICARE

## 2021-02-25 VITALS
SYSTOLIC BLOOD PRESSURE: 120 MMHG | HEIGHT: 64 IN | HEART RATE: 67 BPM | WEIGHT: 164 LBS | BODY MASS INDEX: 28 KG/M2 | DIASTOLIC BLOOD PRESSURE: 80 MMHG

## 2021-02-25 DIAGNOSIS — Z87.440 HISTORY OF RECURRENT UTIS: ICD-10-CM

## 2021-02-25 DIAGNOSIS — R31.29 MICROHEMATURIA: Primary | ICD-10-CM

## 2021-02-25 LAB
BILIRUBIN, POC: ABNORMAL
BLOOD URINE, POC: ABNORMAL
CLARITY, POC: CLEAR
COLOR, POC: YELLOW
GLUCOSE URINE, POC: ABNORMAL
KETONES, POC: ABNORMAL
LEUKOCYTE EST, POC: ABNORMAL
NITRITE, POC: ABNORMAL
PH, POC: 6
PROTEIN, POC: ABNORMAL
SPECIFIC GRAVITY, POC: 1.01
UROBILINOGEN, POC: 0.2

## 2021-02-25 PROCEDURE — 1036F TOBACCO NON-USER: CPT | Performed by: UROLOGY

## 2021-02-25 PROCEDURE — 4040F PNEUMOC VAC/ADMIN/RCVD: CPT | Performed by: UROLOGY

## 2021-02-25 PROCEDURE — G8484 FLU IMMUNIZE NO ADMIN: HCPCS | Performed by: UROLOGY

## 2021-02-25 PROCEDURE — 1123F ACP DISCUSS/DSCN MKR DOCD: CPT | Performed by: UROLOGY

## 2021-02-25 PROCEDURE — G8427 DOCREV CUR MEDS BY ELIG CLIN: HCPCS | Performed by: UROLOGY

## 2021-02-25 PROCEDURE — 81003 URINALYSIS AUTO W/O SCOPE: CPT | Performed by: UROLOGY

## 2021-02-25 PROCEDURE — 1090F PRES/ABSN URINE INCON ASSESS: CPT | Performed by: UROLOGY

## 2021-02-25 PROCEDURE — 99204 OFFICE O/P NEW MOD 45 MIN: CPT | Performed by: UROLOGY

## 2021-02-25 PROCEDURE — 3017F COLORECTAL CA SCREEN DOC REV: CPT | Performed by: UROLOGY

## 2021-02-25 PROCEDURE — G8399 PT W/DXA RESULTS DOCUMENT: HCPCS | Performed by: UROLOGY

## 2021-02-25 PROCEDURE — G8417 CALC BMI ABV UP PARAM F/U: HCPCS | Performed by: UROLOGY

## 2021-02-25 RX ORDER — ACETAMINOPHEN 160 MG
TABLET,DISINTEGRATING ORAL
COMMUNITY

## 2021-02-25 RX ORDER — FLUCONAZOLE 150 MG/1
150 TABLET ORAL ONCE
Qty: 1 TABLET | Refills: 0 | Status: SHIPPED | OUTPATIENT
Start: 2021-02-25 | End: 2021-02-25

## 2021-02-25 RX ORDER — NITROGLYCERIN 0.4 MG/1
0.4 TABLET SUBLINGUAL EVERY 5 MIN PRN
COMMUNITY

## 2021-02-25 ASSESSMENT — ENCOUNTER SYMPTOMS
DIARRHEA: 0
SHORTNESS OF BREATH: 0
ABDOMINAL PAIN: 0
BACK PAIN: 1
ABDOMINAL DISTENTION: 1
RESPIRATORY NEGATIVE: 1
VOMITING: 0
NAUSEA: 0

## 2021-02-25 NOTE — PROGRESS NOTES
Subjective:      Patient ID: Florencia Hunt is a 72 y.o. female. HPI  This is a 71 yo female with h/o intestinal ischemia s/p stent placed by vascular at [facility] in 2020, CAD, Cervical and Lumbar DDD, OA, Myalgia and here for evaluation of recurrent UTI's. She reports that since her stent placement last year, she has had recurrent UTI's treated with oral antibiotics by her PCP. Her symptoms include dysuria and some frequency and mild urgency. She has no gross hematuria or abd/flank pain. She has no F/C or N/V. She also now has vaginal inflammation and constant burning. She just finished a course of Augmentin for a \"sinus infection\". Her last C/S on 2/17/21 was negative. She has mild GINO at baseline but no UI. She has a fair flow. She has no prior h/o UTI's or stones. She has no family h/o  malignancies. She still has her uterus and ovaries and has a GYN at [facility]. She quit ciggs 8 yrs ago and uses [place] currently. She smoked < 1 ppd for 30 yrs prior. She has no other complaints. She was followed in the past by me for a benign renal cyst. She sees pain management for her DDD. She did notice some blood on TP after voiding recently due to vaginal irritation. She has had IV contrast recently in 2019 and 2014 without problems as long as not iodine based.      Past Medical History:   Diagnosis Date    Allergic rhinitis, Dr Tj Stoddard 10/28/2013    Anxiety     Back pain     CAD (coronary artery disease)     Cat bite involving extremity 11/5/2013    Cat bite involving extremity 11/5/2013    Cellulitis 11/5/2013    Cellulitis, improving 11/5/2013    Cervical radiculitis 1/13/2014    Cervical radiculitis, surgery pending 1/13/2014    COPD (chronic obstructive pulmonary disease) [address] [facility] 1/3/2017    CTS (carpal tunnel syndrome), Ortho 1/13/2014    Dyslipidemia 10/28/2013    Fatigue 10/7/2013    Headache(784.0)     H/O MIGRANES    Hernia, hiatal     H/O     Hx of cardiac cath x 2, normal, last 9/13 10/7/2013  Osteoarthritis     S/P cervical spinal fusion, 5-6 2014    [facility]     Seizure disorder ([facility]     Sinusitis 10/28/2013    Tobacco abuse, quit 9/3/13 10/7/2013    Tobacco abuse, quit 9/3/13      Past Surgical History:   Procedure Laterality Date    APPENDECTOMY      CARDIAC CATHETERIZATION  13    DR. MAGGIE Lizarraga Arvind    fusion    CHOLECYSTECTOMY      COLONOSCOPY  2009    COSMETIC SURGERY  2010    on eye    GA TOTAL KNEE ARTHROPLASTY Left 2017    LEFT TOTAL KNEE ARTHROPLASTY SUPINE BELEN PSI SPINAL (OUTSIDE PAT DR. OLIVA) performed by Rowdy Sagastume MD at [facility] History     Socioeconomic History    Marital status: Single     Spouse name: None    Number of children: None    Years of education: None    Highest education level: None   Occupational History    Occupation: Homemaker   Social Needs    Financial resource strain: None    Food insecurity     Worry: None     Inability: None    Transportation needs     Medical: None     Non-medical: None   Tobacco Use    Smoking status: Former Smoker     Packs/day: 0.50     Years: 20.00     Pack years: 10.00     Types: Cigarettes     Quit date: 2013     Years since quittin.4    Smokeless tobacco: Never Used   Substance and Sexual Activity    Alcohol use: No    Drug use: None    Sexual activity: None   Lifestyle    Physical activity     Days per week: None     Minutes per session: None    Stress: None   Relationships    Social connections     Talks on phone: None     Gets together: None     Attends [employer] service: None     Active member of club or organization: None     Attends meetings of clubs or organizations: None     Relationship status: None    Intimate partner violence     Fear of current or ex partner: None     Emotionally abused: None     Physically abused: None     Forced sexual activity: None   Other Topics Concern    None Social History Narrative    Social/Functional History          Family History   Problem Relation Age of Onset    Heart Disease Father         MI    High Blood Pressure Father     Depression Mother      Current Outpatient Medications   Medication Sig Dispense Refill    nitroGLYCERIN (NITROSTAT) 0.4 MG SL tablet Place 0.4 mg under the tongue every 5 minutes as needed for Chest pain up to max of 3 total doses. If no relief after 1 dose, call 911.  Probiotic Product (PROBIOTIC ADVANCED PO) Take by mouth      sodium chloride (OCEAN, BABY AYR) 0.65 % nasal spray 1 spray by Nasal route as needed for Congestion      Cholecalciferol (VITAMIN D3) 50 MCG (2000 UT) CAPS Take by mouth      fluconazole (DIFLUCAN) 150 MG tablet Take 1 tablet by mouth once for 1 dose 1 tablet 0    LORazepam (ATIVAN) 1 MG tablet Take 1 mg by mouth 3 times daily as needed for Anxiety.  rosuvastatin (CRESTOR) 10 MG tablet Take 10 mg by mouth daily      furosemide (LASIX) 20 MG tablet Take 1 tablet by mouth daily 30 tablet 0    loratadine (CLARITIN) 10 MG tablet Take 1 tablet by mouth daily 30 tablet 1    cyanocobalamin (CVS VITAMIN B12) 1000 MCG tablet Take 1 tablet by mouth daily 30 tablet 5    aspirin 81 MG EC tablet Take 1 tablet by mouth 2 times daily 60 tablet 0     No current facility-administered medications for this visit. Doxycycline, Keflex [cephalexin], Lidocaine, Nickel, Prednisone, Shellfish-derived products, Simvastatin, Ultram [tramadol hcl], and Augmentin [amoxicillin-pot clavulanate]  reviewed      Review of Systems   Constitutional: Negative. HENT: Negative. Eyes: Positive for visual disturbance. Respiratory: Negative. Negative for shortness of breath. Cardiovascular: Negative. Negative for chest pain. Gastrointestinal: Positive for abdominal distention. Negative for abdominal pain, diarrhea, nausea and vomiting. Endocrine: Negative. Genitourinary: Positive for dysuria and vaginal pain. Negative for decreased urine volume, difficulty urinating, enuresis, flank pain, pelvic pain, urgency and vaginal bleeding. Musculoskeletal: Positive for back pain. Skin: Negative. Neurological: Negative. Hematological: Does not bruise/bleed easily. Psychiatric/Behavioral: Negative. Objective:   Physical Exam  Exam conducted with a chaperone present. Constitutional:       Appearance: Normal appearance. HENT:      Head: Normocephalic and atraumatic. Eyes:      Conjunctiva/sclera: Conjunctivae normal.   Neck:      Musculoskeletal: Neck supple. No muscular tenderness. Cardiovascular:      Rate and Rhythm: Normal rate and regular rhythm. Heart sounds: Normal heart sounds. No murmur. Pulmonary:      Effort: Pulmonary effort is normal. No respiratory distress. Breath sounds: Normal breath sounds. No stridor. No wheezing, rhonchi or rales. Abdominal:      General: Abdomen is flat. Bowel sounds are normal. There is no distension. Palpations: Abdomen is soft. There is no mass. Tenderness: There is no abdominal tenderness. There is no right CVA tenderness, left CVA tenderness, guarding or rebound. Hernia: No hernia is present. There is no hernia in the left inguinal area or right inguinal area. Genitourinary:     Exam position: Knee-chest position. Labia:         Right: No rash, tenderness, lesion or injury. Left: No rash, tenderness, lesion or injury. Urethra: No prolapse, urethral pain, urethral swelling or urethral lesion. Vagina: No signs of injury and foreign body. Erythema and tenderness present. No vaginal discharge, bleeding, lesions or prolapsed vaginal walls. Cervix: No cervical motion tenderness. Uterus: Not tender. Adnexa:         Right: No tenderness. Left: No tenderness. Rectum: No external hemorrhoid. Comments: There was no significant cystocele or rectocele  Neurological:      Mental Status: She is alert. Psychiatric:         Mood and Affect: Mood normal.         Behavior: Behavior normal.     CT Abdomen Pelvis W Contrast  Status: Final result   Order Providers    Authorizing Encounter   DO Usman Dela Cruz DO          Signed by    Signed Date/Time Phone Pager   RESULT, UNKNOWN PROVIDER      Reading Providers    Read Date Phone Pager   RESULT, UNKNOWN PROVIDER      All Reviewers List    CUCA Mcgovern NP on 1/12/2015 14:51   CUCA Mcgovern NP on 1/12/2015 14:51   Radiation Dose Estimates    No radiation information found for this patient   Narrative   EXAMINATION CT ABDOMEN AND PELVIS WITH CONTRAST        CLINICAL INFORMATION LEFT FLANK PAIN.        TECHNIQUE Spiral scans with oral contrast and intravenous   administration of 100 cc of Optiray-320.       COMPARISON None available.       FINDINGS Limited scans of the lower thorax show minimal emphysematous   changes and bibasilar subpleural atelectasis. There are prominent   epicardial fat pads.       The liver and spleen are unremarkable. The gallbladder is within normal   limits. There is no biliary dilatation. The pancreas is unremarkable.       The adrenal glands are normal.       There are bilateral prominent extrarenal pelves. There is symmetric   bilateral renal contrast excretion without obstructive uropathy. There   is a subcentimeter right renal midpole cyst. There is a 2 mm left renal   upper pole cyst. There is no definite urolithiasis. Urinary bladder is   unremarkable. The uterus and adnexa are nonspecific.       There are atherosclerotic calcifications of the abdominal aorta. There   is no abdominal aortic aneurysm. The maximal transverse diameter of the   infrarenal abdominal aorta is 2.1 cm. There is no retroperitoneal   adenopathy, hemorrhage, or fibrosis.      This is a 71 yo female with h/o intestinal ischemia s/p stent placed by vascular at [facility] in 2020, CAD, Cervical and Lumbar DDD, OA, Myalgia and with reported recurrent UTI's but the last 3 urine C/S showed no specific growth and she still has symptoms with microhematuria and prior h/o cigg smoking. She may have a vaginal yeast infection given complaints, recent use of antibiotics and exam. I recommend Diflucan and I also recommend a full hematuria evaluation as she reports that each U/A she has had was told there was microhematuria. The risks and benefits of cystoscopy including but not limited to infection, pain, bleeding, stricture, retention, perforation, need for multiple procedures and she wants to proceed as planned. There is no evidence for UTI by U/A today. Plan:      1. CT Urogram  2. Flexible cystoscopy in office in 1-2 weeks  3.    Orders Placed This Encounter   Medications    fluconazole (DIFLUCAN) 150 MG tablet     Sig: Take 1 tablet by mouth once for 1 dose     Dispense:  1 tablet     Refill:  0             Chey Daniel MD

## 2021-03-04 ENCOUNTER — HOSPITAL ENCOUNTER (OUTPATIENT)
Dept: CT IMAGING | Age: 71
Discharge: HOME | End: 2021-03-06
Payer: MEDICARE

## 2021-03-04 ENCOUNTER — TELEPHONE (OUTPATIENT)
Dept: UROLOGY | Age: 71
End: 2021-03-04

## 2021-03-04 VITALS — BODY MASS INDEX: 28 KG/M2 | WEIGHT: 164 LBS | HEIGHT: 64 IN

## 2021-03-04 DIAGNOSIS — R31.29 MICROHEMATURIA: ICD-10-CM

## 2021-03-04 PROCEDURE — 2580000003 HC RX 258: Performed by: UROLOGY

## 2021-03-04 PROCEDURE — 74178 CT ABD&PLV WO CNTR FLWD CNTR: CPT

## 2021-03-04 PROCEDURE — 6360000004 HC RX CONTRAST MEDICATION: Performed by: UROLOGY

## 2021-03-04 RX ORDER — SODIUM CHLORIDE 0.9 % (FLUSH) 0.9 %
10 SYRINGE (ML) INJECTION
Status: COMPLETED | OUTPATIENT
Start: 2021-03-04 | End: 2021-03-04

## 2021-03-04 RX ORDER — TOPIRAMATE 25 MG/1
25 TABLET ORAL 2 TIMES DAILY
COMMUNITY

## 2021-03-04 RX ADMIN — SODIUM CHLORIDE, PRESERVATIVE FREE 10 ML: 5 INJECTION INTRAVENOUS at 14:04

## 2021-03-04 RX ADMIN — IOPAMIDOL 100 ML: 612 INJECTION, SOLUTION INTRAVENOUS at 14:03

## 2021-03-04 NOTE — TELEPHONE ENCOUNTER
Pt was prescribed fluconazole (DIFLUCAN) 150 MG tablet on 2/25/21, and states that it hasn't helped her with her symptoms. She was told to call back for a different medication if it didn't work. JANAE Pittman.

## 2021-03-05 LAB
GFR AFRICAN AMERICAN: >60
GFR NON-AFRICAN AMERICAN: >60
PERFORMED ON: NORMAL
POC CREATININE: 0.8 MG/DL (ref 0.6–1.2)
POC SAMPLE TYPE: NORMAL

## 2021-03-09 ENCOUNTER — PROCEDURE VISIT (OUTPATIENT)
Dept: UROLOGY | Age: 71
End: 2021-03-09
Payer: MEDICARE

## 2021-03-09 VITALS
HEART RATE: 67 BPM | HEIGHT: 64 IN | DIASTOLIC BLOOD PRESSURE: 76 MMHG | SYSTOLIC BLOOD PRESSURE: 102 MMHG | WEIGHT: 164 LBS | BODY MASS INDEX: 28 KG/M2

## 2021-03-09 DIAGNOSIS — R31.29 MICROHEMATURIA: Primary | ICD-10-CM

## 2021-03-09 LAB
BILIRUBIN, POC: ABNORMAL
BLOOD URINE, POC: ABNORMAL
CLARITY, POC: CLEAR
COLOR, POC: YELLOW
GLUCOSE URINE, POC: ABNORMAL
KETONES, POC: ABNORMAL
LEUKOCYTE EST, POC: ABNORMAL
NITRITE, POC: ABNORMAL
PH, POC: 7
PROTEIN, POC: ABNORMAL
SPECIFIC GRAVITY, POC: 1.02
UROBILINOGEN, POC: 1

## 2021-03-09 PROCEDURE — 52000 CYSTOURETHROSCOPY: CPT | Performed by: UROLOGY

## 2021-03-09 PROCEDURE — 81003 URINALYSIS AUTO W/O SCOPE: CPT | Performed by: UROLOGY

## 2021-03-09 RX ORDER — SULFAMETHOXAZOLE AND TRIMETHOPRIM 800; 160 MG/1; MG/1
1 TABLET ORAL ONCE
Qty: 1 TABLET | Refills: 0 | COMMUNITY
Start: 2021-03-09 | End: 2021-03-09

## 2021-03-09 ASSESSMENT — ENCOUNTER SYMPTOMS: ABDOMINAL PAIN: 0

## 2021-03-09 NOTE — PROGRESS NOTES
Time Out was called before Cystoscopy procedure. Patient is Hans Leyva,  is 1950. Patient has the following allergies: Allergies   Allergen Reactions    Doxycycline     Keflex [Cephalexin] Nausea Only    Lidocaine     Morphine And Related     Nickel     Prednisone      Other reaction(s): Other: See Comments  Pt. had chest pain and red facial rash      Shellfish-Derived Products     Simvastatin      Other reaction(s): Unknown    Tetracyclines & Related     Ultram [Tramadol Hcl]     Augmentin [Amoxicillin-Pot Clavulanate] Nausea And Vomiting   . Patient was given Bactrim DS antibiotic before the procedure.

## 2021-03-09 NOTE — PROGRESS NOTES
Subjective:      Patient ID: Kati Donato is a 72 y.o. female. HPI  This is a 73 yo female with h/o intestinal ischemia s/p stent placed by vascular at [facility] in 2020, CAD, Cervical and Lumbar DDD, OA, Myalgia and with reported recurrent UTI's (last 3 urine C/S showed no specific growth) and back with microhematuria and prior h/o cigg smoking. Since last seen on 2/25/21, she is having some vaginal discharge and used Monistat for a few days but stopped prior to this procedure and will complete a full 7 days. I reviewed the interval CT Urogram results with the patient and on PACS and there are small benign appearing renal cysts but no stones or hydronephrosis. She wants to proceed with cystoscopy today. She has no gross hematuria or specific voiding complaints. Past Medical History:   Diagnosis Date    Allergic rhinitis, Dr Darwin Lama 10/28/2013    Anxiety     Back pain     CAD (coronary artery disease)     Cat bite involving extremity 11/5/2013    Cat bite involving extremity 11/5/2013    Cellulitis 11/5/2013    Cellulitis, improving 11/5/2013    Cervical radiculitis 1/13/2014    Cervical radiculitis, surgery pending 1/13/2014    COPD (chronic obstructive pulmonary disease) ([address] 1/3/2017    CTS (carpal tunnel syndrome), Ortho 1/13/2014    Dyslipidemia 10/28/2013    Fatigue 10/7/2013    Headache(784.0)     H/O MIGRANES    Hernia, hiatal     H/O     Hx of cardiac cath x 2, normal, last 9/13 10/7/2013    Osteoarthritis     S/P cervical spinal fusion, 5-6 1/13/2014    Seizure disorder ([address]     Seizure disorder ([address]     Sinusitis 10/28/2013    Tobacco abuse, quit 9/3/13 10/7/2013    Tobacco abuse, quit 9/3/13      Past Surgical History:   Procedure Laterality Date    APPENDECTOMY      CARDIAC CATHETERIZATION  9/5/13    DR. SERRANO   [id number] SURGERY  1995    fusion    CHOLECYSTECTOMY      COLONOSCOPY  2009    COSMETIC SURGERY  2010    on eye    MD TOTAL KNEE ARTHROPLASTY Left 2017    LEFT TOTAL KNEE ARTHROPLASTY SUPINE BELEN PSI SPINAL (OUTSIDE PAT DR. OLIVA) performed by Edouard Lundy MD at [facility] History     Socioeconomic History    Marital status: Single     Spouse name: None    Number of children: None    Years of education: None    Highest education level: None   Occupational History    Occupation: Homemaker   Social Needs    Financial resource strain: None    Food insecurity     Worry: None     Inability: None    Transportation needs     Medical: None     Non-medical: None   Tobacco Use    Smoking status: Former Smoker     Packs/day: 0.50     Years: 20.00     Pack years: 10.00     Types: Cigarettes     Quit date: 2013     Years since quittin.5    Smokeless tobacco: Never Used   Substance and Sexual Activity    Alcohol use: No    Drug use: None    Sexual activity: None   Lifestyle    Physical activity     Days per week: None     Minutes per session: None    Stress: None   Relationships    Social connections     Talks on phone: None     Gets together: None     Attends Oriental orthodox service: None     Active member of club or organization: None     Attends meetings of clubs or organizations: None     Relationship status: None    Intimate partner violence     Fear of current or ex partner: None     Emotionally abused: None     Physically abused: None     Forced sexual activity: None   Other Topics Concern    None   Social History Narrative    Social/Functional History          Family History   Problem Relation Age of Onset    Heart Disease Father         MI    High Blood Pressure Father     Depression Mother      Current Outpatient Medications   Medication Sig Dispense Refill    sulfamethoxazole-trimethoprim (BACTRIM DS) 800-160 MG per tablet Take 1 tablet by mouth once for 1 dose 1 tablet 0    topiramate (TOPAMAX) 25 MG tablet Take 25 mg by mouth 2 times daily      nitroGLYCERIN (NITROSTAT) 0.4 MG SL tablet Place 0.4 mg under the tongue every 5 minutes as needed for Chest pain up to max of 3 total doses. If no relief after 1 dose, call 911.  Probiotic Product (PROBIOTIC ADVANCED PO) Take by mouth      sodium chloride (OCEAN, BABY AYR) 0.65 % nasal spray 1 spray by Nasal route as needed for Congestion      Cholecalciferol (VITAMIN D3) 50 MCG (2000 UT) CAPS Take by mouth      LORazepam (ATIVAN) 1 MG tablet Take 1 mg by mouth 3 times daily as needed for Anxiety.  rosuvastatin (CRESTOR) 10 MG tablet Take 10 mg by mouth daily      loratadine (CLARITIN) 10 MG tablet Take 1 tablet by mouth daily 30 tablet 1    cyanocobalamin ([employer] VITAMIN B12) 1000 MCG tablet Take 1 tablet by mouth daily 30 tablet 5    aspirin 81 MG EC tablet Take 1 tablet by mouth 2 times daily 60 tablet 0     No current facility-administered medications for this visit. Doxycycline, Keflex [cephalexin], Lidocaine, Morphine and related, Nickel, Prednisone, Shellfish-derived products, Simvastatin, Tetracyclines & related, Ultram [tramadol hcl], and Augmentin [amoxicillin-pot clavulanate]  reviewed      Review of Systems   Gastrointestinal: Negative for abdominal pain. Genitourinary: Negative for flank pain. Objective:   Physical Exam  Constitutional:       Appearance: Normal appearance. Abdominal:      General: There is no distension. Palpations: Abdomen is soft. Neurological:      Mental Status: She is alert.    Psychiatric:         Mood and Affect: Mood normal.         CT Urogram  Status: Final result   Order Providers    Authorizing Encounter Billing   Deanna Ramirez MD [place] [facility] ROOM 1 Sincere Valencia MD          Signed by    Signed Date/Time Phone Pager   Calista Johnson 3/04/2021  5:20 [phone]    Reading Providers    Read Date Phone Pager   Rosa Kohler Mar 4, 2021  5:20 [phone]    All Reviewers List    Deanna Ramirez MD on 3/5/2021 09:20   Routing History    Priority Sent On From To Message Type    3/5/2021  9:16 AM Sandip, Chpo Incoming Lab Results From Kate Galeana MD CC'd Results    3/4/2021  5:23 PM Sandip, Chpo Incoming Radiant Results From Caroline Herrera MD Results   Radiation Dose Estimates    No radiation information found for this patient   Narrative   CT of the Abdomen and Pelvis with and without intravenous contrast medium.       History:  Microhematuria. Dysuria.       Technical Factors:       CT urogram of the abdomen and pelvis were obtained and formatted as 2.5 mm contiguous axial images from the domes of the diaphragm to the symphysis pubis.  Sagittal and coronal reconstructions were also obtained.       Oral contrast medium: None. Intravenous contrast medium:  Isovue-300, 100 mL.       Comparison:  CT abdomen pelvis, September 15, 2014.       Findings:       Lungs:  Lung bases are clear. Small hiatal hernia.       Liver:  Normal in size, shape, and attenuation.         Bile Ducts:  Mild to moderate intrahepatic ductal dilatation. Common duct measures 11 mm at level portal vein and right hepatic artery. Common duct remains dilated gently tapers as it courses inferiorly to the ampulla of Vater. No intraluminal filling    defects identified.       Gallbladder:  No stones or wall thickening.        Pancreas:  Normal without masses, cysts, ductal dilatation or calcification.        Spleen:  Normal in size without masses or calcifications.  No splenules.        Kidneys:  Normal in size and enhancement.  No hydronephrosis, or stones. Type I cysts identified bilaterally, largest on right measuring 11 mm found anteriorly in midpole, with largest on left measuring 7 mm at apex upper pole.       Adrenals:  Normal.        Small bowel:  Normal in caliber.        Appendix:  Surgically absent.       Colon:  Normal in caliber.  Diverticular change, sigmoid colon.       Peritoneum:  No ascites, free air, or fluid collections.        Vessels:  Aorta normal in course and caliber.  Patent vascular stent identified at ostium of superior mesenteric artery (series 601, image 73). Portal vein, splenic vein, superior mesenteric vein are patent.        Lymph nodes:         Retroperitoneal:  No enlarged retroperitoneal lymph nodes. Mesenteric:  No enlarged mesenteric lymph nodes. Pelvic: No enlarged pelvic lymph nodes.        Ureters: Normal in course and caliber. No calcifications.        Bladder: Partially decompressed. No contrast medium identified within urinary bladder.       Reproductive organs: No pelvic masses.        Abdominal Wall:  No hernia identified.     No diastasis of rectus musculature.      No edema or masses.        Bones:  No bone lesions. Diffuse disc space narrowing L5-S1 and L1-L2. No post operative changes.            Impression       No renal/ureteral calculi.  No hydronephrosis/hydroureter.       No contrast medium identified within caudal ureters or urinary bladder on delayed imaging.       Sigmoid diverticulosis.       Intrahepatic and extrahepatic ductal dilatation as discussed in post cholecystectomy patient.       Vascular stent, ostium, superior mesenteric artery.       Appendectomy.       Other findings discussed.           All CT scans at this facility use dose modulation, iterative reconstruction, and/or weight based dosing when appropriate to reduce radiation dose to as low as reasonably achievable.           3/9/2021  2:00 PM - Benjamen Pen Jourdan, CMA    Component Collected Lab   Color, UA 03/09/2021  2:00 PM Unknown   yellow    Clarity, UA 03/09/2021  2:00 PM Unknown   clear    Glucose, UA POC 03/09/2021  2:00 PM Unknown   neg    Bilirubin, UA 03/09/2021  2:00 PM Unknown   small    Ketones, UA 03/09/2021  2:00 PM Unknown   trace    Spec Grav, UA 03/09/2021  2:00 PM Unknown   1.020    Blood, UA POC 03/09/2021  2:00 PM Unknown   small    pH, UA 03/09/2021  2:00 PM Unknown   7.0    Protein, UA POC 03/09/2021  2:00 PM Unknown   30 mg/dL Urobilinogen, UA 03/09/2021  2:00 PM Unknown   1.0    Leukocytes, UA 03/09/2021  2:00 PM Unknown   large    Nitrite, UA 03/09/2021  2:00 PM Unknown   neg    Lab and Collection     Cystoscopy Procedure Note    Pre-operative Diagnosis: Microhematuria    Post-operative Diagnosis: Same plus benign findings    Surgeon: Kassandra Coles     Assistants: Ruben Gill. KESHIA Mcghee    Anesthesia: Local anesthesia topical 2% lidocaine gel    Procedure Details   The risks, benefits, complications, treatment options, and expected outcomes were discussed with the patient. The patient concurred with the proposed plan, giving informed consent. Cystoscopy was performed today under local anesthesia, using sterile technique. The patient was placed in the supine position, prepped and draped in the usual sterile fashion. A flexible cystoscope was used to inspect the entire bladder including retroflexion. Findings:   Urethra:normal  Bladder: Normal mucosa without bladder tumors, stones, clots or FB  Ureteral orifice(s) were normal . Ureteral orifice(s) were in the normal location. Specimens: None                 Complications:  None; patient tolerated the procedure well. Disposition: To home. Condition: stable    Assessment: This is a 71 yo female with h/o intestinal ischemia s/p stent placed by vascular at [facility] in 2020, CAD, Cervical and Lumbar DDD, OA, Myalgia and with reported recurrent UTI's but the last 3 urine C/S showed no specific growth and now with negative microhematuria evaluation including cystoscopy today. She still may have a vaginal yeast infection given complaints and will finish full course of Monistat and if not improved will see est GYN. Plan:      1.  F/U prn and needs to see GYN if symptoms do not resolve        Kassandra Coles MD

## 2021-12-13 ENCOUNTER — TELEPHONE (OUTPATIENT)
Dept: UROLOGY | Age: 71
End: 2021-12-13

## 2021-12-13 NOTE — TELEPHONE ENCOUNTER
----- Message from [place] sent at 12/13/2021  9:38 AM EST -----  Regarding: referral  Per pt, Dr. Lacho Danielson told her to make an appt with her OBGYN. Her insurance will only cover 2 visits, every 2 years. She will need a referral, in order to be seen. Can this be created and sent to [facility]?  Dr. So Quick: [phone]

## 2021-12-13 NOTE — TELEPHONE ENCOUNTER
Spoke with patient, faxing last office visit. Patient has Medicare primary, no insurance referral is needed.

## 2022-04-12 ENCOUNTER — HOSPITAL ENCOUNTER (EMERGENCY)
Age: 72
Discharge: HOME | End: 2022-04-12
Attending: STUDENT IN AN ORGANIZED HEALTH CARE EDUCATION/TRAINING PROGRAM
Payer: MEDICARE

## 2022-04-12 ENCOUNTER — APPOINTMENT (OUTPATIENT)
Dept: GENERAL RADIOLOGY | Age: 72
End: 2022-04-12
Payer: MEDICARE

## 2022-04-12 VITALS
OXYGEN SATURATION: 98 % | TEMPERATURE: 98.9 F | WEIGHT: 175 LBS | HEART RATE: 63 BPM | HEIGHT: 64 IN | BODY MASS INDEX: 29.88 KG/M2 | SYSTOLIC BLOOD PRESSURE: 113 MMHG | DIASTOLIC BLOOD PRESSURE: 76 MMHG | RESPIRATION RATE: 19 BRPM

## 2022-04-12 DIAGNOSIS — S73.034A CLOSED ANTERIOR DISLOCATION OF RIGHT HIP, INITIAL ENCOUNTER (HCC): Primary | ICD-10-CM

## 2022-04-12 PROCEDURE — 96375 TX/PRO/DX INJ NEW DRUG ADDON: CPT

## 2022-04-12 PROCEDURE — 96374 THER/PROPH/DIAG INJ IV PUSH: CPT

## 2022-04-12 PROCEDURE — 99285 EMERGENCY DEPT VISIT HI MDM: CPT

## 2022-04-12 PROCEDURE — 96376 TX/PRO/DX INJ SAME DRUG ADON: CPT

## 2022-04-12 PROCEDURE — 2500000003 HC RX 250 WO HCPCS: Performed by: STUDENT IN AN ORGANIZED HEALTH CARE EDUCATION/TRAINING PROGRAM

## 2022-04-12 PROCEDURE — 27250 TREAT HIP DISLOCATION: CPT

## 2022-04-12 PROCEDURE — 6360000002 HC RX W HCPCS: Performed by: STUDENT IN AN ORGANIZED HEALTH CARE EDUCATION/TRAINING PROGRAM

## 2022-04-12 PROCEDURE — 72170 X-RAY EXAM OF PELVIS: CPT

## 2022-04-12 PROCEDURE — 6830039001 HC L3 TRAUMA PRIORITY

## 2022-04-12 PROCEDURE — 73502 X-RAY EXAM HIP UNI 2-3 VIEWS: CPT

## 2022-04-12 PROCEDURE — 73552 X-RAY EXAM OF FEMUR 2/>: CPT

## 2022-04-12 RX ORDER — KETOROLAC TROMETHAMINE 30 MG/ML
30 INJECTION, SOLUTION INTRAMUSCULAR; INTRAVENOUS ONCE
Status: COMPLETED | OUTPATIENT
Start: 2022-04-12 | End: 2022-04-12

## 2022-04-12 RX ORDER — HYDROCODONE BITARTRATE AND ACETAMINOPHEN 5; 325 MG/1; MG/1
1 TABLET ORAL EVERY 6 HOURS PRN
Qty: 12 TABLET | Refills: 0 | Status: SHIPPED | OUTPATIENT
Start: 2022-04-12 | End: 2022-04-15

## 2022-04-12 RX ORDER — FENTANYL CITRATE 50 UG/ML
50 INJECTION, SOLUTION INTRAMUSCULAR; INTRAVENOUS ONCE
Status: COMPLETED | OUTPATIENT
Start: 2022-04-12 | End: 2022-04-12

## 2022-04-12 RX ORDER — ETOMIDATE 2 MG/ML
20 INJECTION INTRAVENOUS ONCE
Status: COMPLETED | OUTPATIENT
Start: 2022-04-12 | End: 2022-04-12

## 2022-04-12 RX ORDER — FENTANYL CITRATE 50 UG/ML
100 INJECTION, SOLUTION INTRAMUSCULAR; INTRAVENOUS ONCE
Status: COMPLETED | OUTPATIENT
Start: 2022-04-12 | End: 2022-04-12

## 2022-04-12 RX ADMIN — ETOMIDATE 20 MG: 2 INJECTION, SOLUTION INTRAVENOUS at 16:49

## 2022-04-12 RX ADMIN — FENTANYL CITRATE 100 MCG: 50 INJECTION INTRAMUSCULAR; INTRAVENOUS at 16:01

## 2022-04-12 RX ADMIN — KETOROLAC TROMETHAMINE 30 MG: 30 INJECTION, SOLUTION INTRAMUSCULAR at 14:55

## 2022-04-12 RX ADMIN — FENTANYL CITRATE 50 MCG: 50 INJECTION INTRAMUSCULAR; INTRAVENOUS at 13:45

## 2022-04-12 ASSESSMENT — PAIN DESCRIPTION - ORIENTATION
ORIENTATION: RIGHT
ORIENTATION: RIGHT

## 2022-04-12 ASSESSMENT — PAIN SCALES - GENERAL
PAINLEVEL_OUTOF10: 6
PAINLEVEL_OUTOF10: 6
PAINLEVEL_OUTOF10: 10
PAINLEVEL_OUTOF10: 10

## 2022-04-12 ASSESSMENT — PAIN DESCRIPTION - LOCATION
LOCATION: HIP
LOCATION: HIP

## 2022-04-12 ASSESSMENT — PAIN DESCRIPTION - DESCRIPTORS
DESCRIPTORS: STABBING
DESCRIPTORS: STABBING

## 2022-04-12 ASSESSMENT — PAIN DESCRIPTION - PAIN TYPE
TYPE: ACUTE PAIN
TYPE: ACUTE PAIN

## 2022-04-12 ASSESSMENT — PAIN DESCRIPTION - ONSET: ONSET: SUDDEN

## 2022-04-12 ASSESSMENT — PAIN - FUNCTIONAL ASSESSMENT: PAIN_FUNCTIONAL_ASSESSMENT: 0-10

## 2022-04-12 ASSESSMENT — ENCOUNTER SYMPTOMS
SHORTNESS OF BREATH: 0
VOMITING: 0

## 2022-04-12 ASSESSMENT — PAIN DESCRIPTION - FREQUENCY
FREQUENCY: CONTINUOUS
FREQUENCY: CONTINUOUS

## 2022-04-12 NOTE — ED TRIAGE NOTES
Pt reports to the ED via EMS with co right hip pain. Pt states she was in her flower garden when she bent over and felt a \"pop\" in her right hip. Upon assessment pt is a/ox4 resp even and unlabored, skin pwd, Pt has shortening of right leg, and is internally rotated. Pt states she has sharp pain 6-10. Pt denies fall, chest pain, loc, or neck pain. Pt given 4 mg of Zofran and 4 mg of morphine PTA by EMS.

## 2022-04-12 NOTE — ED PROVIDER NOTES
[address] [facility] [place], [place] ED  eMERGENCY dEPARTMENT eNCOUnter      Pt Name: Garrett Hashimoto  MRN: [id number]  [place] 1950  Date of evaluation: 4/12/2022  Provider: Mary Jane Apple, [address] [place] [address]       Chief Complaint   Patient presents with    Hip Pain         HISTORY OF PRESENT ILLNESS   (Location/Symptom, Timing/Onset,Context/Setting, Quality, Duration, Modifying Factors, Severity)  Note limiting factors. Garrett Hashimoto is a 80 y.o. female who presents to the emergency department with complaint of right hip pain. Patient states that she bent over and felt a pop. Patient is shortened externally rotated right lower extremity. Patient had total hip replacement done in August 2021 by Dr. Erika Narvaez. Patient denies any numbness or tingling. Movement causes severe pain. She denies fall or injury. The history is provided by the patient. NursingNotes were reviewed. REVIEW OF SYSTEMS    (2-9 systems for level 4, 10 or more for level 5)     Review of Systems   Unable to perform ROS: Acuity of condition   Constitutional: Negative for activity change, appetite change, chills, diaphoresis and fever. Respiratory: Negative for shortness of breath. Cardiovascular: Negative for chest pain. Gastrointestinal: Negative for vomiting. Musculoskeletal: Positive for arthralgias ( Right hip). Except as noted above the remainder of the review of systems was reviewed and negative.        PAST MEDICAL HISTORY     Past Medical History:   Diagnosis Date    Allergic rhinitis, Dr Nia Licona 10/28/2013    Anxiety     Back pain     CAD (coronary artery disease)     Cat bite involving extremity 11/5/2013    Cat bite involving extremity 11/5/2013    Cellulitis 11/5/2013    Cellulitis, improving 11/5/2013    Cervical radiculitis 1/13/2014    Cervical radiculitis, surgery pending 1/13/2014    COPD (chronic obstructive pulmonary disease) ([facility] [address] 1/3/2017    CTS (carpal tunnel syndrome), Ortho 1/13/2014    Dyslipidemia 10/28/2013    Fatigue 10/7/2013    Headache(784.0)     H/O MIGRANES    Hernia, hiatal     H/O     Hx of cardiac cath x 2, normal, last 9/13 10/7/2013    Osteoarthritis     S/P cervical spinal fusion, 5-6 1/13/2014    Seizure disorder ([address]     Seizure disorder ([facility] [address]     Sinusitis 10/28/2013    Tobacco abuse, quit 9/3/13 10/7/2013    Tobacco abuse, quit 9/3/13          SURGICALHISTORY       Past Surgical History:   Procedure Laterality Date    APPENDECTOMY      CARDIAC CATHETERIZATION  9/5/13    DR. MAGGIE Lizarraga Arvind    fusion    CHOLECYSTECTOMY      COLONOSCOPY  2009    COSMETIC SURGERY  2010    on eye    [place] TOTAL KNEE ARTHROPLASTY Left 12/5/2017    LEFT TOTAL KNEE ARTHROPLASTY SUPINE BELEN PSI SPINAL (OUTSIDE PAT DR. OLIVA) performed by Delora Klinefelter, MD at [address] [place] [address]       Discharge Medication List as of 4/12/2022  6:23 PM      CONTINUE these medications which have NOT CHANGED    Details   topiramate (TOPAMAX) 25 MG tablet Take 25 mg by mouth 2 times dailyHistorical Med      nitroGLYCERIN (NITROSTAT) 0.4 MG SL tablet Place 0.4 mg under the tongue every 5 minutes as needed for Chest pain up to max of 3 total doses. If no relief after 1 dose, call 911. Historical Med      Probiotic Product (PROBIOTIC ADVANCED PO) Take by mouthHistorical Med      sodium chloride (OCEAN, BABY AYR) 0.65 % nasal spray 1 spray by Nasal route as needed for CongestionHistorical Med      Cholecalciferol (VITAMIN D3) 50 MCG (2000 UT) CAPS Take by mouthHistorical Med      LORazepam (ATIVAN) 1 MG tablet Take 1 mg by mouth 3 times daily as needed for Anxiety.  Historical Med      rosuvastatin (CRESTOR) 10 MG tablet Take 10 mg by mouth dailyHistorical Med      loratadine (CLARITIN) 10 MG tablet Take 1 tablet by mouth daily, Disp-30 tablet, R-1Normal      cyanocobalamin (CVS VITAMIN B12) 1000 MCG tablet Take 1 tablet by mouth daily, Disp-30 tablet, R-5Normal      aspirin 81 MG EC tablet Take 1 tablet by mouth 2 times daily, Disp-60 tablet, R-0Normal             ALLERGIES     Doxycycline, Keflex [cephalexin], Lidocaine, Morphine and related, Nickel, Prednisone, Shellfish-derived products, Simvastatin, Tetracyclines & related, Ultram [tramadol hcl], and Augmentin [amoxicillin-pot clavulanate]    FAMILY HISTORY       Family History   Problem Relation Age of Onset    Heart Disease Father         MI    High Blood Pressure Father     Depression Mother           SOCIAL HISTORY       Social History     Socioeconomic History    Marital status: Single     Spouse name: Not on file    Number of children: Not on file    Years of education: Not on file    Highest education level: Not on file   Occupational History    Occupation: Homemaker   Tobacco Use    Smoking status: Former Smoker     Packs/day: 0.50     Years: 20.00     Pack years: 10.00     Types: Cigarettes     Quit date: 2013     Years since quittin.6    Smokeless tobacco: Never Used   Vaping Use    Vaping Use: Never used   Substance and Sexual Activity    Alcohol use: No    Drug use: Not on file    Sexual activity: Not on file   Other Topics Concern    Not on file   Social History Narrative    Social/Functional History          Social Determinants of Health     Financial Resource Strain:     Difficulty of Paying Living Expenses: Not on file   Food Insecurity:     Worried About Running Out of Food in the Last Year: Not on file    Allan of Food in the Last Year: Not on file   Transportation Needs:     Lack of Transportation (Medical): Not on file    Lack of Transportation (Non-Medical):  Not on file   Physical Activity:     Days of Exercise per Week: Not on file    Minutes of Exercise per Session: Not on file   Stress:     Feeling of Stress : Not on file   Social Connections:     Frequency of Communication with Friends and Family: Not on file    Frequency of Social Gatherings with Friends and Family: Not on file    Attends Restorationist Services: Not on file    Active Member of Clubs or Organizations: Not on file    Attends Club or Organization Meetings: Not on file    Marital Status: Not on file   Intimate Partner Violence:     Fear of Current or Ex-Partner: Not on file    Emotionally Abused: Not on file    Physically Abused: Not on file    Sexually Abused: Not on file   Housing Stability:     Unable to Pay for Housing in the Last Year: Not on file    Number of [place] in the Last Year: Not on file    Unstable Housing in the Last Year: Not on file       SCREENINGS    [place] Coma Scale  Eye Opening: Spontaneous  Best Verbal Response: Oriented  Best Motor Response: Obeys commands  Ej Coma Scale Score: 15 @FLOW([id number])@      PHYSICAL EXAM    (up to 7 for level 4, 8 or more for level 5)     ED Triage Vitals [04/12/22 1325]   BP Temp Temp Source Pulse Resp SpO2 Height Weight   124/73 98.9 °F (37.2 °C) Oral 68 18 95 % 5' 4\" (1.626 m) 175 lb (79.4 kg)       Physical Exam  Vitals and nursing note reviewed. Constitutional:       General: She is awake. She is in acute distress. Appearance: Normal appearance. She is well-developed and normal weight. She is not ill-appearing, toxic-appearing or diaphoretic. Comments: No photophobia. No phonophobia. HENT:      Head: Normocephalic and atraumatic. No Eugene's sign. Right Ear: External ear normal.      Left Ear: External ear normal.      Nose: Nose normal. No congestion or rhinorrhea. Mouth/Throat:      Mouth: Mucous membranes are moist.      Pharynx: Oropharynx is clear. No oropharyngeal exudate or posterior oropharyngeal erythema. Eyes:      General: No scleral icterus. Right eye: No foreign body or discharge. Left eye: No discharge. Extraocular Movements: Extraocular movements intact. Conjunctiva/sclera: Conjunctivae normal.      Left eye: No exudate. Pupils: Pupils are equal, round, and reactive to light. Neck:      Vascular: No JVD. Trachea: No tracheal deviation. Comments: No meningismus. Cardiovascular:      Rate and Rhythm: Normal rate and regular rhythm. Pulses: Normal pulses. Heart sounds: Normal heart sounds. Heart sounds not distant. No murmur heard. No friction rub. No gallop. Pulmonary:      Effort: Pulmonary effort is normal. No respiratory distress. Breath sounds: Normal breath sounds. No stridor. No wheezing, rhonchi or rales. Chest:      Chest wall: No tenderness. Abdominal:      General: Abdomen is flat. Bowel sounds are normal. There is no distension. Palpations: Abdomen is soft. There is no mass. Tenderness: There is no abdominal tenderness. There is no right CVA tenderness, left CVA tenderness, guarding or rebound. Hernia: No hernia is present. Musculoskeletal:         General: Tenderness present. No swelling or signs of injury. Cervical back: Normal range of motion and neck supple. No rigidity. Right hip: Deformity and bony tenderness present. Decreased range of motion. Lymphadenopathy:      Head:      Right side of head: No submental adenopathy. Left side of head: No submental adenopathy. Skin:     General: Skin is warm and dry. Capillary Refill: Capillary refill takes less than 2 seconds. Coloration: Skin is not jaundiced or pale. Findings: No bruising, erythema, lesion or rash. Neurological:      General: No focal deficit present. Mental Status: She is alert and oriented to person, place, and time. Mental status is at baseline. Cranial Nerves: No cranial nerve deficit. Sensory: No sensory deficit. Motor: No weakness. Coordination: Coordination normal.      Deep Tendon Reflexes: Reflexes are normal and symmetric. Psychiatric:         Mood and Affect: Mood normal.         Behavior: Behavior normal. Behavior is cooperative. Thought Content: Thought content normal.         Judgment: Judgment normal.         DIAGNOSTIC RESULTS     EKG: All EKG's are interpreted by the Emergency Department Physician who either signs or Co-signsthis chart in the absence of a cardiologist.        RADIOLOGY:   Reggie  such as CT, Ultrasound and MRI are read by the radiologist. Plain radiographic images are visualized and preliminarily interpreted by the emergency physician with the below findings:        Interpretation per the Radiologist below, if available at the time ofthis note:    XR PELVIS (1-2 VIEWS)   Final Result   SATISFACTORY REDUCTION      XR FEMUR RIGHT (MIN 2 VIEWS)   Final Result      Superolateral dislocation femoral component right hip arthroplasty. XR HIP 2-3 VW W PELVIS RIGHT   Final Result      Superolateral dislocation femoral component right hip arthroplasty. ED BEDSIDE ULTRASOUND:   Performed by ED Physician - none    LABS:  Labs Reviewed - No data to display    All other labs were within normal range or not returned as of this dictation. EMERGENCY DEPARTMENT COURSE and DIFFERENTIAL DIAGNOSIS/MDM:   Vitals:    Vitals:    04/12/22 1658 04/12/22 1708 04/12/22 1730 04/12/22 1800   BP: 138/69  125/71 113/76   Pulse: 68 67 64 63   Resp: 13 17 18 19   Temp:       TempSrc:       SpO2: 100% 100% 96% 98%   Weight:       Height:               MDM  Dislocation reduction was done. Dr. Wyatt Sommers was called and wants patient to have a hip abduction pillow. A knee immobilizer will be given to the patient for transport. Patient had successful reduction    CONSULTS:  None    PROCEDURES:  Unless otherwise noted below, none     Ortho Injury    Date/Time: 4/12/2022 4:49 PM  Performed by: Manny Arevalo DO  Authorized by: Manny Arevalo DO   Consent: Verbal consent obtained. Written consent obtained.   Consent given by: patient  Patient understanding: patient states understanding of the procedure being performed  Patient consent: the patient's understanding of the procedure matches consent given  Procedure consent: procedure consent matches procedure scheduled  Relevant documents: relevant documents present and verified  Test results: test results available and properly labeled  Site marked: the operative site was marked  Imaging studies: imaging studies available  Patient identity confirmed: verbally with patient  Time out: Immediately prior to procedure a \"time out\" was called to verify the correct patient, procedure, equipment, support staff and site/side marked as required. Injury location: hip  Location details: right hip  Injury type: dislocation  Dislocation type: anterior  Spontaneous dislocation: yes  Prosthesis: yes  Pre-procedure neurovascular assessment: neurovascularly intact  Pre-procedure distal perfusion: normal  Pre-procedure neurological function: normal  Pre-procedure range of motion: reduced    Anesthesia:  Local anesthesia used: no    Sedation:  Patient sedated: yes  Sedation type: moderate (conscious) sedation  Sedatives: etomidate  Analgesia: fentanyl  Sedation start date/time: 4/12/2022 4:49 PM  Sedation end date/time: 4/12/2022 4:57 PM    Manipulation performed: yes  Reduction method: abduction  Reduction successful: yes          FINAL IMPRESSION      1.  Closed anterior dislocation of right hip, initial encounter [facility])          DISPOSITION/PLAN   DISPOSITION Decision To Discharge 04/12/2022 06:36:27 PM      PATIENT REFERRED TO:  Sherice Escobar MD  [address]  [facility] [address] [phone]    Call in 1 day  Arrange a follow-up visit    [facility]) ED  [address] [facility]  [phone]  Go to   If symptoms worsen    [place] [phone]    Go to   As needed      DISCHARGE MEDICATIONS:  Discharge Medication List as of 4/12/2022  6:23 PM      START taking these medications    Details HYDROcodone-acetaminophen (NORCO) 5-325 MG per tablet Take 1 tablet by mouth every 6 hours as needed for Pain for up to 3 days. Intended supply: 3 days.  Take lowest dose possible to manage pain, Disp-12 tablet, R-0Print                (Please note that portions of this note were completed with a voice recognition program.  Efforts were made to edit the dictations but occasionally words are mis-transcribed.)    Mari Butler DO (electronically signed)  Attending Emergency Physician          Mari Butler DO  04/12/22 Marcos Bower 1481,   04/15/22 7740

## 2022-04-12 NOTE — ED NOTES
Called to Xray they stated that they will send someone as soon as they are able.       Keith Lara RN  04/12/22 0620

## 2022-07-20 ENCOUNTER — APPOINTMENT (OUTPATIENT)
Dept: GENERAL RADIOLOGY | Age: 72
End: 2022-07-20
Payer: MEDICARE

## 2022-07-20 ENCOUNTER — HOSPITAL ENCOUNTER (EMERGENCY)
Age: 72
Discharge: HOME | End: 2022-07-20
Attending: EMERGENCY MEDICINE
Payer: MEDICARE

## 2022-07-20 VITALS
WEIGHT: 170 LBS | HEART RATE: 53 BPM | SYSTOLIC BLOOD PRESSURE: 157 MMHG | OXYGEN SATURATION: 97 % | TEMPERATURE: 98.1 F | DIASTOLIC BLOOD PRESSURE: 90 MMHG | BODY MASS INDEX: 29.18 KG/M2 | RESPIRATION RATE: 11 BRPM

## 2022-07-20 DIAGNOSIS — S73.034A ANTERIOR DISLOCATION OF RIGHT HIP, INITIAL ENCOUNTER (HCC): Primary | ICD-10-CM

## 2022-07-20 LAB
ALBUMIN SERPL-MCNC: 4.1 G/DL (ref 3.5–4.6)
ALP BLD-CCNC: 76 U/L (ref 40–130)
ALT SERPL-CCNC: 13 U/L (ref 0–33)
ANION GAP SERPL CALCULATED.3IONS-SCNC: 13 MEQ/L (ref 9–15)
APTT: 27.9 SEC (ref 24.4–36.8)
AST SERPL-CCNC: 18 U/L (ref 0–35)
BASOPHILS ABSOLUTE: 0.1 K/UL (ref 0–0.2)
BASOPHILS RELATIVE PERCENT: 1.4 %
BILIRUB SERPL-MCNC: 0.3 MG/DL (ref 0.2–0.7)
BUN BLDV-MCNC: 14 MG/DL (ref 8–23)
CALCIUM SERPL-MCNC: 9.3 MG/DL (ref 8.5–9.9)
CHLORIDE BLD-SCNC: 103 MEQ/L (ref 95–107)
CO2: 23 MEQ/L (ref 20–31)
CREAT SERPL-MCNC: 0.7 MG/DL (ref 0.5–0.9)
EOSINOPHILS ABSOLUTE: 0.2 K/UL (ref 0–0.7)
EOSINOPHILS RELATIVE PERCENT: 3.3 %
GFR AFRICAN AMERICAN: >60
GFR NON-AFRICAN AMERICAN: >60
GLOBULIN: 2.4 G/DL (ref 2.3–3.5)
GLUCOSE BLD-MCNC: 108 MG/DL (ref 70–99)
HCT VFR BLD CALC: 36 % (ref 37–47)
HEMOGLOBIN: 11.8 G/DL (ref 12–16)
INR BLD: 1
LYMPHOCYTES ABSOLUTE: 2.1 K/UL (ref 1–4.8)
LYMPHOCYTES RELATIVE PERCENT: 40 %
MAGNESIUM: 1.7 MG/DL (ref 1.7–2.4)
MCH RBC QN AUTO: 28.6 PG (ref 27–31.3)
MCHC RBC AUTO-ENTMCNC: 32.7 % (ref 33–37)
MCV RBC AUTO: 87.5 FL (ref 82–100)
MONOCYTES ABSOLUTE: 0.4 K/UL (ref 0.2–0.8)
MONOCYTES RELATIVE PERCENT: 7.2 %
NEUTROPHILS ABSOLUTE: 2.5 K/UL (ref 1.4–6.5)
NEUTROPHILS RELATIVE PERCENT: 48.1 %
PDW BLD-RTO: 13.8 % (ref 11.5–14.5)
PLATELET # BLD: 206 K/UL (ref 130–400)
POTASSIUM SERPL-SCNC: 3.6 MEQ/L (ref 3.4–4.9)
PROTHROMBIN TIME: 13.3 SEC (ref 12.3–14.9)
RBC # BLD: 4.12 M/UL (ref 4.2–5.4)
SODIUM BLD-SCNC: 139 MEQ/L (ref 135–144)
TOTAL PROTEIN: 6.5 G/DL (ref 6.3–8)
TROPONIN: <0.01 NG/ML (ref 0–0.01)
WBC # BLD: 5.3 K/UL (ref 4.8–10.8)

## 2022-07-20 PROCEDURE — 99285 EMERGENCY DEPT VISIT HI MDM: CPT

## 2022-07-20 PROCEDURE — 96375 TX/PRO/DX INJ NEW DRUG ADDON: CPT

## 2022-07-20 PROCEDURE — 27265 TREAT HIP DISLOCATION: CPT

## 2022-07-20 PROCEDURE — 85610 PROTHROMBIN TIME: CPT

## 2022-07-20 PROCEDURE — 2580000003 HC RX 258: Performed by: EMERGENCY MEDICINE

## 2022-07-20 PROCEDURE — 2500000003 HC RX 250 WO HCPCS: Performed by: EMERGENCY MEDICINE

## 2022-07-20 PROCEDURE — 80053 COMPREHEN METABOLIC PANEL: CPT

## 2022-07-20 PROCEDURE — 6360000002 HC RX W HCPCS: Performed by: EMERGENCY MEDICINE

## 2022-07-20 PROCEDURE — 83735 ASSAY OF MAGNESIUM: CPT

## 2022-07-20 PROCEDURE — 6360000002 HC RX W HCPCS

## 2022-07-20 PROCEDURE — 85025 COMPLETE CBC W/AUTO DIFF WBC: CPT

## 2022-07-20 PROCEDURE — 84484 ASSAY OF TROPONIN QUANT: CPT

## 2022-07-20 PROCEDURE — 36415 COLL VENOUS BLD VENIPUNCTURE: CPT

## 2022-07-20 PROCEDURE — 73502 X-RAY EXAM HIP UNI 2-3 VIEWS: CPT

## 2022-07-20 PROCEDURE — 94761 N-INVAS EAR/PLS OXIMETRY MLT: CPT

## 2022-07-20 PROCEDURE — 6830039000 HC L3 TRAUMA ALERT

## 2022-07-20 PROCEDURE — 2700000000 HC OXYGEN THERAPY PER DAY

## 2022-07-20 PROCEDURE — 85730 THROMBOPLASTIN TIME PARTIAL: CPT

## 2022-07-20 PROCEDURE — 73501 X-RAY EXAM HIP UNI 1 VIEW: CPT

## 2022-07-20 PROCEDURE — 96374 THER/PROPH/DIAG INJ IV PUSH: CPT

## 2022-07-20 RX ORDER — HYDROCODONE BITARTRATE AND ACETAMINOPHEN 5; 325 MG/1; MG/1
1 TABLET ORAL EVERY 6 HOURS PRN
Qty: 10 TABLET | Refills: 0 | Status: SHIPPED | OUTPATIENT
Start: 2022-07-20 | End: 2022-07-23

## 2022-07-20 RX ORDER — KETAMINE HYDROCHLORIDE 10 MG/ML
2 INJECTION, SOLUTION INTRAMUSCULAR; INTRAVENOUS ONCE
Status: COMPLETED | OUTPATIENT
Start: 2022-07-20 | End: 2022-07-20

## 2022-07-20 RX ORDER — MIDAZOLAM HYDROCHLORIDE 2 MG/2ML
2 INJECTION, SOLUTION INTRAMUSCULAR; INTRAVENOUS ONCE
Status: COMPLETED | OUTPATIENT
Start: 2022-07-20 | End: 2022-07-20

## 2022-07-20 RX ORDER — MIDAZOLAM HYDROCHLORIDE 1 MG/ML
INJECTION INTRAMUSCULAR; INTRAVENOUS
Status: COMPLETED
Start: 2022-07-20 | End: 2022-07-20

## 2022-07-20 RX ORDER — 0.9 % SODIUM CHLORIDE 0.9 %
1000 INTRAVENOUS SOLUTION INTRAVENOUS ONCE
Status: COMPLETED | OUTPATIENT
Start: 2022-07-20 | End: 2022-07-20

## 2022-07-20 RX ORDER — PROPOFOL 10 MG/ML
80 INJECTION, EMULSION INTRAVENOUS ONCE
Status: COMPLETED | OUTPATIENT
Start: 2022-07-20 | End: 2022-07-20

## 2022-07-20 RX ORDER — ONDANSETRON 2 MG/ML
4 INJECTION INTRAMUSCULAR; INTRAVENOUS ONCE
Status: COMPLETED | OUTPATIENT
Start: 2022-07-20 | End: 2022-07-20

## 2022-07-20 RX ORDER — PROPOFOL 10 MG/ML
INJECTION, EMULSION INTRAVENOUS
Status: COMPLETED
Start: 2022-07-20 | End: 2022-07-20

## 2022-07-20 RX ADMIN — SODIUM CHLORIDE 1000 ML: 9 INJECTION, SOLUTION INTRAVENOUS at 09:21

## 2022-07-20 RX ADMIN — ONDANSETRON 4 MG: 2 INJECTION INTRAMUSCULAR; INTRAVENOUS at 09:22

## 2022-07-20 RX ADMIN — MIDAZOLAM 2 MG: 1 INJECTION, SOLUTION INTRAMUSCULAR; INTRAVENOUS at 12:25

## 2022-07-20 RX ADMIN — PROPOFOL 80 MG: 10 INJECTION, EMULSION INTRAVENOUS at 12:26

## 2022-07-20 RX ADMIN — HYDROMORPHONE HYDROCHLORIDE 1 MG: 1 INJECTION, SOLUTION INTRAMUSCULAR; INTRAVENOUS; SUBCUTANEOUS at 09:22

## 2022-07-20 RX ADMIN — MIDAZOLAM HYDROCHLORIDE 2 MG: 2 INJECTION, SOLUTION INTRAMUSCULAR; INTRAVENOUS at 12:25

## 2022-07-20 RX ADMIN — KETAMINE HYDROCHLORIDE 154.2 MG: 10 INJECTION, SOLUTION INTRAMUSCULAR; INTRAVENOUS at 10:14

## 2022-07-20 ASSESSMENT — PAIN DESCRIPTION - LOCATION
LOCATION: HIP
LOCATION: HIP

## 2022-07-20 ASSESSMENT — ENCOUNTER SYMPTOMS
ABDOMINAL PAIN: 0
BACK PAIN: 0
SORE THROAT: 0
COUGH: 0
DIARRHEA: 0
SHORTNESS OF BREATH: 0
NAUSEA: 0
VOMITING: 0

## 2022-07-20 ASSESSMENT — PAIN SCALES - GENERAL
PAINLEVEL_OUTOF10: 10
PAINLEVEL_OUTOF10: 10

## 2022-07-20 ASSESSMENT — PAIN DESCRIPTION - ORIENTATION: ORIENTATION: RIGHT

## 2022-07-20 NOTE — ED PROVIDER NOTES
[address] [facility] ED  eMERGENCYdEPARTMENT eNCOUnter      Pt Name: Ayush Shaw  MRN: [id number]  [place] 1950  Date of evaluation: 7/20/2022  Levy Macedo MD    CHIEF COMPLAINT           HPI  Ayush Shaw is a 80 y.o. female per chart review has a h/o COPD, migraines, CAD, OA, seizures, depression/anxiety presents to the ED with R hip pain. Pt notes just prior to arrival she bent over to feed her cat and then she felt a pop in her hip. Pt notes moderate, constant, throbbing, R hip pain. Pt denies hitting head, LOC, headache, cp, sob, ab pain, dysuria, diarrhea. Pt notes her hip was replaced 1 year ago. Pt has had a dislocation since then. ROS  Review of Systems   Constitutional:  Negative for activity change, chills and fever. HENT:  Negative for ear pain and sore throat. Eyes:  Negative for visual disturbance. Respiratory:  Negative for cough and shortness of breath. Cardiovascular:  Negative for chest pain, palpitations and leg swelling. Gastrointestinal:  Negative for abdominal pain, diarrhea, nausea and vomiting. Genitourinary:  Negative for dysuria. Musculoskeletal:  Negative for back pain. R hip pain   Skin:  Negative for rash. Neurological:  Negative for dizziness and weakness. Except as noted above the remainder of the review of systems was reviewed and negative.        PAST MEDICAL HISTORY     Past Medical History:   Diagnosis Date    Allergic rhinitis, Dr Ignacio Stone 10/28/2013    Anxiety     Back pain     CAD (coronary artery disease)     Cat bite involving extremity 11/5/2013    Cat bite involving extremity 11/5/2013    Cellulitis 11/5/2013    Cellulitis, improving 11/5/2013    Cervical radiculitis 1/13/2014    Cervical radiculitis, surgery pending 1/13/2014    COPD (chronic obstructive pulmonary disease) ([facility] [address] 1/3/2017    CTS (carpal tunnel syndrome), Ortho 1/13/2014    Dyslipidemia 10/28/2013    Fatigue 10/7/2013    Headache(784.0)     H/O MIGRANES    Hernia, hiatal     H/O     Hx of cardiac cath x 2, normal, last 9/13 10/7/2013    Osteoarthritis     S/P cervical spinal fusion, 5-6 1/13/2014    Seizure disorder ([facility] [address]     Seizure disorder ([facility] [address]     Sinusitis 10/28/2013    Tobacco abuse, quit 9/3/13 10/7/2013    Tobacco abuse, quit 9/3/13          SURGICAL HISTORY       Past Surgical History:   Procedure Laterality Date    APPENDECTOMY      CARDIAC CATHETERIZATION  9/5/13     [address]    fusion    CHOLECYSTECTOMY      COLONOSCOPY  2009    COSMETIC SURGERY  2010    on eye    [place] TOTAL KNEE ARTHROPLASTY Left 12/5/2017    LEFT TOTAL KNEE ARTHROPLASTY SUPINE BELEN PSI SPINAL (OUTSIDE PAT DR. OLIVA) performed by Armando Ramirez MD at [address]       Previous Medications    ASPIRIN 81 MG EC TABLET    Take 1 tablet by mouth 2 times daily    CHOLECALCIFEROL (VITAMIN D3) 50 MCG (2000 UT) CAPS    Take by mouth    CYANOCOBALAMIN (CVS VITAMIN B12) 1000 MCG TABLET    Take 1 tablet by mouth daily    LORATADINE (CLARITIN) 10 MG TABLET    Take 1 tablet by mouth daily    LORAZEPAM (ATIVAN) 1 MG TABLET    Take 1 mg by mouth 3 times daily as needed for Anxiety. NITROGLYCERIN (NITROSTAT) 0.4 MG SL TABLET    Place 0.4 mg under the tongue every 5 minutes as needed for Chest pain up to max of 3 total doses. If no relief after 1 dose, call 911.     PROBIOTIC PRODUCT (PROBIOTIC ADVANCED PO)    Take by mouth    ROSUVASTATIN (CRESTOR) 10 MG TABLET    Take 10 mg by mouth daily    SODIUM CHLORIDE (OCEAN, BABY AYR) 0.65 % NASAL SPRAY    1 spray by Nasal route as needed for Congestion    TOPIRAMATE (TOPAMAX) 25 MG TABLET    Take 25 mg by mouth 2 times daily       ALLERGIES     Doxycycline, Keflex [cephalexin], Lidocaine, Morphine and related, Nickel, Prednisone, Shellfish-derived products, Simvastatin, Tetracyclines & related, Ultram [tramadol hcl], and Augmentin [amoxicillin-pot clavulanate]    FAMILY HISTORY       Family History   Problem Relation Age of Onset    Heart Disease Father         MI    High Blood Pressure Father     Depression Mother           SOCIAL HISTORY       Social History     Socioeconomic History    Marital status: Single   Occupational History    Occupation: Homemaker   Tobacco Use    Smoking status: Former     Packs/day: 0.50     Years: 20.00     Pack years: 10.00     Types: Cigarettes     Quit date: 2013     Years since quittin.8    Smokeless tobacco: Never   Vaping Use    Vaping Use: Never used   Substance and Sexual Activity    Alcohol use: No   Social History Narrative    Social/Functional History              PHYSICAL EXAM       ED Triage Vitals   BP Temp Temp src Pulse Resp SpO2 Height Weight   -- -- -- -- -- -- -- --       Physical Exam  Vitals and nursing note reviewed. Constitutional:       Appearance: She is well-developed. HENT:      Head: Normocephalic. Right Ear: External ear normal.      Left Ear: External ear normal.   Eyes:      Conjunctiva/sclera: Conjunctivae normal.      Pupils: Pupils are equal, round, and reactive to light. Cardiovascular:      Rate and Rhythm: Normal rate and regular rhythm. Heart sounds: Normal heart sounds. Pulmonary:      Effort: Pulmonary effort is normal.      Breath sounds: Normal breath sounds. Abdominal:      General: Bowel sounds are normal. There is no distension. Palpations: Abdomen is soft. Tenderness: no abdominal tenderness   Musculoskeletal:         General: Normal range of motion. Cervical back: Normal range of motion and neck supple. Comments: +Tenderness to palpation in R hip. Reduced ROM due to pain. RLE shortened and internally rotated. 2+ R DP pulse. Skin:     General: Skin is warm and dry. Neurological:      Mental Status: She is alert and oriented to person, place, and time.    Psychiatric:         Mood and Affect: Mood normal.         MDM  79 yo female presents to the ED with R hip pain after bending over. Pt is afebrile, hemodynamically stable. Pt given 1 L NS, IV dilaudid, IV zofran in the ED. Lab unremarkable. XR of hip shows R hip prosthesis dislocation. Pt consented for a conscious sedation. Pt given IV ketamine, IV versed, IV propofol in the ED. Pt with successful reduction of R hip. Pt's R hip was reduced using traction/counter traction. Pt educated about the results. Pt's orthopedic surgeon called and informed as she previously had a dislocation. Pt given prescription for norco, given hip pain warning signs and will f/u with ortho. Conscious Sedation Procedure Note    NPO status:  3 hours    Indication: hip dislocation    Consent: I have discussed with the patient and/or the patient representative the indication, alternatives, and the possible risks and/or complications of the planned procedure and the anesthesia methods. The patient and/or patient representative appear to understand and agree to proceed. Physician Involvement: The attending physician was present and supervising this procedure. Pre-Sedation Documentation and Exam: I have personally completed a history, physical exam & review of systems for this patient (see notes). Airway Assessment: Mallampati Class III - (soft palate & base of uvula are visible)    Prior History of Anesthesia Complications: none    ASA Classification: Class 2 - A normal healthy patient with mild systemic disease    Sedation/ Anesthesia Plan: intravenous sedation    Medications Used: ketamine intravenously, propofol intravenously, and midazolam (Versed) 2.0 mg intravenously    Monitoring and Safety: The patient was placed on a cardiac monitor and vital signs, pulse oximetry and level of consciousness were continuously evaluated throughout the procedure. The patient was closely monitored until recovery from the medications was complete and the patient had returned to baseline status.  Respiratory therapy was on standby at all times during the procedure. (The following sections must be completed)  Post-Sedation Vital Signs: Vital signs were reviewed and were stable after the procedure (see flow sheet for vitals)            Post-Sedation Exam: Lungs: clear to auscultation bilaterally without crackles or wheezing and Cardiovascular: regular rate and rhythm, no murmurs rubs or gallops           Complications: none          FINAL IMPRESSION      1.  Anterior dislocation of right hip, initial encounter [facility])          DISPOSITION/PLAN   DISPOSITION Decision To Discharge 07/20/2022 11:24:44 AM        DISCHARGE MEDICATIONS:  [unfilled]         Suhas Loyd MD(electronically signed)  Attending Emergency Physician            Suhas Loyd MD  07/20/22 9800

## 2022-07-20 NOTE — ED NOTES
Pt /o x4 skin w d intact pt ststes she is still feeling the effects of sedation and is unable to ambulate yet      Christiano Paredes RN  07/20/22 1446

## 2022-07-20 NOTE — ED NOTES
Dr Lorin Soares [facility] and dr sawyer at [facility]   Rt at bedside jurgen rn and merly rn  At [place] [id number], RN  07/20/22 1024

## 2022-07-27 ENCOUNTER — HOSPITAL ENCOUNTER (OUTPATIENT)
Dept: PREADMISSION TESTING | Age: 72
Discharge: HOME | DRG: 468 | End: 2022-07-31
Payer: MEDICARE

## 2022-07-27 VITALS
SYSTOLIC BLOOD PRESSURE: 112 MMHG | TEMPERATURE: 98 F | HEART RATE: 68 BPM | WEIGHT: 174.4 LBS | RESPIRATION RATE: 16 BRPM | HEIGHT: 64 IN | BODY MASS INDEX: 29.78 KG/M2 | DIASTOLIC BLOOD PRESSURE: 68 MMHG | OXYGEN SATURATION: 98 %

## 2022-07-27 LAB
ABO/RH: NORMAL
ANTIBODY SCREEN: NORMAL
BACTERIA: NEGATIVE /HPF
BILIRUBIN URINE: NEGATIVE
BLOOD, URINE: NEGATIVE
CLARITY: CLEAR
COLOR: YELLOW
EPITHELIAL CELLS, UA: NORMAL /HPF (ref 0–5)
GLUCOSE URINE: NEGATIVE MG/DL
HYALINE CASTS: NORMAL /HPF (ref 0–5)
KETONES, URINE: ABNORMAL MG/DL
LEUKOCYTE ESTERASE, URINE: ABNORMAL
NITRITE, URINE: NEGATIVE
PH UA: 5 (ref 5–9)
PROTEIN UA: NEGATIVE MG/DL
RBC UA: NORMAL /HPF (ref 0–5)
SPECIFIC GRAVITY UA: 1.02 (ref 1–1.03)
URINE REFLEX TO CULTURE: ABNORMAL
UROBILINOGEN, URINE: 0.2 E.U./DL
WBC UA: NORMAL /HPF (ref 0–5)

## 2022-07-27 PROCEDURE — 81001 URINALYSIS AUTO W/SCOPE: CPT

## 2022-07-27 PROCEDURE — 87081 CULTURE SCREEN ONLY: CPT

## 2022-07-27 PROCEDURE — 86900 BLOOD TYPING SEROLOGIC ABO: CPT

## 2022-07-27 PROCEDURE — 86901 BLOOD TYPING SEROLOGIC RH(D): CPT

## 2022-07-27 PROCEDURE — 86403 PARTICLE AGGLUT ANTBDY SCRN: CPT

## 2022-07-27 PROCEDURE — 86850 RBC ANTIBODY SCREEN: CPT

## 2022-07-27 PROCEDURE — 93005 ELECTROCARDIOGRAM TRACING: CPT | Performed by: ORTHOPAEDIC SURGERY

## 2022-07-27 RX ORDER — ESCITALOPRAM OXALATE 10 MG/1
5 TABLET ORAL
COMMUNITY
Start: 2022-03-16

## 2022-07-27 RX ORDER — GABAPENTIN 300 MG/1
CAPSULE ORAL
COMMUNITY
Start: 2022-06-29

## 2022-07-28 ENCOUNTER — ANESTHESIA EVENT (OUTPATIENT)
Dept: OPERATING ROOM | Age: 72
DRG: 468 | End: 2022-07-28
Payer: MEDICARE

## 2022-07-28 ENCOUNTER — APPOINTMENT (OUTPATIENT)
Dept: GENERAL RADIOLOGY | Age: 72
DRG: 468 | End: 2022-07-28
Attending: ORTHOPAEDIC SURGERY
Payer: MEDICARE

## 2022-07-28 ENCOUNTER — HOSPITAL ENCOUNTER (INPATIENT)
Age: 72
LOS: 1 days | Discharge: NURSING, REHAB OR LONG-TERM CARE | DRG: 468 | End: 2022-07-29
Attending: ORTHOPAEDIC SURGERY | Admitting: ORTHOPAEDIC SURGERY
Payer: MEDICARE

## 2022-07-28 ENCOUNTER — ANESTHESIA (OUTPATIENT)
Dept: OPERATING ROOM | Age: 72
DRG: 468 | End: 2022-07-28
Payer: MEDICARE

## 2022-07-28 PROBLEM — Z96.641 STATUS POST TOTAL REPLACEMENT OF RIGHT HIP: Status: ACTIVE | Noted: 2022-07-28

## 2022-07-28 LAB
EKG ATRIAL RATE: 66 BPM
EKG P AXIS: 61 DEGREES
EKG P-R INTERVAL: 158 MS
EKG Q-T INTERVAL: 402 MS
EKG QRS DURATION: 84 MS
EKG QTC CALCULATION (BAZETT): 421 MS
EKG R AXIS: 32 DEGREES
EKG T AXIS: 6 DEGREES
EKG VENTRICULAR RATE: 66 BPM

## 2022-07-28 PROCEDURE — 0SP90JZ REMOVAL OF SYNTHETIC SUBSTITUTE FROM RIGHT HIP JOINT, OPEN APPROACH: ICD-10-PCS | Performed by: ORTHOPAEDIC SURGERY

## 2022-07-28 PROCEDURE — 3600000014 HC SURGERY LEVEL 4 ADDTL 15MIN: Performed by: ORTHOPAEDIC SURGERY

## 2022-07-28 PROCEDURE — 6360000002 HC RX W HCPCS: Performed by: ORTHOPAEDIC SURGERY

## 2022-07-28 PROCEDURE — 3700000001 HC ADD 15 MINUTES (ANESTHESIA): Performed by: ORTHOPAEDIC SURGERY

## 2022-07-28 PROCEDURE — 6360000002 HC RX W HCPCS: Performed by: STUDENT IN AN ORGANIZED HEALTH CARE EDUCATION/TRAINING PROGRAM

## 2022-07-28 PROCEDURE — 6370000000 HC RX 637 (ALT 250 FOR IP): Performed by: INTERNAL MEDICINE

## 2022-07-28 PROCEDURE — 2709999900 HC NON-CHARGEABLE SUPPLY: Performed by: ORTHOPAEDIC SURGERY

## 2022-07-28 PROCEDURE — 2580000003 HC RX 258: Performed by: ORTHOPAEDIC SURGERY

## 2022-07-28 PROCEDURE — 6370000000 HC RX 637 (ALT 250 FOR IP): Performed by: ORTHOPAEDIC SURGERY

## 2022-07-28 PROCEDURE — 3700000000 HC ANESTHESIA ATTENDED CARE: Performed by: ORTHOPAEDIC SURGERY

## 2022-07-28 PROCEDURE — 2580000003 HC RX 258: Performed by: NURSE PRACTITIONER

## 2022-07-28 PROCEDURE — 7100000000 HC PACU RECOVERY - FIRST 15 MIN: Performed by: ORTHOPAEDIC SURGERY

## 2022-07-28 PROCEDURE — 73501 X-RAY EXAM HIP UNI 1 VIEW: CPT

## 2022-07-28 PROCEDURE — 6360000002 HC RX W HCPCS: Performed by: REGISTERED NURSE

## 2022-07-28 PROCEDURE — 7100000001 HC PACU RECOVERY - ADDTL 15 MIN: Performed by: ORTHOPAEDIC SURGERY

## 2022-07-28 PROCEDURE — 2500000003 HC RX 250 WO HCPCS: Performed by: STUDENT IN AN ORGANIZED HEALTH CARE EDUCATION/TRAINING PROGRAM

## 2022-07-28 PROCEDURE — 97535 SELF CARE MNGMENT TRAINING: CPT

## 2022-07-28 PROCEDURE — 2500000003 HC RX 250 WO HCPCS: Performed by: REGISTERED NURSE

## 2022-07-28 PROCEDURE — 0SR904A REPLACEMENT OF RIGHT HIP JOINT WITH CERAMIC ON POLYETHYLENE SYNTHETIC SUBSTITUTE, UNCEMENTED, OPEN APPROACH: ICD-10-PCS | Performed by: ORTHOPAEDIC SURGERY

## 2022-07-28 PROCEDURE — 2580000003 HC RX 258: Performed by: STUDENT IN AN ORGANIZED HEALTH CARE EDUCATION/TRAINING PROGRAM

## 2022-07-28 PROCEDURE — 3600000004 HC SURGERY LEVEL 4 BASE: Performed by: ORTHOPAEDIC SURGERY

## 2022-07-28 PROCEDURE — C1776 JOINT DEVICE (IMPLANTABLE): HCPCS | Performed by: ORTHOPAEDIC SURGERY

## 2022-07-28 PROCEDURE — 97162 PT EVAL MOD COMPLEX 30 MIN: CPT

## 2022-07-28 PROCEDURE — 1210000000 HC MED SURG R&B

## 2022-07-28 DEVICE — X3 INSERT FOR ADM/ MDM
Type: IMPLANTABLE DEVICE | Site: HIP | Status: FUNCTIONAL
Brand: X3

## 2022-07-28 DEVICE — USE ONLY WITH V40 TAPER STEMS
Type: IMPLANTABLE DEVICE | Site: HIP | Status: FUNCTIONAL
Brand: SLEEVE

## 2022-07-28 DEVICE — LINER- CEMENTLESS
Type: IMPLANTABLE DEVICE | Site: HIP | Status: FUNCTIONAL
Brand: MDM

## 2022-07-28 DEVICE — CERAMIC C-TAPER FEMORAL HEAD
Type: IMPLANTABLE DEVICE | Site: HIP | Status: FUNCTIONAL
Brand: BIOLOX

## 2022-07-28 RX ORDER — GABAPENTIN 300 MG/1
300 CAPSULE ORAL 3 TIMES DAILY
Status: DISCONTINUED | OUTPATIENT
Start: 2022-07-28 | End: 2022-07-29 | Stop reason: HOSPADM

## 2022-07-28 RX ORDER — SODIUM CHLORIDE, SODIUM LACTATE, POTASSIUM CHLORIDE, CALCIUM CHLORIDE 600; 310; 30; 20 MG/100ML; MG/100ML; MG/100ML; MG/100ML
INJECTION, SOLUTION INTRAVENOUS CONTINUOUS
Status: CANCELLED | OUTPATIENT
Start: 2022-07-28

## 2022-07-28 RX ORDER — SODIUM CHLORIDE 0.9 % (FLUSH) 0.9 %
5-40 SYRINGE (ML) INJECTION PRN
Status: DISCONTINUED | OUTPATIENT
Start: 2022-07-28 | End: 2022-07-28 | Stop reason: HOSPADM

## 2022-07-28 RX ORDER — SODIUM CHLORIDE 0.9 % (FLUSH) 0.9 %
5-40 SYRINGE (ML) INJECTION PRN
Status: CANCELLED | OUTPATIENT
Start: 2022-07-28

## 2022-07-28 RX ORDER — CETIRIZINE HYDROCHLORIDE 10 MG/1
10 TABLET ORAL DAILY
Refills: 1 | Status: DISCONTINUED | OUTPATIENT
Start: 2022-07-28 | End: 2022-07-29 | Stop reason: HOSPADM

## 2022-07-28 RX ORDER — ESCITALOPRAM OXALATE 10 MG/1
5 TABLET ORAL DAILY
Status: DISCONTINUED | OUTPATIENT
Start: 2022-07-28 | End: 2022-07-29 | Stop reason: HOSPADM

## 2022-07-28 RX ORDER — SODIUM CHLORIDE 0.9 % (FLUSH) 0.9 %
5-40 SYRINGE (ML) INJECTION PRN
Status: DISCONTINUED | OUTPATIENT
Start: 2022-07-28 | End: 2022-07-29 | Stop reason: HOSPADM

## 2022-07-28 RX ORDER — SENNA AND DOCUSATE SODIUM 50; 8.6 MG/1; MG/1
1 TABLET, FILM COATED ORAL 2 TIMES DAILY
Status: DISCONTINUED | OUTPATIENT
Start: 2022-07-28 | End: 2022-07-29 | Stop reason: HOSPADM

## 2022-07-28 RX ORDER — DIPHENHYDRAMINE HYDROCHLORIDE 50 MG/ML
12.5 INJECTION INTRAMUSCULAR; INTRAVENOUS
Status: DISCONTINUED | OUTPATIENT
Start: 2022-07-28 | End: 2022-07-28 | Stop reason: HOSPADM

## 2022-07-28 RX ORDER — HYDRALAZINE HYDROCHLORIDE 20 MG/ML
10 INJECTION INTRAMUSCULAR; INTRAVENOUS
Status: DISCONTINUED | OUTPATIENT
Start: 2022-07-28 | End: 2022-07-28 | Stop reason: HOSPADM

## 2022-07-28 RX ORDER — SODIUM CHLORIDE 0.9 % (FLUSH) 0.9 %
5-40 SYRINGE (ML) INJECTION EVERY 12 HOURS SCHEDULED
Status: CANCELLED | OUTPATIENT
Start: 2022-07-28

## 2022-07-28 RX ORDER — OXYCODONE HYDROCHLORIDE 5 MG/1
5 TABLET ORAL PRN
Status: DISCONTINUED | OUTPATIENT
Start: 2022-07-28 | End: 2022-07-28 | Stop reason: HOSPADM

## 2022-07-28 RX ORDER — CELECOXIB 200 MG/1
200 CAPSULE ORAL ONCE
Status: COMPLETED | OUTPATIENT
Start: 2022-07-28 | End: 2022-07-28

## 2022-07-28 RX ORDER — SODIUM CHLORIDE 9 MG/ML
25 INJECTION, SOLUTION INTRAVENOUS PRN
Status: DISCONTINUED | OUTPATIENT
Start: 2022-07-28 | End: 2022-07-28 | Stop reason: HOSPADM

## 2022-07-28 RX ORDER — SODIUM CHLORIDE, SODIUM LACTATE, POTASSIUM CHLORIDE, CALCIUM CHLORIDE 600; 310; 30; 20 MG/100ML; MG/100ML; MG/100ML; MG/100ML
INJECTION, SOLUTION INTRAVENOUS CONTINUOUS
Status: DISCONTINUED | OUTPATIENT
Start: 2022-07-28 | End: 2022-07-28 | Stop reason: HOSPADM

## 2022-07-28 RX ORDER — TRANEXAMIC ACID 650 1/1
1950 TABLET ORAL ONCE
Status: COMPLETED | OUTPATIENT
Start: 2022-07-28 | End: 2022-07-28

## 2022-07-28 RX ORDER — ACETAMINOPHEN 325 MG/1
650 TABLET ORAL EVERY 6 HOURS
Status: DISCONTINUED | OUTPATIENT
Start: 2022-07-28 | End: 2022-07-29 | Stop reason: HOSPADM

## 2022-07-28 RX ORDER — ROSUVASTATIN CALCIUM 5 MG/1
10 TABLET, COATED ORAL NIGHTLY
Status: DISCONTINUED | OUTPATIENT
Start: 2022-07-28 | End: 2022-07-29 | Stop reason: HOSPADM

## 2022-07-28 RX ORDER — FENTANYL CITRATE 50 UG/ML
25 INJECTION, SOLUTION INTRAMUSCULAR; INTRAVENOUS EVERY 5 MIN PRN
Status: DISCONTINUED | OUTPATIENT
Start: 2022-07-28 | End: 2022-07-28 | Stop reason: HOSPADM

## 2022-07-28 RX ORDER — OXYCODONE HYDROCHLORIDE 5 MG/1
5 TABLET ORAL EVERY 4 HOURS PRN
Status: DISCONTINUED | OUTPATIENT
Start: 2022-07-28 | End: 2022-07-29 | Stop reason: HOSPADM

## 2022-07-28 RX ORDER — SODIUM CHLORIDE 0.9 % (FLUSH) 0.9 %
5-40 SYRINGE (ML) INJECTION EVERY 12 HOURS SCHEDULED
Status: DISCONTINUED | OUTPATIENT
Start: 2022-07-28 | End: 2022-07-28 | Stop reason: HOSPADM

## 2022-07-28 RX ORDER — MORPHINE SULFATE 2 MG/ML
2 INJECTION, SOLUTION INTRAMUSCULAR; INTRAVENOUS
Status: DISCONTINUED | OUTPATIENT
Start: 2022-07-28 | End: 2022-07-29

## 2022-07-28 RX ORDER — GLYCOPYRROLATE 0.2 MG/ML
INJECTION INTRAMUSCULAR; INTRAVENOUS PRN
Status: DISCONTINUED | OUTPATIENT
Start: 2022-07-28 | End: 2022-07-28 | Stop reason: SDUPTHER

## 2022-07-28 RX ORDER — OXYCODONE HYDROCHLORIDE 5 MG/1
10 TABLET ORAL ONCE
Status: COMPLETED | OUTPATIENT
Start: 2022-07-28 | End: 2022-07-28

## 2022-07-28 RX ORDER — FENTANYL CITRATE 50 UG/ML
50 INJECTION, SOLUTION INTRAMUSCULAR; INTRAVENOUS EVERY 5 MIN PRN
Status: DISCONTINUED | OUTPATIENT
Start: 2022-07-28 | End: 2022-07-28 | Stop reason: HOSPADM

## 2022-07-28 RX ORDER — MIDAZOLAM HYDROCHLORIDE 1 MG/ML
INJECTION INTRAMUSCULAR; INTRAVENOUS
Status: DISPENSED
Start: 2022-07-28 | End: 2022-07-29

## 2022-07-28 RX ORDER — LABETALOL HYDROCHLORIDE 5 MG/ML
10 INJECTION, SOLUTION INTRAVENOUS
Status: DISCONTINUED | OUTPATIENT
Start: 2022-07-28 | End: 2022-07-28 | Stop reason: HOSPADM

## 2022-07-28 RX ORDER — LORAZEPAM 1 MG/1
1 TABLET ORAL 3 TIMES DAILY PRN
Status: DISCONTINUED | OUTPATIENT
Start: 2022-07-28 | End: 2022-07-29 | Stop reason: HOSPADM

## 2022-07-28 RX ORDER — ONDANSETRON 2 MG/ML
4 INJECTION INTRAMUSCULAR; INTRAVENOUS EVERY 6 HOURS PRN
Status: DISCONTINUED | OUTPATIENT
Start: 2022-07-28 | End: 2022-07-29 | Stop reason: HOSPADM

## 2022-07-28 RX ORDER — OXYCODONE HYDROCHLORIDE 5 MG/1
10 TABLET ORAL PRN
Status: DISCONTINUED | OUTPATIENT
Start: 2022-07-28 | End: 2022-07-28 | Stop reason: HOSPADM

## 2022-07-28 RX ORDER — MAGNESIUM HYDROXIDE 1200 MG/15ML
LIQUID ORAL CONTINUOUS PRN
Status: DISCONTINUED | OUTPATIENT
Start: 2022-07-28 | End: 2022-07-28 | Stop reason: HOSPADM

## 2022-07-28 RX ORDER — ONDANSETRON 2 MG/ML
4 INJECTION INTRAMUSCULAR; INTRAVENOUS
Status: DISCONTINUED | OUTPATIENT
Start: 2022-07-28 | End: 2022-07-28 | Stop reason: HOSPADM

## 2022-07-28 RX ORDER — ASPIRIN 81 MG/1
81 TABLET ORAL 2 TIMES DAILY
Status: DISCONTINUED | OUTPATIENT
Start: 2022-07-28 | End: 2022-07-29 | Stop reason: HOSPADM

## 2022-07-28 RX ORDER — TRANEXAMIC ACID 650 1/1
1950 TABLET ORAL ONCE
Status: COMPLETED | OUTPATIENT
Start: 2022-07-29 | End: 2022-07-29

## 2022-07-28 RX ORDER — PROPOFOL 10 MG/ML
INJECTION, EMULSION INTRAVENOUS CONTINUOUS PRN
Status: DISCONTINUED | OUTPATIENT
Start: 2022-07-28 | End: 2022-07-28 | Stop reason: SDUPTHER

## 2022-07-28 RX ORDER — ONDANSETRON 4 MG/1
4 TABLET, ORALLY DISINTEGRATING ORAL EVERY 8 HOURS PRN
Status: DISCONTINUED | OUTPATIENT
Start: 2022-07-28 | End: 2022-07-29 | Stop reason: HOSPADM

## 2022-07-28 RX ORDER — SODIUM CHLORIDE 9 MG/ML
INJECTION, SOLUTION INTRAVENOUS PRN
Status: CANCELLED | OUTPATIENT
Start: 2022-07-28

## 2022-07-28 RX ORDER — SODIUM CHLORIDE 9 MG/ML
INJECTION, SOLUTION INTRAVENOUS PRN
Status: DISCONTINUED | OUTPATIENT
Start: 2022-07-28 | End: 2022-07-29 | Stop reason: HOSPADM

## 2022-07-28 RX ORDER — MEPERIDINE HYDROCHLORIDE 25 MG/ML
12.5 INJECTION INTRAMUSCULAR; INTRAVENOUS; SUBCUTANEOUS EVERY 5 MIN PRN
Status: DISCONTINUED | OUTPATIENT
Start: 2022-07-28 | End: 2022-07-28 | Stop reason: HOSPADM

## 2022-07-28 RX ORDER — SODIUM CHLORIDE, SODIUM LACTATE, POTASSIUM CHLORIDE, CALCIUM CHLORIDE 600; 310; 30; 20 MG/100ML; MG/100ML; MG/100ML; MG/100ML
INJECTION, SOLUTION INTRAVENOUS CONTINUOUS
Status: DISCONTINUED | OUTPATIENT
Start: 2022-07-28 | End: 2022-07-29 | Stop reason: HOSPADM

## 2022-07-28 RX ORDER — SODIUM CHLORIDE, SODIUM LACTATE, POTASSIUM CHLORIDE, CALCIUM CHLORIDE 600; 310; 30; 20 MG/100ML; MG/100ML; MG/100ML; MG/100ML
INJECTION, SOLUTION INTRAVENOUS CONTINUOUS
Status: ACTIVE | OUTPATIENT
Start: 2022-07-28 | End: 2022-07-29

## 2022-07-28 RX ORDER — ACETAMINOPHEN 500 MG
1000 TABLET ORAL ONCE
Status: COMPLETED | OUTPATIENT
Start: 2022-07-28 | End: 2022-07-28

## 2022-07-28 RX ORDER — SODIUM CHLORIDE 0.9 % (FLUSH) 0.9 %
5-40 SYRINGE (ML) INJECTION EVERY 12 HOURS SCHEDULED
Status: DISCONTINUED | OUTPATIENT
Start: 2022-07-28 | End: 2022-07-29 | Stop reason: HOSPADM

## 2022-07-28 RX ORDER — PROCHLORPERAZINE EDISYLATE 5 MG/ML
5 INJECTION INTRAMUSCULAR; INTRAVENOUS
Status: DISCONTINUED | OUTPATIENT
Start: 2022-07-28 | End: 2022-07-28 | Stop reason: HOSPADM

## 2022-07-28 RX ORDER — SODIUM CHLORIDE 9 MG/ML
INJECTION, SOLUTION INTRAVENOUS PRN
Status: DISCONTINUED | OUTPATIENT
Start: 2022-07-28 | End: 2022-07-28 | Stop reason: HOSPADM

## 2022-07-28 RX ORDER — BUPIVACAINE HYDROCHLORIDE 7.5 MG/ML
INJECTION, SOLUTION INTRASPINAL
Status: COMPLETED | OUTPATIENT
Start: 2022-07-28 | End: 2022-07-28

## 2022-07-28 RX ADMIN — PROPOFOL 100 MCG/KG/MIN: 10 INJECTION, EMULSION INTRAVENOUS at 13:37

## 2022-07-28 RX ADMIN — TRANEXAMIC ACID 1950 MG: 650 TABLET ORAL at 18:37

## 2022-07-28 RX ADMIN — Medication 10 ML: at 23:01

## 2022-07-28 RX ADMIN — ACETAMINOPHEN 1000 MG: 500 TABLET ORAL at 11:32

## 2022-07-28 RX ADMIN — GABAPENTIN 300 MG: 300 CAPSULE ORAL at 23:00

## 2022-07-28 RX ADMIN — CELECOXIB 200 MG: 200 CAPSULE ORAL at 11:32

## 2022-07-28 RX ADMIN — PHENOL 1 SPRAY: 1.5 LIQUID ORAL at 23:01

## 2022-07-28 RX ADMIN — OXYCODONE 5 MG: 5 TABLET ORAL at 23:58

## 2022-07-28 RX ADMIN — SODIUM CHLORIDE, POTASSIUM CHLORIDE, SODIUM LACTATE AND CALCIUM CHLORIDE: 600; 310; 30; 20 INJECTION, SOLUTION INTRAVENOUS at 11:47

## 2022-07-28 RX ADMIN — SODIUM CHLORIDE, POTASSIUM CHLORIDE, SODIUM LACTATE AND CALCIUM CHLORIDE: 600; 310; 30; 20 INJECTION, SOLUTION INTRAVENOUS at 13:30

## 2022-07-28 RX ADMIN — MEPERIDINE HYDROCHLORIDE 12.5 MG: 25 INJECTION, SOLUTION INTRAMUSCULAR; INTRAVENOUS; SUBCUTANEOUS at 15:31

## 2022-07-28 RX ADMIN — GLYCOPYRROLATE 0.4 MG: 0.2 INJECTION INTRAMUSCULAR; INTRAVENOUS at 13:52

## 2022-07-28 RX ADMIN — CEFAZOLIN 2000 MG: 10 INJECTION, POWDER, FOR SOLUTION INTRAVENOUS at 13:30

## 2022-07-28 RX ADMIN — TRANEXAMIC ACID 1950 MG: 650 TABLET ORAL at 11:36

## 2022-07-28 RX ADMIN — OXYCODONE 5 MG: 5 TABLET ORAL at 18:37

## 2022-07-28 RX ADMIN — ACETAMINOPHEN 650 MG: 325 TABLET ORAL at 18:37

## 2022-07-28 RX ADMIN — ROSUVASTATIN CALCIUM 10 MG: 5 TABLET, FILM COATED ORAL at 23:00

## 2022-07-28 RX ADMIN — SODIUM CHLORIDE, POTASSIUM CHLORIDE, SODIUM LACTATE AND CALCIUM CHLORIDE: 600; 310; 30; 20 INJECTION, SOLUTION INTRAVENOUS at 18:37

## 2022-07-28 RX ADMIN — MEPERIDINE HYDROCHLORIDE 12.5 MG: 25 INJECTION, SOLUTION INTRAMUSCULAR; INTRAVENOUS; SUBCUTANEOUS at 15:37

## 2022-07-28 RX ADMIN — OXYCODONE 10 MG: 5 TABLET ORAL at 11:32

## 2022-07-28 RX ADMIN — ACETAMINOPHEN 650 MG: 325 TABLET ORAL at 23:04

## 2022-07-28 RX ADMIN — CEFAZOLIN 2000 MG: 10 INJECTION, POWDER, FOR SOLUTION INTRAVENOUS at 23:05

## 2022-07-28 RX ADMIN — SENNOSIDES AND DOCUSATE SODIUM 1 TABLET: 50; 8.6 TABLET ORAL at 23:00

## 2022-07-28 RX ADMIN — ASPIRIN 81 MG: 81 TABLET, COATED ORAL at 23:00

## 2022-07-28 RX ADMIN — BUPIVACAINE HYDROCHLORIDE 12 MG: 7.5 INJECTION, SOLUTION INTRASPINAL at 13:25

## 2022-07-28 ASSESSMENT — PAIN DESCRIPTION - ORIENTATION: ORIENTATION: RIGHT

## 2022-07-28 ASSESSMENT — PAIN SCALES - GENERAL
PAINLEVEL_OUTOF10: 8
PAINLEVEL_OUTOF10: 10
PAINLEVEL_OUTOF10: 8
PAINLEVEL_OUTOF10: 10
PAINLEVEL_OUTOF10: 6
PAINLEVEL_OUTOF10: 8
PAINLEVEL_OUTOF10: 0

## 2022-07-28 ASSESSMENT — PAIN DESCRIPTION - LOCATION: LOCATION: HIP

## 2022-07-28 ASSESSMENT — PAIN - FUNCTIONAL ASSESSMENT: PAIN_FUNCTIONAL_ASSESSMENT: ACTIVITIES ARE NOT PREVENTED

## 2022-07-28 NOTE — PROGRESS NOTES
Physical Therapy Med Surg Initial Assessment  Facility/Department: [facility]  Room: I71T982-74       NAME: Asya Chance  : 1950 (36 y.o.)  MRN: [id number]  CODE STATUS: Full Code    Date of Service: 2022    Patient Diagnosis(es): Instability of hip joint, right [M25.351]  Status post total replacement of right hip [Z96.641]   No chief complaint on file.     Patient Active Problem List    Diagnosis Date Noted    Status post total replacement of right hip 2022    Cervical spine disease 2018    Migraine 2018    Right leg numbness 2018    Osteoarthritis of left knee 2017    COPD (chronic obstructive pulmonary disease) ([facility] [address] 2017    DDD (degenerative disc disease), lumbar 2016    PADMINI (generalized anxiety disorder) 2015    History of tobacco use 2014    S/P cervical spinal fusion, 5-6 2014    CTS (carpal tunnel syndrome), Ortho 2014    Cervical radiculitis, surgery pending 2014    CAD (coronary artery disease) 2014    Cat bite involving extremity 2013    Cellulitis, improving 2013    Allergic rhinitis, Dr Lit Benton 10/28/2013    Dyslipidemia 10/28/2013    Hx of cardiac cath x 2, normal, last 9/13 10/07/2013    Fatigue 10/07/2013    Postlaminectomy syndrome, cervical region 2013    Family history of heart disease 2013    Osteoarthritis     Headache     Hernia, hiatal     Anxiety     Myalgia 10/16/2009    Other symptoms referable to back 2007        Past Medical History:   Diagnosis Date    Allergic rhinitis, Dr Lit Benton 10/28/2013    Anxiety     Back pain     CAD (coronary artery disease)     Cat bite involving extremity 2013    Cat bite involving extremity 2013    Cellulitis 2013    Cellulitis, improving 2013    Cervical radiculitis 2014    Cervical radiculitis, surgery pending 2014    COPD (chronic obstructive pulmonary disease) ([facility] [address] 1/3/2017    CTS (carpal tunnel syndrome), Ortho 1/13/2014    Dyslipidemia 10/28/2013    Fatigue 10/7/2013    Headache(784.0)     H/O MIGRANES    Hernia, hiatal     H/O     Hx of cardiac cath x 2, normal, last 9/13 10/7/2013    Osteoarthritis     S/P cervical spinal fusion, 5-6 1/13/2014    Seizure disorder ([address]     Seizure disorder ([address]     Sinusitis 10/28/2013    Tobacco abuse, quit 9/3/13 10/7/2013    Tobacco abuse, quit 9/3/13      Past Surgical History:   Procedure Laterality Date    APPENDECTOMY      CARDIAC CATHETERIZATION  09/05/2013     [address]    fusion    CHOLECYSTECTOMY      COLONOSCOPY  2009    COSMETIC SURGERY  2010    on eye    JOINT REPLACEMENT Right 08/09/2021    hip    [place] TOTAL KNEE ARTHROPLASTY Left 12/05/2017    LEFT TOTAL KNEE ARTHROPLASTY SUPINE BELEN PSI SPINAL (OUTSIDE PAT DR. OLIVA) performed by Sue Miller MD at [address] [place] [address]       Chart Reviewed: Yes  Family / Caregiver Present: No  Diagnosis: Status post total replacement of right hip    Restrictions:  Restrictions/Precautions: Weight Bearing  Lower Extremity Weight Bearing Restrictions  Right Lower Extremity Weight Bearing: Weight Bearing As Tolerated  Left Lower Extremity Weight Bearing: Weight Bearing As Tolerated  Position Activity Restriction  Hip Precautions: Posterior hip precautions     SUBJECTIVE:        Pain  Pain: initial and ending pain at 7/10 in right hip  - nursing aware of need for pain meds    Prior Level of Function:  Social/Functional History  Lives With: Alone  Type of Home: House  Home Layout: Two level, Able to Live on Main level with bedroom/bathroom, Bed/Bath upstairs (has BSC and couch on first level if needed)  Home Access: Stairs to enter with rails  Entrance Stairs - Number of Steps: 3 with one rail: 12 to second level - +3 more  Home Equipment: Reacher, Walker, rolling, Cane, Long-handled shoehorn  Has the patient had two or more falls in the past year or any fall with injury in the past year?:  (hip dislocations x2)  ADL Assistance: Independent  Homemaking Assistance: Independent  Ambulation Assistance: Independent (with ww)  Transfer Assistance: Independent  Additional Comments: has 3 children - one does not work - pt states she has a friend that can stay with her initially and she will stay on first level if d/c is to home    OBJECTIVE:   Vision  Vision: Within Functional Limits  Hearing: Within functional limits    Cognition:  Follows Commands: Within Functional Limits         ROM:  AROM: Generally decreased, functional    Strength:  Strength: Generally decreased, functional    Neuro:  Balance  Sitting - Static: Good  Sitting - Dynamic: Good  Standing - Static: Fair;-  Standing - Dynamic: Fair;-  Comments: Pt requires BUE supprot to maintain balance with intermittent min assist                      Coordination: Generally decreased, functional         Bed mobility  Supine to Sit: Minimal assistance  Sit to Supine: Minimal assistance  Scooting: Minimal assistance  Bed Mobility Comments: assistance for RLE to ensure hip precautions    Transfers  Sit to Stand: Stand by assistance;Minimal Assistance  Stand to sit: Stand by assistance;Minimal Assistance  Bed to Chair: Stand by assistance (to [facility] with ww)  Comment: indep with wiping self in sitting    Ambulation  Surface: level tile  Device: Rolling Walker  Assistance: Minimal assistance  Quality of Gait: decreased RLE stance time with heavy UE support required, mild unsteadiness with min assist for recovery, mild conern for hip recautioin follow thru with change of direction                   Activity Tolerance  Activity Tolerance: Patient tolerated evaluation without incident    Patient Education  Education Provided: Role of Therapy;Plan of Care;Precautions  Education Method: Demonstration;Verbal  Barriers to Learning: None  Education Outcome: Verbalized understanding;Continued education needed       ASSESSMENT:   Body Structures, Functions, Activity Limitations Requiring Skilled Therapeutic Intervention: Decreased functional mobility ; Decreased strength;Decreased balance;Decreased endurance  Decision Making: Medium Complexity  History: high  Exam: med  Clinical Presentation: med    Barriers to Learning: none         DISCHARGE RECOMMENDATIONS:  Discharge Recommendations: Continue to assess pending progress    Assessment: Pt demonstrates deficits as listed. Pt was previously at an indep level of function. Pt is aware of hip precautions but does require assist to follow them at this time  Requires PT Follow-Up: Yes      PLAN OF CARE:  Plan  Plan: 2 times a day 7 days a week  Current Treatment Recommendations: Strengthening, Balance training, Functional mobility training, Transfer training, Gait training, Endurance training, Stair training, Neuromuscular re-education, Home exercise program, Safety education & training, Equipment evaluation, education, & procurement    Safety Devices  Type of Devices: Bed alarm in place, Call light within reach, Left in bed    Goals:  Long Term Goals  Long term goal 1: indep bed mobility  Long term goal 2: indep sit to stand and bed transfers - SBA car transfers  Long term goal 3: indep gait 50 feet with ww  Long term goal 4: supervision stairs for safe return to home with appropriate rails  Long term goal 5: pt able to follow hip precautions indep    [facility] (6 CLICK) BASIC MOBILITY  AM-PAC Inpatient Mobility Raw Score : 17     Therapy Time:   Individual   Time In 1745   Time Out 1816   Minutes 31     Eval:20 min  Transfers:11 min       Claude Lombardo PT, 07/28/22 at 6:44 PM         Definitions for assistance levels  Independent = pt does not require any physical supervision or assistance from another person for activity completion. Device may be needed.   Stand by assistance = pt requires verbal cues or instructions from another person, close to but not touching, to perform the activity  Minimal assistance= pt performs 75% or more of the activity; assistance is required to complete the activity  Moderate assistance= pt performs 50% of the activity; assistance is required to complete the activity  Maximal assistance = pt performs 25% of the activity; assistance is required to complete the activity  Dependent = pt requires total physical assistance to accomplish the task

## 2022-07-28 NOTE — CONSULTS
Patient is a 81-year-old female with past medical history of anxiety, cervical radiculitis, this post right hip revision with a total hip arthroplasty. Tolerated surgery well. Complains of sore throat. Patient was intubated for procedure. No fever              Past Medical History:   Diagnosis Date    Allergic rhinitis, Dr Clarita Maritnez 10/28/2013    Anxiety     Back pain     CAD (coronary artery disease)     Cat bite involving extremity 11/5/2013    Cat bite involving extremity 11/5/2013    Cellulitis 11/5/2013    Cellulitis, improving 11/5/2013    Cervical radiculitis 1/13/2014    Cervical radiculitis, surgery pending 1/13/2014    COPD (chronic obstructive pulmonary disease) ([facility] [address] 1/3/2017    CTS (carpal tunnel syndrome), Ortho 1/13/2014    Dyslipidemia 10/28/2013    Fatigue 10/7/2013    Headache(784.0)     H/O MIGRANES    Hernia, hiatal     H/O     Hx of cardiac cath x 2, normal, last 9/13 10/7/2013    Osteoarthritis     S/P cervical spinal fusion, 5-6 1/13/2014    Seizure disorder ([address]     Seizure disorder ([address]     Sinusitis 10/28/2013    Tobacco abuse, quit 9/3/13 10/7/2013    Tobacco abuse, quit 9/3/13      Past Surgical History:   Procedure Laterality Date    APPENDECTOMY      CARDIAC CATHETERIZATION  09/05/2013     [address]    fusion    CHOLECYSTECTOMY      COLONOSCOPY  2009    COSMETIC SURGERY  2010    on eye    JOINT REPLACEMENT Right 08/09/2021    hip    [place] TOTAL KNEE ARTHROPLASTY Left 12/05/2017    LEFT TOTAL KNEE ARTHROPLASTY SUPINE BELEN PSI SPINAL (OUTSIDE PAT DR. OLIVA) performed by Antonio Naranjo MD at [facility] History       Tobacco History       Smoking Status  Former Quit Date  9/6/2013 Smoking Frequency  0.50 packs/day for 20.00 years (10.00 pk-yrs) Smoking Tobacco Type  Cigarettes quit in 9/6/2013      Smokeless Tobacco Use  Never              Alcohol History       Alcohol Use Status  No              Drug Use       Drug Use Status  Never              Sexual Activity       Sexually Active  Not Asked                  Family History   Problem Relation Age of Onset    Heart Disease Father         MI    High Blood Pressure Father     Depression Mother      No current facility-administered medications on file prior to encounter. Current Outpatient Medications on File Prior to Encounter   Medication Sig Dispense Refill    topiramate (TOPAMAX) 25 MG tablet Take 25 mg by mouth 2 times daily (Patient not taking: No sig reported)      nitroGLYCERIN (NITROSTAT) 0.4 MG SL tablet Place 0.4 mg under the tongue every 5 minutes as needed for Chest pain up to max of 3 total doses. If no relief after 1 dose, call 911. (Patient not taking: Reported on 7/28/2022)      Probiotic Product (PROBIOTIC ADVANCED PO) Take by mouth      sodium chloride (OCEAN, BABY AYR) 0.65 % nasal spray 1 spray by Nasal route as needed for Congestion      Cholecalciferol (VITAMIN D3) 50 MCG (2000 UT) CAPS Take by mouth      LORazepam (ATIVAN) 1 MG tablet Take 1 mg by mouth 3 times daily as needed for Anxiety.        rosuvastatin (CRESTOR) 10 MG tablet Take 10 mg by mouth daily      loratadine (CLARITIN) 10 MG tablet Take 1 tablet by mouth daily 30 tablet 1    aspirin 81 MG EC tablet Take 1 tablet by mouth 2 times daily 60 tablet 0     Lab Results   Component Value Date    WBC 5.3 07/20/2022    HGB 11.8 (L) 07/20/2022    HCT 36.0 (L) 07/20/2022    MCV 87.5 07/20/2022     07/20/2022     Lab Results   Component Value Date/Time     07/20/2022 09:15 AM    K 3.6 07/20/2022 09:15 AM     07/20/2022 09:15 AM    CO2 23 07/20/2022 09:15 AM    BUN 14 07/20/2022 09:15 AM    CREATININE 0.70 07/20/2022 09:15 AM    GLUCOSE 108 07/20/2022 09:15 AM    GLUCOSE 81 09/29/2011 10:44 AM    CALCIUM 9.3 07/20/2022 09:15 AM    ROS: 12 point review of system is negative except was stated in HPI  HEENT: AT/NC, PERRLA, no JVD  HEART: s1/s2 wnl w/o s3  LUNG: clear  ABD: soft, NT, ND  EXT: no edema  SKin : no rash  Neuro:no focal deficits  1) encounter for post surgical care  Pain is controlled    2) DVT px

## 2022-07-28 NOTE — ANESTHESIA POSTPROCEDURE EVALUATION
Department of Anesthesiology  Postprocedure Note    Patient: Marily Jeffery  MRN: [id number]  YOB: 1950  Date of evaluation: 7/28/2022      Procedure Summary     Date: 07/28/22 Room / Location: [address]    Anesthesia Start: 1330 Anesthesia Stop: 2044    Procedure: RIGHT HIP REVISION TOTAL HIP ARTHROPLASTY (Right: Hip) Diagnosis:       Instability of hip joint, right      (RIGHT HIP UNSTABLE TOTAL HIP)    Surgeons: Sushant Vazquez MD Responsible Provider: Mau Kelley MD    Anesthesia Type: spinal ASA Status: 3          Anesthesia Type: No value filed.     Evelyn Phase I: Evelyn Score: 7    Evelyn Phase II:        Anesthesia Post Evaluation    Patient location during evaluation: bedside  Patient participation: complete - patient participated  Level of consciousness: awake and awake and alert  Airway patency: patent  Nausea & Vomiting: no nausea and no vomiting  Complications: no  Cardiovascular status: blood pressure returned to baseline and hemodynamically stable  Respiratory status: acceptable  Hydration status: euvolemic

## 2022-07-28 NOTE — PROGRESS NOTES
Patient ID:  Ayush Shaw  [id number]  80 y.o.  1950  BOVIE PAD SITE CLEAR AND INTACT PRE AND POST OP. TAKEN TO PACU,   ATTACHED TO MONITOR AND REPORT GIVEN TO RN.   VSS DRSG DRY AND INTACT, ABDUCTOR PILLOW IN PLACE        Electronically signed by Annalise Eisenberg RN on 7/28/2022

## 2022-07-28 NOTE — FLOWSHEET NOTE
7/28/22 @ [address] [facility] Notified Dr. Mary Kate Leon of Hospitalist consult for [address] [place] [place] [address] via [employer]

## 2022-07-28 NOTE — ANESTHESIA PROCEDURE NOTES
Spinal Block    Patient location during procedure: pre-op  End time: 7/28/2022 1:25 PM  Reason for block: primary anesthetic  Staffing  Performed: anesthesiologist   Anesthesiologist: Lauren Naidu MD  Spinal Block  Patient position: sitting  Prep: Betadine  Patient monitoring: cardiac monitor, continuous pulse ox and frequent blood pressure checks  Approach: midline  Location: L4/L5  Provider prep: mask and sterile gloves  Local infiltration: lidocaine  Needle  Needle type: Pencan   Needle gauge: 24 G  Needle length: 3.5 in  Assessment  Sensory level: T6  Events: cerebrospinal fluid  Swirl obtained: Yes (CSF aspirated)  CSF: clear  Attempts: 1  Hemodynamics: stable  Additional Notes  Free flowing CSF. Negative heme. Negative paresthesia.   .  Preanesthetic Checklist  Completed: patient identified, IV checked, site marked, risks and benefits discussed, surgical/procedural consents, equipment checked, pre-op evaluation, timeout performed, anesthesia consent given, oxygen available and monitors applied/VS acknowledged

## 2022-07-28 NOTE — OP NOTE
Operative Note      Patient: Tres Boss  YOB: 1950  MRN: [id number]    Date of Procedure: 7/28/2022    Pre-Op Diagnosis: RIGHT HIP UNSTABLE TOTAL HIP    Post-Op Diagnosis: Same       Procedure(s):  RIGHT HIP REVISION TOTAL HIP ARTHROPLASTY    Surgeon(s): Vilinda Duverney, MD    Assistant:   Physician Assistant: TAYLOR Nichols    Anesthesia: Spinal    Estimated Blood Loss (mL): less than 720     Complications: None    Specimens:   * No specimens in log *    Implants:  Implant Name Type Inv. Item Serial No.  Lot No. LRB No. Used Action   LINER ACET SZ F ID46MM HIP X3 CO CHROM CEMENTLESS MOD 2 - [id number]  LINER ACET SZ F ID46MM HIP X3 CO CHROM CEMENTLESS MOD 2 [id number] [employer] [facility] [id number] Right 1 Implanted   HEAD FEM OD28MM +2.5MM OFFSET C TAPR BIOLOX DELT CERAMIC - [id number]  HEAD FEM OD28MM +2.5MM OFFSET C TAPR BIOLOX DELT CERAMIC  [employer] [facility] [id number] Right 1 Implanted   SLEEVE FEM ADPT HIP TI FOR ALUMINA C TAPR HD TRIDENT - [id number]  SLEEVE FEM ADPT HIP TI FOR ALUMINA C TAPR HD TRIDENT [id number] [employer] [facility] [id number] Right 1 Implanted   INSERT ACET OD52MM ID28MM HIP X3 2 MOBILITY RESTR MOB BEAR - [id number]  INSERT ACET OD52MM ID28MM HIP X3 2 MOBILITY RESTR MOB BEAR [id number] [employer] [facility] [id number] Right 1 Implanted         Drains: * No LDAs found *    Findings: unstable    Detailed Description of Procedure:   Preoperative diagnosis: Unstable right total hip replacement with recurrent dislocation  Postoperative diagnosis: Same  Procedure: Revision total hip replacement      Implants Melania  Biolox delta ceramic head 28 mm neck length +2.5, MDM liner 46 mm, MDM insert 28 x 52      Primary Surgeon Fouzia Domínguez  Assistant TAYLOR Nichols certified     Clinical note  Patient with chronic  hip pain refractory to conservative treatment. Patient presents for elective  total hip replacement.   The procedure its risks and benefits treatment alternatives and postoperative course were discussed. Patient consented to the procedure and wished to proceed. Patient has progressive hip pain which has been refractory to conservative treatment. Patient has decided to undergo elective total hip replacement. The patient has pain in the hip that is increased with activity and weightbearing, the patient walks with an antalgic gait. The pain is interfering with activities of daily living. Patient has had recurrent dislocation x2 presents for revision total     Procedure  Prior to taking the patient to the operating the surgical site was marked. H&P was reviewed updated and signed and patient received appropriate preoperative antibiotic's. TAYLOR hester  [place], [place] certified was present through the entire procedure. His skills were necessary to assist with retraction, leg manipulation, reduction. The surgical scrub tech was on the back table handing insurance while he was assisting during the procedure. Patient was then placed supine on the operating table whereupon adequate spinal anesthesia was performed. patient was then placed in the lateral decubitus position being careful to pad all pressure points. The hip was prepped and draped in the usual sterile manner. Surgical timeout was then performed. A standard posterior approach to the hip was made through the previous skin incision through the skin and subcutaneous tissues. Fascia was then split in line with its fibers. Anterior posterior retractors were then inserted. Hip was maximally internally rotated and the short external rotators and piriformis tendon were taken down in 1 layer. Hip was then able to be dislocated. The liner head multiple excoriations the head was cleaned head was removed without difficulty stem was inspected it was completely seated and completely stable and in good position.   We then used 1/4 inch straight osteotome to remove the polyethylene liner without complication. Acetabular cup was inspected it was probed and stable his position was felt to be excellent. Wounds were again irrigated with a copious amount of saline irrigation. Femoral component was inspected it was in good position there is no evidence of any loosening. Trunnion was cleaned as well. We then placed in MDM trial within the acetabulum. A trial head and liner were then assembled and the hip was able to be reduced flexion extension abduction internal and external rotation were all assessed and the hip was extremely stable without evidence of subluxation or dislocation. Trial components were then removed the MDM liner was inserted within the acetabulum final head and insert were assembled on the back table and then using a protective sleeve for the V 40 C taper adapter we applied the ceramic head and it was completely seated and probed and stable. Hip was again reduced. .  Stability was assessed and felt to be satisfactory. Joint was irrigated with a copious amount of pulsatile irrigation. The joint capsule and piriformis tendon were repaired using #2 Ethibond through drill holes in the piriformis fossa. The fascia was closed using running locked #1 Vicryl. Orthomix injection was performed along the fascia and soft tissue. Deeper subcutaneous tissues were closed using #1 Vicryl as well. 3-0 Monocryl was used for the underlying subcutaneous tissue and 4-0 Monocryl was used for the skin. A dry sterile bulky dressing was applied and the patient was taken to postanesthesia care unit in good condition without complication. Rodney Kuhn MD     [facility]  [phone]        Created with [address] [place] [employer] [place], some didactic errors may be identified due to [address] [place] [employer] [place] this note has not been proofread.              Electronically signed by Rodney Kuhn MD on 7/28/2022 at 2:28 PM

## 2022-07-28 NOTE — ANESTHESIA PRE PROCEDURE
Department of Anesthesiology  Preprocedure Note       Name:  Kavita Kohler   Age:  80 y.o.  :  1950                                          MRN:  [id number]         Date:  2022      Surgeon: Bessie Bailey): Amadeo Cheung MD    Procedure: Procedure(s):  RIGHT HIP REVISION TOTAL HIP ARTHROPLASTY JONEL DUAL MOBILITY, BELEN CUP OSTEOTOME, JONEL CAPTURE CUP,  JOENL TRITANIUM CUP JEMMAZACK    Medications prior to admission:   Prior to Admission medications    Medication Sig Start Date End Date Taking? Authorizing Provider   gabapentin (NEURONTIN) 300 MG capsule TAKE 1 CAPSULE TWICE DAILY & TAKE 3 CAPSULES AT BEDTIME 22   Historical Provider, MD   escitalopram (LEXAPRO) 10 MG tablet Take 5 mg by mouth 3/16/22   Historical Provider, MD   topiramate (TOPAMAX) 25 MG tablet Take 25 mg by mouth 2 times daily  Patient not taking: No sig reported    Historical Provider, MD   nitroGLYCERIN (NITROSTAT) 0.4 MG SL tablet Place 0.4 mg under the tongue every 5 minutes as needed for Chest pain up to max of 3 total doses. If no relief after 1 dose, call 911. Patient not taking: Reported on 2022    Historical Provider, MD   Probiotic Product (PROBIOTIC ADVANCED PO) Take by mouth    Historical Provider, MD   sodium chloride (OCEAN, BABY AYR) 0.65 % nasal spray 1 spray by Nasal route as needed for Congestion    Historical Provider, MD   Cholecalciferol (VITAMIN D3) 50 MCG ( UT) CAPS Take by mouth    Historical Provider, MD   LORazepam (ATIVAN) 1 MG tablet Take 1 mg by mouth 3 times daily as needed for Anxiety.      Historical Provider, MD   rosuvastatin (CRESTOR) 10 MG tablet Take 10 mg by mouth daily    Historical Provider, MD   loratadine (CLARITIN) 10 MG tablet Take 1 tablet by mouth daily 18   Yovany Rivera MD   aspirin 81 MG EC tablet Take 1 tablet by mouth 2 times daily 17   Suzanna Colón MD       Current medications:    Current Facility-Administered Medications   Medication Dose Allergic rhinitis, Dr Raiza Montana J30.9    Dyslipidemia E78.5    Cat bite involving extremity [id number]    Cellulitis, improving L03.90    CAD (coronary artery disease) I25.10    S/P cervical spinal fusion, 5-6 Z98.1    CTS (carpal tunnel syndrome), Ortho G56.00    Cervical radiculitis, surgery pending M54.12    History of tobacco use Z87.891    PADMINI (generalized anxiety disorder) F41.1    DDD (degenerative disc disease), lumbar M51.36    Cervical spine disease M48.9    COPD [facility]chronic obstructive pulmonary disease) ([facility]) J44.9    Migraine G43.909    Myalgia M79.10    Osteoarthritis of left knee M17.12    Other symptoms referable to back M53.80    Postlaminectomy syndrome, cervical region M96.1    Right leg numbness R20.0       Past Medical History:        Diagnosis Date    Allergic rhinitis, Dr Raiza Montana 10/28/2013    Anxiety     Back pain     CAD (coronary artery disease)     Cat bite involving extremity 11/5/2013    Cat bite involving extremity 11/5/2013    Cellulitis 11/5/2013    Cellulitis, improving 11/5/2013    Cervical radiculitis 1/13/2014    Cervical radiculitis, surgery pending 1/13/2014    COPD (chronic obstructive pulmonary disease) ([facility] [address] 1/3/2017    CTS (carpal tunnel syndrome), Ortho 1/13/2014    Dyslipidemia 10/28/2013    Fatigue 10/7/2013    Headache(784.0)     H/O MIGRANES    Hernia, hiatal     H/O     Hx of cardiac cath x 2, normal, last 9/13 10/7/2013    Osteoarthritis     S/P cervical spinal fusion, 5-6 1/13/2014    Seizure disorder ([address]     Seizure disorder ([address]     Sinusitis 10/28/2013    Tobacco abuse, quit 9/3/13 10/7/2013    Tobacco abuse, quit 9/3/13        Past Surgical History:        Procedure Laterality Date    APPENDECTOMY      CARDIAC CATHETERIZATION  09/05/2013    DR. MAGGIE Samuels    fusion    CHOLECYSTECTOMY      COLONOSCOPY  2009    COSMETIC SURGERY  2010    on eye    JOINT REPLACEMENT Right 08/09/2021    hip    TX TOTAL KNEE ARTHROPLASTY Left 2017    LEFT TOTAL KNEE ARTHROPLASTY SUPINE BELEN PSI SPINAL (OUTSIDE PAT DR. OLIVA) performed by Angelica Prince MD at [address] [facility]       Social History:    Social History     Tobacco Use    Smoking status: Former     Packs/day: 0.50     Years: 20.00     Pack years: 10.00     Types: Cigarettes     Quit date: 2013     Years since quittin.8    Smokeless tobacco: Never   Substance Use Topics    Alcohol use:  No                                Counseling given: Not Answered      Vital Signs (Current):   Vitals:    22 1109   BP: 138/62   Pulse: 69   Resp: 17   Temp: 98.4 °F (36.9 °C)   TempSrc: Temporal   SpO2: 96%                                              BP Readings from Last 3 Encounters:   22 138/62   22 112/68   22 (!) 157/90       NPO Status: Time of last liquid consumption:                         Time of last solid consumption:                         Date of last liquid consumption: 22                        Date of last solid food consumption: 22    BMI:   Wt Readings from Last 3 Encounters:   22 174 lb 6.4 oz (79.1 kg)   22 170 lb (77.1 kg)   22 175 lb (79.4 kg)     There is no height or weight on file to calculate BMI.    CBC:   Lab Results   Component Value Date/Time    WBC 5.3 2022 09:15 AM    RBC 4.12 2022 09:15 AM    RBC 4.21 2011 10:44 AM    HGB 11.8 2022 09:15 AM    HCT 36.0 2022 09:15 AM    MCV 87.5 2022 09:15 AM    RDW 13.8 2022 09:15 AM     2022 09:15 AM       CMP:   Lab Results   Component Value Date/Time     2022 09:15 AM    K 3.6 2022 09:15 AM     2022 09:15 AM    CO2 23 2022 09:15 AM    BUN 14 2022 09:15 AM    CREATININE 0.70 2022 09:15 AM    GFRAA >60.0 2022 09:15 AM    LABGLOM >60.0 2022 09:15 AM    GLUCOSE 108 2022 09:15 AM    GLUCOSE 81 2011 discussed with patient. Plan discussed with CRNA.     Attending anesthesiologist reviewed and agrees with Pre Eval content                Marlo Witt MD   7/28/2022

## 2022-07-28 NOTE — H&P
History and physical is reviewed, patient re evaluated, no changes. Procedure, risks, benefits, and postoperative course were discussed with the patient and consent is reviewed.   Josep Willoughby MD

## 2022-07-29 ENCOUNTER — HOSPITAL ENCOUNTER (INPATIENT)
Age: 72
LOS: 5 days | Discharge: HOME HEALTH | DRG: 561 | End: 2022-08-03
Attending: PHYSICAL MEDICINE & REHABILITATION | Admitting: PHYSICAL MEDICINE & REHABILITATION
Payer: MEDICARE

## 2022-07-29 VITALS
DIASTOLIC BLOOD PRESSURE: 45 MMHG | OXYGEN SATURATION: 98 % | RESPIRATION RATE: 18 BRPM | SYSTOLIC BLOOD PRESSURE: 97 MMHG | HEART RATE: 63 BPM | TEMPERATURE: 97.7 F

## 2022-07-29 PROBLEM — Z74.09 IMPAIRED MOBILITY AND ACTIVITIES OF DAILY LIVING: Status: ACTIVE | Noted: 2022-07-29

## 2022-07-29 PROBLEM — I65.23 BILATERAL CAROTID ARTERY STENOSIS: Status: ACTIVE | Noted: 2022-07-29

## 2022-07-29 PROBLEM — M47.816 LUMBAR SPONDYLOSIS: Status: ACTIVE | Noted: 2021-04-21

## 2022-07-29 PROBLEM — R73.9 HYPERGLYCEMIA: Status: ACTIVE | Noted: 2022-07-29

## 2022-07-29 PROBLEM — Z78.9 IMPAIRED MOBILITY AND ACTIVITIES OF DAILY LIVING: Status: ACTIVE | Noted: 2022-07-29

## 2022-07-29 PROBLEM — E03.9 ACQUIRED HYPOTHYROIDISM: Status: ACTIVE | Noted: 2022-07-29

## 2022-07-29 PROBLEM — K55.1 CHRONIC MESENTERIC ISCHEMIA (HCC): Status: ACTIVE | Noted: 2020-07-10

## 2022-07-29 PROBLEM — F32.A DEPRESSIVE DISORDER: Status: ACTIVE | Noted: 2022-03-25

## 2022-07-29 LAB
ANION GAP SERPL CALCULATED.3IONS-SCNC: 11 MEQ/L (ref 9–15)
BUN BLDV-MCNC: 14 MG/DL (ref 8–23)
CALCIUM SERPL-MCNC: 8.7 MG/DL (ref 8.5–9.9)
CHLORIDE BLD-SCNC: 106 MEQ/L (ref 95–107)
CO2: 23 MEQ/L (ref 20–31)
CREAT SERPL-MCNC: 0.64 MG/DL (ref 0.5–0.9)
GFR AFRICAN AMERICAN: >60
GFR NON-AFRICAN AMERICAN: >60
GLUCOSE BLD-MCNC: 109 MG/DL (ref 70–99)
HCT VFR BLD CALC: 31.4 % (ref 37–47)
HEMOGLOBIN: 10.4 G/DL (ref 12–16)
MCH RBC QN AUTO: 29 PG (ref 27–31.3)
MCHC RBC AUTO-ENTMCNC: 33.2 % (ref 33–37)
MCV RBC AUTO: 87.5 FL (ref 82–100)
PDW BLD-RTO: 13.5 % (ref 11.5–14.5)
PLATELET # BLD: 226 K/UL (ref 130–400)
POTASSIUM REFLEX MAGNESIUM: 4.1 MEQ/L (ref 3.4–4.9)
RBC # BLD: 3.59 M/UL (ref 4.2–5.4)
SARS-COV-2, NAAT: NOT DETECTED
SODIUM BLD-SCNC: 140 MEQ/L (ref 135–144)
WBC # BLD: 5 K/UL (ref 4.8–10.8)

## 2022-07-29 PROCEDURE — 6370000000 HC RX 637 (ALT 250 FOR IP): Performed by: ORTHOPAEDIC SURGERY

## 2022-07-29 PROCEDURE — 36415 COLL VENOUS BLD VENIPUNCTURE: CPT

## 2022-07-29 PROCEDURE — 97166 OT EVAL MOD COMPLEX 45 MIN: CPT

## 2022-07-29 PROCEDURE — 6370000000 HC RX 637 (ALT 250 FOR IP): Performed by: INTERNAL MEDICINE

## 2022-07-29 PROCEDURE — 97116 GAIT TRAINING THERAPY: CPT

## 2022-07-29 PROCEDURE — 97535 SELF CARE MNGMENT TRAINING: CPT

## 2022-07-29 PROCEDURE — 1180000000 HC REHAB R&B

## 2022-07-29 PROCEDURE — 99221 1ST HOSP IP/OBS SF/LOW 40: CPT | Performed by: PHYSICAL MEDICINE & REHABILITATION

## 2022-07-29 PROCEDURE — 6370000000 HC RX 637 (ALT 250 FOR IP): Performed by: NURSE PRACTITIONER

## 2022-07-29 PROCEDURE — 2580000003 HC RX 258: Performed by: NURSE PRACTITIONER

## 2022-07-29 PROCEDURE — 80048 BASIC METABOLIC PNL TOTAL CA: CPT

## 2022-07-29 PROCEDURE — 2580000003 HC RX 258: Performed by: ORTHOPAEDIC SURGERY

## 2022-07-29 PROCEDURE — 85027 COMPLETE CBC AUTOMATED: CPT

## 2022-07-29 PROCEDURE — 6360000002 HC RX W HCPCS: Performed by: ORTHOPAEDIC SURGERY

## 2022-07-29 PROCEDURE — 87635 SARS-COV-2 COVID-19 AMP PRB: CPT

## 2022-07-29 PROCEDURE — 6360000002 HC RX W HCPCS: Performed by: NURSE PRACTITIONER

## 2022-07-29 RX ORDER — ONDANSETRON 2 MG/ML
4 INJECTION INTRAMUSCULAR; INTRAVENOUS EVERY 6 HOURS PRN
Status: DISCONTINUED | OUTPATIENT
Start: 2022-07-29 | End: 2022-08-03 | Stop reason: HOSPADM

## 2022-07-29 RX ORDER — SODIUM CHLORIDE 9 MG/ML
INJECTION, SOLUTION INTRAVENOUS PRN
Status: CANCELLED | OUTPATIENT
Start: 2022-07-29

## 2022-07-29 RX ORDER — SENNA AND DOCUSATE SODIUM 50; 8.6 MG/1; MG/1
1 TABLET, FILM COATED ORAL 2 TIMES DAILY
Status: CANCELLED | OUTPATIENT
Start: 2022-07-29

## 2022-07-29 RX ORDER — CETIRIZINE HYDROCHLORIDE 10 MG/1
10 TABLET ORAL DAILY
Status: CANCELLED | OUTPATIENT
Start: 2022-07-30

## 2022-07-29 RX ORDER — LORAZEPAM 1 MG/1
1 TABLET ORAL 3 TIMES DAILY PRN
Status: DISCONTINUED | OUTPATIENT
Start: 2022-07-29 | End: 2022-08-03 | Stop reason: HOSPADM

## 2022-07-29 RX ORDER — GABAPENTIN 300 MG/1
300 CAPSULE ORAL 3 TIMES DAILY
Status: DISCONTINUED | OUTPATIENT
Start: 2022-07-29 | End: 2022-07-30

## 2022-07-29 RX ORDER — OXYCODONE HYDROCHLORIDE 5 MG/1
5 TABLET ORAL EVERY 4 HOURS PRN
Status: DISCONTINUED | OUTPATIENT
Start: 2022-07-29 | End: 2022-08-03 | Stop reason: HOSPADM

## 2022-07-29 RX ORDER — ACETAMINOPHEN 325 MG/1
650 TABLET ORAL EVERY 6 HOURS
Status: DISCONTINUED | OUTPATIENT
Start: 2022-07-30 | End: 2022-08-03 | Stop reason: HOSPADM

## 2022-07-29 RX ORDER — LORAZEPAM 1 MG/1
1 TABLET ORAL 3 TIMES DAILY PRN
Status: CANCELLED | OUTPATIENT
Start: 2022-07-29

## 2022-07-29 RX ORDER — CETIRIZINE HYDROCHLORIDE 10 MG/1
10 TABLET ORAL DAILY
Status: DISCONTINUED | OUTPATIENT
Start: 2022-07-30 | End: 2022-07-30

## 2022-07-29 RX ORDER — SODIUM CHLORIDE 0.9 % (FLUSH) 0.9 %
5-40 SYRINGE (ML) INJECTION EVERY 12 HOURS SCHEDULED
Status: CANCELLED | OUTPATIENT
Start: 2022-07-29

## 2022-07-29 RX ORDER — MORPHINE SULFATE 2 MG/ML
2 INJECTION, SOLUTION INTRAMUSCULAR; INTRAVENOUS EVERY 4 HOURS PRN
Status: DISCONTINUED | OUTPATIENT
Start: 2022-07-29 | End: 2022-07-29 | Stop reason: HOSPADM

## 2022-07-29 RX ORDER — SODIUM CHLORIDE 9 MG/ML
INJECTION, SOLUTION INTRAVENOUS PRN
Status: DISCONTINUED | OUTPATIENT
Start: 2022-07-29 | End: 2022-08-03 | Stop reason: HOSPADM

## 2022-07-29 RX ORDER — SENNA AND DOCUSATE SODIUM 50; 8.6 MG/1; MG/1
1 TABLET, FILM COATED ORAL 2 TIMES DAILY
Status: DISCONTINUED | OUTPATIENT
Start: 2022-07-29 | End: 2022-08-03 | Stop reason: HOSPADM

## 2022-07-29 RX ORDER — ONDANSETRON 4 MG/1
4 TABLET, ORALLY DISINTEGRATING ORAL EVERY 8 HOURS PRN
Status: CANCELLED | OUTPATIENT
Start: 2022-07-29

## 2022-07-29 RX ORDER — ONDANSETRON 2 MG/ML
4 INJECTION INTRAMUSCULAR; INTRAVENOUS EVERY 6 HOURS PRN
Status: CANCELLED | OUTPATIENT
Start: 2022-07-29

## 2022-07-29 RX ORDER — OXYCODONE HYDROCHLORIDE 5 MG/1
5 TABLET ORAL EVERY 4 HOURS PRN
Status: CANCELLED | OUTPATIENT
Start: 2022-07-29

## 2022-07-29 RX ORDER — KETOROLAC TROMETHAMINE 15 MG/ML
15 INJECTION, SOLUTION INTRAMUSCULAR; INTRAVENOUS EVERY 6 HOURS PRN
Status: DISCONTINUED | OUTPATIENT
Start: 2022-07-29 | End: 2022-08-03 | Stop reason: HOSPADM

## 2022-07-29 RX ORDER — ASPIRIN 81 MG/1
81 TABLET ORAL 2 TIMES DAILY
Status: CANCELLED | OUTPATIENT
Start: 2022-07-29

## 2022-07-29 RX ORDER — ROSUVASTATIN CALCIUM 10 MG/1
10 TABLET, COATED ORAL NIGHTLY
Status: DISCONTINUED | OUTPATIENT
Start: 2022-07-29 | End: 2022-07-30

## 2022-07-29 RX ORDER — ESCITALOPRAM OXALATE 10 MG/1
5 TABLET ORAL DAILY
Status: CANCELLED | OUTPATIENT
Start: 2022-07-30

## 2022-07-29 RX ORDER — ACETAMINOPHEN 325 MG/1
650 TABLET ORAL EVERY 6 HOURS
Status: CANCELLED | OUTPATIENT
Start: 2022-07-29

## 2022-07-29 RX ORDER — SODIUM CHLORIDE 0.9 % (FLUSH) 0.9 %
5-40 SYRINGE (ML) INJECTION EVERY 12 HOURS SCHEDULED
Status: DISCONTINUED | OUTPATIENT
Start: 2022-07-29 | End: 2022-08-03 | Stop reason: HOSPADM

## 2022-07-29 RX ORDER — GABAPENTIN 300 MG/1
300 CAPSULE ORAL 3 TIMES DAILY
Status: CANCELLED | OUTPATIENT
Start: 2022-07-29

## 2022-07-29 RX ORDER — KETOROLAC TROMETHAMINE 15 MG/ML
15 INJECTION, SOLUTION INTRAMUSCULAR; INTRAVENOUS EVERY 6 HOURS PRN
Status: DISCONTINUED | OUTPATIENT
Start: 2022-07-29 | End: 2022-07-29 | Stop reason: HOSPADM

## 2022-07-29 RX ORDER — MORPHINE SULFATE 2 MG/ML
2 INJECTION, SOLUTION INTRAMUSCULAR; INTRAVENOUS EVERY 4 HOURS PRN
Status: DISCONTINUED | OUTPATIENT
Start: 2022-07-29 | End: 2022-07-29

## 2022-07-29 RX ORDER — ASPIRIN 81 MG/1
81 TABLET ORAL 2 TIMES DAILY
Status: DISCONTINUED | OUTPATIENT
Start: 2022-07-29 | End: 2022-07-30

## 2022-07-29 RX ORDER — ONDANSETRON 4 MG/1
4 TABLET, ORALLY DISINTEGRATING ORAL EVERY 8 HOURS PRN
Status: DISCONTINUED | OUTPATIENT
Start: 2022-07-29 | End: 2022-08-03 | Stop reason: HOSPADM

## 2022-07-29 RX ORDER — ESCITALOPRAM OXALATE 10 MG/1
5 TABLET ORAL DAILY
Status: DISCONTINUED | OUTPATIENT
Start: 2022-07-30 | End: 2022-07-30

## 2022-07-29 RX ORDER — ROSUVASTATIN CALCIUM 5 MG/1
10 TABLET, COATED ORAL NIGHTLY
Status: CANCELLED | OUTPATIENT
Start: 2022-07-29

## 2022-07-29 RX ADMIN — GABAPENTIN 300 MG: 300 CAPSULE ORAL at 15:18

## 2022-07-29 RX ADMIN — ONDANSETRON 4 MG: 2 INJECTION INTRAMUSCULAR; INTRAVENOUS at 12:18

## 2022-07-29 RX ADMIN — ROSUVASTATIN CALCIUM 10 MG: 10 TABLET, FILM COATED ORAL at 21:47

## 2022-07-29 RX ADMIN — SENNOSIDES AND DOCUSATE SODIUM 1 TABLET: 50; 8.6 TABLET ORAL at 09:41

## 2022-07-29 RX ADMIN — ACETAMINOPHEN 650 MG: 325 TABLET ORAL at 06:03

## 2022-07-29 RX ADMIN — Medication 10 ML: at 21:49

## 2022-07-29 RX ADMIN — SENNOSIDES AND DOCUSATE SODIUM 1 TABLET: 50; 8.6 TABLET ORAL at 21:47

## 2022-07-29 RX ADMIN — ESCITALOPRAM OXALATE 5 MG: 10 TABLET ORAL at 09:41

## 2022-07-29 RX ADMIN — CEFAZOLIN 2000 MG: 10 INJECTION, POWDER, FOR SOLUTION INTRAVENOUS at 06:07

## 2022-07-29 RX ADMIN — GABAPENTIN 300 MG: 300 CAPSULE ORAL at 21:47

## 2022-07-29 RX ADMIN — ASPIRIN 81 MG: 81 TABLET, COATED ORAL at 21:47

## 2022-07-29 RX ADMIN — Medication 10 ML: at 09:42

## 2022-07-29 RX ADMIN — ONDANSETRON 4 MG: 2 INJECTION INTRAMUSCULAR; INTRAVENOUS at 02:55

## 2022-07-29 RX ADMIN — KETOROLAC TROMETHAMINE 15 MG: 15 INJECTION, SOLUTION INTRAMUSCULAR; INTRAVENOUS at 21:48

## 2022-07-29 RX ADMIN — TRANEXAMIC ACID 1950 MG: 650 TABLET ORAL at 06:03

## 2022-07-29 RX ADMIN — CETIRIZINE HYDROCHLORIDE 10 MG: 10 TABLET, FILM COATED ORAL at 09:40

## 2022-07-29 RX ADMIN — ASPIRIN 81 MG: 81 TABLET, COATED ORAL at 09:40

## 2022-07-29 RX ADMIN — GABAPENTIN 300 MG: 300 CAPSULE ORAL at 09:40

## 2022-07-29 ASSESSMENT — ENCOUNTER SYMPTOMS
SHORTNESS OF BREATH: 0
EYE REDNESS: 0
DIARRHEA: 0
CONSTIPATION: 1
COUGH: 0
BLOOD IN STOOL: 0
SORE THROAT: 0
BACK PAIN: 1
PHOTOPHOBIA: 0
VOMITING: 0
WHEEZING: 0
NAUSEA: 0
STRIDOR: 0
ABDOMINAL PAIN: 0
EYE PAIN: 0
SHORTNESS OF BREATH: 1

## 2022-07-29 ASSESSMENT — PAIN DESCRIPTION - LOCATION: LOCATION: HIP;LEG

## 2022-07-29 ASSESSMENT — PAIN DESCRIPTION - ORIENTATION: ORIENTATION: RIGHT

## 2022-07-29 ASSESSMENT — PAIN SCALES - GENERAL
PAINLEVEL_OUTOF10: 8
PAINLEVEL_OUTOF10: 6
PAINLEVEL_OUTOF10: 0
PAINLEVEL_OUTOF10: 0

## 2022-07-29 NOTE — PLAN OF CARE
Problem: Discharge Planning  Goal: Discharge to home or other facility with appropriate resources  7/29/2022 0512 by Robin Flores RN  Outcome: Progressing  7/29/2022 0508 by Robin Flores RN  Outcome: Progressing     Problem: Pain  Goal: Verbalizes/displays adequate comfort level or baseline comfort level  7/29/2022 0512 by Robin Flores RN  Outcome: Progressing  7/29/2022 0508 by Robin Flores RN  Outcome: Progressing     Problem: Safety - Adult  Goal: Free from fall injury  7/29/2022 0512 by Robin Flores RN  Outcome: Progressing  7/29/2022 0508 by Robin Flores RN  Outcome: Progressing     Problem: ABCDS Injury Assessment  Goal: Absence of physical injury  Outcome: Progressing     Problem: Nutrition Deficit:  Goal: Optimize nutritional status  Outcome: Progressing

## 2022-07-29 NOTE — CONSULTS
ARTHROPLASTY \". Events from the previous 24 hours reviewed    . Their inpatient work up has included:    Imaging:  Imaging and other studies reviewed and discussed with patient and staff    XR HIP RIGHT  7/28/2022  PROCEDURE: X-ray right hip INDICATION: Postoperative evaluation. COMPARISON: 7/20/2022 FINDINGS: Unremarkable alignment of the right hip prosthesis, no evidence of lucency surrounding the prosthesis. No evidence of cortical irregularity or lucency to suggest a fracture, no evidence of lytic or sclerotic bone lesion is seen. Mild degenerative bone changes are seen. The overlying soft tissues are unremarkable. Unremarkable alignment of the right hip prosthesis, no evidence of complications. XR HIP RIGHT  7/20/2022     Single frontal view of the right hip demonstrates reduction of the previously noted dislocation of the right hip prosthesis. No other significant new nor acute osseous lesion has developed. REDUCTION OF PREVIOUSLY NOTED DISLOCATION OF RIGHT HIP PROSTHESIS. XR HIP RIGHT  7/20/2022  EXAMINATION:  X-RAY RIGHT HIP. CLINICAL HISTORY:  RIGHT HIP DISLOCATION COMPARISONS:  9/9/2006. TECHNIQUE:  Frontal view pelvis. Frontal and crosstable lateral views right hip. FINDINGS:  There is a right hip prosthesis. There is superolateral dislocation of the femoral head component relative to the acetabular cup. Remaining osseous structures are otherwise grossly intact. There is no other fracture no destructive osseous lesion. Pelvic calcifications may represent phleboliths. RIGHT HIP ARTHROPLASTY. SUPERIOR DISLOCATION OF THE FEMORAL HEAD COMPONENT RELATIVE TO THE ACETABULAR CUP.              Labs:    Labs reviewed and discussed with patient and staff    No results found for: POCGLU  Lab Results   Component Value Date/Time     07/29/2022 05:43 AM    K 4.1 07/29/2022 05:43 AM     07/29/2022 05:43 AM    CO2 23 07/29/2022 05:43 AM    BUN 14 07/29/2022 05:43 AM    CREATININE 0.64 07/29/2022 05:43 AM    CALCIUM 8.7 07/29/2022 05:43 AM    LABALBU 4.1 07/20/2022 09:15 AM    LABALBU 4.7 09/29/2011 10:44 AM    BILITOT 0.3 07/20/2022 09:15 AM    ALKPHOS 76 07/20/2022 09:15 AM    AST 18 07/20/2022 09:15 AM    ALT 13 07/20/2022 09:15 AM     Lab Results   Component Value Date/Time    WBC 5.0 07/29/2022 05:42 AM    RBC 3.59 07/29/2022 05:42 AM    RBC 4.21 09/29/2011 10:44 AM    HGB 10.4 07/29/2022 05:42 AM    HCT 31.4 07/29/2022 05:42 AM    MCV 87.5 07/29/2022 05:42 AM    MCH 29.0 07/29/2022 05:42 AM    MCHC 33.2 07/29/2022 05:42 AM    RDW 13.5 07/29/2022 05:42 AM     07/29/2022 05:42 AM    MPV 9.6 04/06/2015 12:08 PM     Lab Results   Component Value Date/Time    VITD25 19.8 05/13/2017 09:48 AM     Lab Results   Component Value Date/Time    COLORU Yellow 07/27/2022 01:42 PM    NITRU Negative 07/27/2022 01:42 PM    GLUCOSEU Negative 07/27/2022 01:42 PM    KETUA TRACE 07/27/2022 01:42 PM    UROBILINOGEN 0.2 07/27/2022 01:42 PM    BILIRUBINUR Negative 07/27/2022 01:42 PM    BILIRUBINUR small 03/09/2021 02:00 PM     Lab Results   Component Value Date/Time    PROTIME 13.3 07/20/2022 09:15 AM    PROTIME 9.7 09/30/2011 08:25 PM     Lab Results   Component Value Date/Time    INR 1.0 07/20/2022 09:15 AM         I discussed results with patient.     Current Rehabilitation Assessments:    Rehabilitation:  Physical Therapy  Bed mobility:  Bed mobility  Supine to Sit: Stand by assistance (07/29/22 1038)  Sit to Supine: Stand by assistance (07/29/22 1038)  Scooting: Minimal assistance (07/28/22 1813)  Bed Mobility Comments: vc's for maintaining hip precautions (07/29/22 1038)  Transfers:  Transfers  Sit to Stand: Stand by assistance (07/29/22 1044)  Stand to sit: Stand by assistance (07/29/22 1044)  Bed to Chair: Stand by assistance (to [facility] with ww) (07/28/22 1813)  Car Transfer:  (pt declines to attempt states she has the technique down.) (07/29/22 1044)  Comment: vc's for maintaining hip precautions, pt with inconsistent follow through, written handout given pt still inconsistent despite heavy education. (07/29/22 1044)  Gait:   Ambulation  Surface: level tile (07/29/22 1046)  Device: Diamante Pitch (07/29/22 1046)  Assistance: Stand by assistance (07/29/22 1046)  Quality of Gait: antalgic step through pattern, pt needing cues to avoid pivoting on RLE with change of direction. (07/29/22 1046)  Gait Deviations: Slow Francoise (07/29/22 1046)  Distance: 25' x2 (07/29/22 1046)  Comments: distance not the focus of tx. (07/29/22 1046)  Stairs:  Stairs/Curb  Stairs?: Yes (07/29/22 1046)  Stairs  # Steps : 12 (07/29/22 1046)  Stairs Height: 6\" (07/29/22 1046)  Rails: Bilateral (07/29/22 1046)  Device: No Device (07/29/22 1046)  Assistance: Supervision (07/29/22 1046)  Comment: vc's for proper sequencing, good carryover. (07/29/22 1046)  W/C mobility:         Occupational therapy:   Hand Dominance: Right  ADL  Feeding: Independent (07/29/22 1111)  Grooming: Setup (07/29/22 1111)  UE Bathing: Setup (07/29/22 1111)  LE Bathing: Moderate assistance (07/29/22 1111)  UE Dressing: Setup (07/29/22 1111)  LE Dressing: Moderate assistance (07/29/22 1111)  Toileting: Minimal assistance (07/29/22 1111)  Additional Comments: Simulated ADLs as above. Pt. limited d/t pain and hip precautions. Limited d/t fatigue and weakness, requires increased time and effort for all weight shifting. Mild decreased static standing balance which impacts clothing management for toileting and bathing. (07/29/22 1111)  Toilet Transfers  Toilet - Technique: Ambulating (07/29/22 1113)  Equipment Used: Standard bedside commode (07/29/22 1113)  Toilet Transfer: Contact guard assistance (07/29/22 1113)  Toilet Transfers Comments: CGA for standing, SBA to sit, verbal cues for hip precautions (07/29/22 1113)            Speech therapy:            Diet/Swallow:                         COGNITION  OT:    SP:              Re-evals pending      Past Medical History: Diagnosis Date    Acquired hypothyroidism 7/29/2022    Allergic rhinitis, Dr Vern White 10/28/2013    Anxiety     Back pain     Bilateral carotid artery stenosis 7/29/2022    CAD (coronary artery disease)     Cat bite involving extremity 11/5/2013    Cat bite involving extremity 11/5/2013    Cellulitis 11/5/2013    Cellulitis, improving 11/5/2013    Cervical radiculitis 1/13/2014    Cervical radiculitis, surgery pending 1/13/2014    COPD (chronic obstructive pulmonary disease) ([facility] [address] 1/3/2017    CTS (carpal tunnel syndrome), Ortho 1/13/2014    Depressive disorder 3/25/2022    Dyslipidemia 10/28/2013    Fatigue 10/7/2013    Headache(784.0)     H/O MIGRANES    Hernia, hiatal     H/O     Hx of cardiac cath x 2, normal, last 9/13 10/7/2013    Osteoarthritis     S/P cervical spinal fusion, 5-6 1/13/2014    Seizure disorder ([address]     Seizure disorder ([facility] [address]     Sinusitis 10/28/2013    Tobacco abuse, quit 9/3/13 10/7/2013    Tobacco abuse, quit 9/3/13          PastSurgical History:        Procedure Laterality Date    APPENDECTOMY      CARDIAC CATHETERIZATION  09/05/2013     [address]    fusion    CHOLECYSTECTOMY      COLONOSCOPY  2009    COSMETIC SURGERY  2010    on eye    JOINT REPLACEMENT Right 08/09/2021    hip    MS TOTAL KNEE ARTHROPLASTY Left 12/05/2017    LEFT TOTAL KNEE ARTHROPLASTY SUPINE BELEN PSI SPINAL (OUTSIDE PAT DR. OLIVA) performed by Tara Luciano MD at [address] Right 7/28/2022    RIGHT HIP REVISION TOTAL HIP ARTHROPLASTY performed by Dariana Mcdaniels MD at [address] [place] [address]         Allergies: Allergies   Allergen Reactions    Doxycycline     Ibandronic Acid     Keflex [Cephalexin] Nausea Only    Lidocaine     Nickel     Prednisone      Other reaction(s):  Other: See Comments  Pt. had chest pain and red facial rash      Shellfish-Derived Products     Simvastatin      Other reaction(s): Unknown    Tetracyclines & Related Ultram [Tramadol Hcl]     Augmentin [Amoxicillin-Pot Clavulanate] Nausea And Vomiting          CurrentMedications:   Current Facility-Administered Medications: ketorolac (TORADOL) injection 15 mg, 15 mg, IntraVENous, Q6H PRN  morphine (PF) injection 2 mg, 2 mg, IntraVENous, Q4H PRN  lactated ringers infusion, , IntraVENous, Continuous  lactated ringers infusion, , IntraVENous, Continuous  sodium chloride flush 0.9 % injection 5-40 mL, 5-40 mL, IntraVENous, 2 times per day  sodium chloride flush 0.9 % injection 5-40 mL, 5-40 mL, IntraVENous, PRN  0.9 % sodium chloride infusion, , IntraVENous, PRN  acetaminophen (TYLENOL) tablet 650 mg, 650 mg, Oral, Q6H  oxyCODONE (ROXICODONE) immediate release tablet 5 mg, 5 mg, Oral, Q4H PRN  ondansetron (ZOFRAN-ODT) disintegrating tablet 4 mg, 4 mg, Oral, Q8H PRN **OR** ondansetron (ZOFRAN) injection 4 mg, 4 mg, IntraVENous, Q6H PRN  sennosides-docusate sodium (SENOKOT-S) 8.6-50 MG tablet 1 tablet, 1 tablet, Oral, BID  aspirin EC tablet 81 mg, 81 mg, Oral, BID  escitalopram (LEXAPRO) tablet 5 mg, 5 mg, Oral, Daily  LORazepam (ATIVAN) tablet 1 mg, 1 mg, Oral, TID PRN  gabapentin (NEURONTIN) capsule 300 mg, 300 mg, Oral, TID  rosuvastatin (CRESTOR) tablet 10 mg, 10 mg, Oral, Nightly  cetirizine (ZYRTEC) tablet 10 mg, 10 mg, Oral, Daily  phenol 1.4 % mouth spray 1 spray, 1 spray, Mouth/Throat, Q4H PRN      Social History:  Social History     Socioeconomic History    Marital status: Single     Spouse name: Not on file    Number of children: 3    Years of education: Not on file    Highest education level: Not on file   Occupational History    Occupation: Homemaker   Tobacco Use    Smoking status: Former     Packs/day: 0.50     Years: 20.00     Pack years: 10.00     Types: Cigarettes     Quit date: 2013     Years since quittin.8    Smokeless tobacco: Never   Vaping Use    Vaping Use: Every day   Substance and Sexual Activity    Alcohol use: No    Drug use: Never    Sexual Eyes:  Negative for photophobia, pain and redness. Respiratory:  Positive for shortness of breath. Negative for cough, wheezing and stridor. Shortness of breath on exertion   Cardiovascular:  Negative for chest pain, palpitations and leg swelling. Gastrointestinal:  Positive for constipation. Negative for abdominal pain, blood in stool, diarrhea, nausea and vomiting. Endocrine: Negative for polydipsia. Genitourinary:  Negative for dysuria, flank pain, frequency, hematuria and urgency. Musculoskeletal:  Positive for back pain, gait problem and myalgias. Negative for neck pain. Skin:  Negative for rash. Allergic/Immunologic: Positive for immunocompromised state. Negative for environmental allergies. Neurological:  Positive for weakness. Negative for dizziness, tremors, seizures and headaches. Hematological:  Does not bruise/bleed easily. Psychiatric/Behavioral:  Negative for hallucinations and suicidal ideas. The patient is not nervous/anxious. Physical Exam:  BP (!) 118/56   Pulse 58   Temp 98.2 °F (36.8 °C) (Oral)   Resp 18   LMP  (LMP Unknown)   SpO2 98%      Physical Exam  Constitutional:       General: She is not in acute distress. Appearance: She is well-developed. She is ill-appearing. She is not toxic-appearing or diaphoretic. HENT:      Head: Normocephalic. Not macrocephalic and not microcephalic. No raccoon eyes or Eugene's sign. Mouth/Throat:      Pharynx: Oropharyngeal exudate present. Eyes:      General: No scleral icterus. Right eye: No discharge. Left eye: No discharge. Conjunctiva/sclera: Conjunctivae normal.      Pupils: Pupils are equal, round, and reactive to light. Neck:      Thyroid: No thyromegaly. Vascular: Normal carotid pulses. No carotid bruit, hepatojugular reflux or JVD. Trachea: No tracheal deviation. Pulmonary:      Effort: Pulmonary effort is normal.      Breath sounds: No stridor.  Decreased breath sounds present. Musculoskeletal:      Cervical back: Normal range of motion. Tenderness present. Spinous process tenderness and muscular tenderness present. Thoracic back: Tenderness present. Decreased range of motion. Lumbar back: Tenderness present. Decreased range of motion. Skin:     General: Skin is warm and dry. Coloration: Skin is pale. Findings: Bruising present. Comments: Old IV sites   Neurological:      Sensory: Sensory deficit present. Coordination: Coordination abnormal.      Gait: Gait abnormal.      Deep Tendon Reflexes:      Reflex Scores:       Tricep reflexes are 1+ on the right side and 1+ on the left side. Bicep reflexes are 1+ on the right side and 1+ on the left side. Brachioradialis reflexes are 1+ on the right side and 1+ on the left side. Patellar reflexes are 0 on the right side and 0 on the left side. Achilles reflexes are 0 on the right side and 0 on the left side. Psychiatric:         Attention and Perception: She is attentive. Mood and Affect: Mood is not anxious or depressed. Affect is not angry. Speech: Speech is delayed. Speech is not rapid and pressured. Behavior: Behavior is slowed. Behavior is not withdrawn. Thought Content: Thought content normal.         Cognition and Memory: Memory is not impaired. She exhibits impaired recent memory. She does not exhibit impaired remote memory. Judgment: Judgment is not impulsive or inappropriate. Right Hip Exam     Tenderness   The patient is experiencing tenderness in the anterior and greater trochanter. Range of Motion   Abduction:  abnormal   Adduction:  abnormal   Extension:  abnormal   Flexion:  abnormal   External rotation:  abnormal   Internal rotation:  abnormal     Comments:  RIGHT HIP REVISION TOTAL HIP ARTHROPLASTY        Neurologic Exam     Mental Status   Level of consciousness: alert  Knowledge: good.      Cranial Nerves     CN III, IV, VI   Pupils are equal, round, and reactive to light. Gait, Coordination, and Reflexes     Reflexes   Right brachioradialis: 1+  Left brachioradialis: 1+  Right biceps: 1+  Left biceps: 1+  Right triceps: 1+  Left triceps: 1+  Right patellar: 0  Left patellar: 0  Right achilles: 0  Left achilles: 0          Diagnostics:    Recent Results (from the past 24 hour(s))   CBC    Collection Time: 07/29/22  5:42 AM   Result Value Ref Range    WBC 5.0 4.8 - 10.8 K/uL    RBC 3.59 (L) 4.20 - 5.40 M/uL    Hemoglobin 10.4 (L) 12.0 - 16.0 g/dL    Hematocrit 31.4 (L) 37.0 - 47.0 %    MCV 87.5 82.0 - 100.0 fL    MCH 29.0 27.0 - 31.3 pg    MCHC 33.2 33.0 - 37.0 %    RDW 13.5 11.5 - 14.5 %    Platelets 869 199 - 767 K/uL   Basic Metabolic Panel w/ Reflex to MG    Collection Time: 07/29/22  5:43 AM   Result Value Ref Range    Sodium 140 135 - 144 mEq/L    Potassium reflex Magnesium 4.1 3.4 - 4.9 mEq/L    Chloride 106 95 - 107 mEq/L    CO2 23 20 - 31 mEq/L    Anion Gap 11 9 - 15 mEq/L    Glucose 109 (H) 70 - 99 mg/dL    BUN 14 8 - 23 mg/dL    Creatinine 0.64 0.50 - 0.90 mg/dL    GFR Non-African American >60.0 >60    GFR  >60.0 >60    Calcium 8.7 8.5 - 9.9 mg/dL              Impression:    Impaired mobility and ADLs due to RIGHT HIP REVISION TOTAL HIP ARTHROPLASTY  Factors favoring recovery include:  near independent premorbid function        Complex Active General Medical Issues that complicate care and require daily medical supervision: 1. Principal Problem:    Status post total replacement of right hip  Active Problems:    Impaired mobility and activities of daily living    CAD (coronary artery disease)    COPD (chronic obstructive pulmonary disease) (HCC)  Resolved Problems:    * No resolved hospital problems.  *            Recommendations:    Considering all of the factors above including the patient's current medical status, social status/home environment, their functional needs, and their ability to participate in a therapy program, I feel that they would best be served at:    acute intensive comprehensive inpatient rehabilitation program.  Due to the diagnoses above the patient has had significant decline in function and is now requiring acute intensive therapy to optimize function. They are expected to achieve meaningful functional gains. Due to medical complexity as above, rehabilitation physician services is reasonable and necessary including face-to-face visits at least 3 days/week with anticipated need to treat, manage and modify the rehabilitation course of treatment including interdisciplinary team conferences. They will require rehab physician care to monitor for neurogenic bowel bladder, postoperative pain, titration of opiate and high risk medications, Post-Operative Day: 1 Status Post right Hip Arthroplasty  Systemic or Specific Complaints:No Complaints blood pressure and blood sugar control. It is my opinion that they will be able to tolerate and benefit from 3 hours of therapy a day. I reviewed the various options re: levels of care with the patient and family. Please see pre-admission screen note for further details. I discussed acute rehab with the patient and verify that the patient is able and willing to participate in 3 hours of therapy a day. Rehab and Acute Care Case Management has also reinforced this expectation. This patient requires multidisciplinary rehabilitation treatment, including daily care and management from a PM&R physician, 24-hour rehabilitation nursing, Physical Therapy, Occupational Therapy, rehabilitation psychology, consideration of speech and language pathology, recreational therapy, nutritional services, and a rehabilitation . I feel that it is reasonable to plan for a discharge to home setting after acute rehab. Specialized nursing care to focus on:   Bowel and bladder issues-Monitor for urinary retention-check PVRs, bladder scan--cath if no void. Wound management re RIGHT HIP REVISION TOTAL HIP ARTHROPLASTY -pressure relief protocols-side to side turns  IV medication administration  IVF prn    Monitor endurance and if necessary spread therapy out over a 7-day window-adding scheduled rest breaks when needed. Focus on energy conservation. Monitor heart rate and   cardiac medications effects on heart rate and blood pressure before, during and after therapy. Progress toward endurance training with pulse ox monitoring for saturation and heart rate. continue to monitor closely for dehydration-- Improve hydration and nutrition by adding Vitamin B12 shot times one, adding Protein supplements and push PO fluids. Treat and monitor for higher level cognitive deficits, focus on difficulty with sequencing and problem-solving. Focus on higher-level balance and falls risk issues focusing on balance training and monitoring for orthostasis. Above recommendations are indicated to address medical complexity and need for appropriate rehab services. Will tailor individual care and rehab plan per individuals needs re  pain management. Focus of today's plan-   transition to acute rehab      Required Certification Data (potential inpatient rehabilitation facility patient's only)    Deficits:nutrition, mobility, impaired gait, pain limiting function, and wound healing     Disability:mobility, locomotion, self care, and bowel/ bladder status    Potential barriers to progress/discharge:severe pain and weight baring status         It was my pleasure to evaluate Sivakumar Alva today. Please call [phone] with questions.     Kurt Medina, DO

## 2022-07-29 NOTE — PROGRESS NOTES
Hip surgery    Progress Note    Subjective:     Post-Operative Day: 1 Status Post right Hip Arthroplasty  Systemic or Specific Complaints:No Complaints    Objective:     CURRENT VITALS:  BP (!) 123/55   Pulse 61   Temp 97.5 °F (36.4 °C) (Oral)   Resp 18   LMP  (LMP Unknown)   SpO2 97%     General: alert, appears stated age, and cooperative   Wound: Wound clean and dry no evidence of infection. Motion: Painless Range of Motion   DVT Exam: No evidence of DVT seen on physical exam.       NVI in lower extremity. Thigh swollen but soft. Moving foot and ankle. Data Review  Recent Labs     07/29/22  0542   WBC 5.0   RBC 3.59*   HGB 10.4*   HCT 31.4*   MCV 87.5   MCH 29.0   MCHC 33.2   RDW 13.5          Assessment:     Status Post right Hip Arthroplasty. Doing well postoperatively.      Plan:      1: Continues current post-op course :  2:  Continue Deep venous thrombosis prophylaxis  3:  Continue physical therapy  4:  Continue Pain Control

## 2022-07-29 NOTE — CARE COORDINATION
Inpatient Rehab referral received. Met with patient, son Ania Arredondo at bedside, explained [facility] Acute Inpatient Rehab program and requirements, including 3 hours of intense therapy daily, anticipated length of stay and goal of discharge to home. All questions answered and patient verbalized understanding. Freedom of choice provided and patient requests admit to [facility] if approved. PM&R consult pending. Patient lives alone, 2 story home with 3 steps to enter and was Independent prior to admission. Son is local.   Per discussion, patient had Right hip replaced about a year ago and has dislocated 2 times since. Patient is highly motivated to get home and does feel she would do better with short stay on Rehab so that she can return home alone safely. Electronically signed by Usha Dennison RN on 7/29/22 at 11:53 AM EDT    PM&R consult completed with recommendation for ARU. Patient can transfer to [address] [place] [address]. 255 today pending medical clearance and negative covid. [id number] notified.  Electronically signed by Usha Dennison RN on 7/29/22 at 12:39 PM EDT

## 2022-07-29 NOTE — PLAN OF CARE
See OT evaluation for all goals and OT POC.  Electronically signed by NELSY Rooney on 7/29/2022 at 11:24 AM

## 2022-07-29 NOTE — PROGRESS NOTES
Physical Therapy Med Surg Daily Treatment Note  Facility/Department: [facility]  Room: [place] [id number]       NAME: Shruti Lopez  : 1950 (78 y.o.)  MRN: [id number]  CODE STATUS: Full Code    Date of Service: 2022    Patient Diagnosis(es): Instability of hip joint, right [M25.351]  Status post total replacement of right hip [Z96.641]   No chief complaint on file.     Patient Active Problem List    Diagnosis Date Noted    Status post total replacement of right hip 2022    Cervical spine disease 2018    Migraine 2018    Right leg numbness 2018    Osteoarthritis of left knee 2017    COPD (chronic obstructive pulmonary disease) ([facility] [address] 2017    DDD (degenerative disc disease), lumbar 2016    PADMINI (generalized anxiety disorder) 2015    History of tobacco use 2014    S/P cervical spinal fusion, 5-6 2014    CTS (carpal tunnel syndrome), Ortho 2014    Cervical radiculitis, surgery pending 2014    CAD (coronary artery disease) 2014    Cat bite involving extremity 2013    Cellulitis, improving 2013    Allergic rhinitis, Dr Clarita Martinez 10/28/2013    Dyslipidemia 10/28/2013    Hx of cardiac cath x 2, normal, last 9/13 10/07/2013    Fatigue 10/07/2013    Postlaminectomy syndrome, cervical region 2013    Family history of heart disease 2013    Osteoarthritis     Headache     Hernia, hiatal     Anxiety     Myalgia 10/16/2009    Other symptoms referable to back 2007        Past Medical History:   Diagnosis Date    Allergic rhinitis, Dr Clarita Martinez 10/28/2013    Anxiety     Back pain     CAD (coronary artery disease)     Cat bite involving extremity 2013    Cat bite involving extremity 2013    Cellulitis 2013    Cellulitis, improving 2013    Cervical radiculitis 2014    Cervical radiculitis, surgery pending 2014    COPD (chronic obstructive pulmonary disease) ([facility] [address] 1/3/2017    CTS (carpal tunnel syndrome), Ortho 1/13/2014    Dyslipidemia 10/28/2013    Fatigue 10/7/2013    Headache(784.0)     H/O MIGRANES    Hernia, hiatal     H/O     Hx of cardiac cath x 2, normal, last 9/13 10/7/2013    Osteoarthritis     S/P cervical spinal fusion, 5-6 1/13/2014    Seizure disorder ([address]     Seizure disorder ([address]     Sinusitis 10/28/2013    Tobacco abuse, quit 9/3/13 10/7/2013    Tobacco abuse, quit 9/3/13      Past Surgical History:   Procedure Laterality Date    APPENDECTOMY      CARDIAC CATHETERIZATION  09/05/2013     [address]    fusion    CHOLECYSTECTOMY      COLONOSCOPY  2009    COSMETIC SURGERY  2010    on eye    JOINT REPLACEMENT Right 08/09/2021    hip    TX TOTAL KNEE ARTHROPLASTY Left 12/05/2017    LEFT TOTAL KNEE ARTHROPLASTY SUPINE BELEN PSI SPINAL (OUTSIDE PAT DR. OLIVA) performed by Bryan Barnes MD at [address] Right 7/28/2022    RIGHT HIP REVISION TOTAL HIP ARTHROPLASTY performed by Sushant Vazquez MD at [facility]       Chart Reviewed: Yes  Family / Caregiver Present: No  General Comment  Comments: pt unable to recall hip precautions, vc's and demonstration provided. Restrictions:  Restrictions/Precautions: Weight Bearing  Lower Extremity Weight Bearing Restrictions  Right Lower Extremity Weight Bearing: Weight Bearing As Tolerated  Left Lower Extremity Weight Bearing: Weight Bearing As Tolerated  Position Activity Restriction  Hip Precautions: Posterior hip precautions    SUBJECTIVE:   Subjective: \"I have popped it out twice, I don't want to do it again. \"    Pain  Pain: 4/10 R hip.      OBJECTIVE:        Bed mobility  Supine to Sit: Stand by assistance  Sit to Supine: Stand by assistance  Bed Mobility Comments: vc's for maintaining hip precautions    Transfers  Sit to Stand: Stand by assistance  Stand to sit: Stand by assistance  Car Transfer:  (pt declines to attempt states she has the technique Definitions for assistance levels  Independent = pt does not require any physical supervision or assistance from another person for activity completion. Device may be needed.   Stand by assistance = pt requires verbal cues or instructions from another person, close to but not touching, to perform the activity  Minimal assistance= pt performs 75% or more of the activity; assistance is required to complete the activity  Moderate assistance= pt performs 50% of the activity; assistance is required to complete the activity  Maximal assistance = pt performs 25% of the activity; assistance is required to complete the activity  Dependent = pt requires total physical assistance to accomplish the task

## 2022-07-29 NOTE — CARE COORDINATION
Cervical radiculitis 1/13/2014    Cervical radiculitis, surgery pending 1/13/2014    COPD (chronic obstructive pulmonary disease) ([facility] [address] 1/3/2017    CTS (carpal tunnel syndrome), Ortho 1/13/2014    Depressive disorder 3/25/2022    Dyslipidemia 10/28/2013    Fatigue 10/7/2013    Headache(784.0)     H/O MIGRANES    Hernia, hiatal     H/O     Hx of cardiac cath x 2, normal, last 9/13 10/7/2013    Osteoarthritis     S/P cervical spinal fusion, 5-6 1/13/2014    Seizure disorder ([address]     Seizure disorder ([facility] [address]     Sinusitis 10/28/2013    Tobacco abuse, quit 9/3/13 10/7/2013    Tobacco abuse, quit 9/3/13      Past Surgical History:   Procedure Laterality Date    APPENDECTOMY      CARDIAC CATHETERIZATION  09/05/2013     [address]    fusion    CHOLECYSTECTOMY      COLONOSCOPY  2009    COSMETIC SURGERY  2010    on eye    JOINT REPLACEMENT Right 08/09/2021    hip    HI TOTAL KNEE ARTHROPLASTY Left 12/05/2017    LEFT TOTAL KNEE ARTHROPLASTY SUPINE BELEN PSI SPINAL (OUTSIDE PAT DR. OLIVA) performed by Sherron Guillermo MD at [address] Right 7/28/2022    RIGHT HIP REVISION TOTAL HIP ARTHROPLASTY performed by Derrick Lake MD at [address] Directives:  Advance Directives       Power of  Living Will ACP-Advance Directive ACP-Power of     Not on File Not on File Not on File Not on File            Labs/Infection Control:  Recent Labs     07/29/22  0542 07/29/22  0543   WBC 5.0  --    HGB 10.4*  --    HCT 31.4*  --      --    BUN  --  14   CREATININE  --  0.64   GLUCOSE  --  109*   NA  --  140   K  --  4.1   CALCIUM  --  8.7      Blood cultures:  No results for input(s): BC in the last 72 hours.     Urinalysis/C&S:  Recent Labs     07/27/22  1342   WBCUA 0-2   RBCUA 0-2   BACTERIA Negative   LEUKOCYTESUR TRACE*   BLOODU Negative   GLUCOSEU Negative   KETUA TRACE*       Radiology:  XR HIP RIGHT (1 VIEW)  Result Date: 7/28/2022  Unremarkable alignment of the right hip prosthesis, no evidence of complications. XR HIP RIGHT (1 VIEW)  Result Date: 7/20/2022  REDUCTION OF PREVIOUSLY NOTED DISLOCATION OF RIGHT HIP PROSTHESIS. XR HIP RIGHT (2-3 VIEWS)  Result Date: 7/20/2022  RIGHT HIP ARTHROPLASTY. SUPERIOR DISLOCATION OF THE FEMORAL HEAD COMPONENT RELATIVE TO THE ACETABULAR CUP. Medications/IV's:  The patient is currently on aspirin for DVT prophylaxis. Scheduled:    sodium chloride flush, 5-40 mL, IntraVENous, 2 times per day    acetaminophen, 650 mg, Oral, Q6H    sennosides-docusate sodium, 1 tablet, Oral, BID    aspirin, 81 mg, Oral, BID    escitalopram, 5 mg, Oral, Daily    gabapentin, 300 mg, Oral, TID    rosuvastatin, 10 mg, Oral, Nightly    cetirizine, 10 mg, Oral, Daily    PRN:  ketorolac, morphine, sodium chloride flush, sodium chloride, oxyCODONE, ondansetron **OR** ondansetron, LORazepam, phenol    Allergies: Allergies   Allergen Reactions    Doxycycline     Ibandronic Acid     Keflex [Cephalexin] Nausea Only    Lidocaine     Nickel     Prednisone      Other reaction(s): Other: See Comments  Pt. had chest pain and red facial rash      Shellfish-Derived Products     Simvastatin      Other reaction(s): Unknown    Tetracyclines & Related     Ultram [Tramadol Hcl]     Augmentin [Amoxicillin-Pot Clavulanate] Nausea And Vomiting         Most Recent Vitals, Height and Weight  BP (!) 118/56   Pulse 58   Temp 97.5 °F (36.4 °C) (Oral)   Resp 18   LMP  (LMP Unknown)   SpO2 98%     Weight Bearing Restrictions:   Lower Extremity Weight Bearing Restrictions  Right Lower Extremity Weight Bearing: Weight Bearing As Tolerated  Left Lower Extremity Weight Bearing: Weight Bearing As Tolerated     Current Diet Order: ADULT DIET;  Regular    Skin: Surgical incision Right outer thigh      Lungs: wdl         Cognition and Behavior:  Language Preference (if other than English): Alertness/Behavior  Neuro (WDL): Within Defined Limits  Level of Consciousness: Alert (0)  History of Falling: No      History of Falling: No          Prior Level of Function and Living Arrangements:  Social/Functional History  Lives With: Alone  Type of Home: House  Home Layout: Two level, Able to Live on Main level with bedroom/bathroom, Bed/Bath upstairs (has BSC and couch on first level if needed)  Home Access: Stairs to enter with rails  Entrance Stairs - Number of Steps: 3 with one rail: 12 to second level - +4 more  Bathroom Shower/Tub: Tub/Shower unit  Bathroom Equipment: Grab bars in shower, Hand-held shower  Home Equipment: Lenoard John, rolling, Cane, Long-handled shoehorn  Has the patient had two or more falls in the past year or any fall with injury in the past year?:  (Has dislocated hip x2)  ADL Assistance: Independent  Homemaking Assistance: Independent  Ambulation Assistance: Independent (with ww)  Transfer Assistance: Independent  Active : Yes (Son has been assisting and friend can assist)  Additional Comments: has 3 children - one does not work - pt states she has a friend that can stay with her initially and she will stay on first level if d/c is to home  Dental Appliances: Uppers  Vision - Corrective Lenses: None  Hearing Aid: None  Personal Equipment:   Dental Appliances: Uppers  Vision - Corrective Lenses: None  Hearing Aid: None      CURRENT FUNCTIONAL LEVEL:  Physical Therapy  Bed mobility:  Bed mobility  Supine to Sit: Stand by assistance (07/29/22 1038)  Sit to Supine: Stand by assistance (07/29/22 1038)  Scooting: Minimal assistance (07/28/22 1813)  Bed Mobility Comments: vc's for maintaining hip precautions (07/29/22 1038)  Transfers:  Transfers  Sit to Stand: Stand by assistance (07/29/22 1044)  Stand to sit: Stand by assistance (07/29/22 1044)  Bed to Chair: Stand by assistance (to [facility] with ww) (07/28/22 1813)  Car Transfer:  (pt declines to attempt states she has the technique down.) (07/29/22 1044)  Comment: vc's for maintaining hip precautions, pt with inconsistent follow through, written handout given pt still inconsistent despite heavy education. (07/29/22 1044)  Gait:   Ambulation  Surface: level tile (07/29/22 1046)  Device: Dareen Born (07/29/22 1046)  Assistance: Stand by assistance (07/29/22 1046)  Quality of Gait: antalgic step through pattern, pt needing cues to avoid pivoting on RLE with change of direction. (07/29/22 1046)  Gait Deviations: Slow Francoise (07/29/22 1046)  Distance: 25' x2 (07/29/22 1046)  Comments: distance not the focus of tx. (07/29/22 1046)  Stairs:  Stairs/Curb  Stairs?: Yes (07/29/22 1046)  Stairs  # Steps : 12 (07/29/22 1046)  Stairs Height: 6\" (07/29/22 1046)  Rails: Bilateral (07/29/22 1046)  Device: No Device (07/29/22 1046)  Assistance: Supervision (07/29/22 1046)  Comment: vc's for proper sequencing, good carryover. (07/29/22 1046)  W/C mobility:         Occupational Therapy  Hand Dominance: Right  ADL  Feeding: Independent (07/29/22 1111)  Grooming: Setup (07/29/22 1111)  UE Bathing: Setup (07/29/22 1111)  LE Bathing: Moderate assistance (07/29/22 1111)  UE Dressing: Setup (07/29/22 1111)  LE Dressing: Moderate assistance (07/29/22 1111)  Toileting: Minimal assistance (07/29/22 1111)  Additional Comments: Simulated ADLs as above. Pt. limited d/t pain and hip precautions. Limited d/t fatigue and weakness, requires increased time and effort for all weight shifting.  Mild decreased static standing balance which impacts clothing management for toileting and bathing. (07/29/22 1111)  Toilet Transfers  Toilet - Technique: Ambulating (07/29/22 1113)  Equipment Used: Standard bedside commode (07/29/22 1113)  Toilet Transfer: Contact guard assistance (07/29/22 1113)  Toilet Transfers Comments: CGA for standing, SBA to sit, verbal cues for hip precautions (07/29/22 1113)            Speech Language Pathology n/a      Current Conditions Requiring Inpatient Rehabilitation  Bowel/Bladder Dysfunction: Yes  Intervention Required = Frequent toileting  Risk for Medical/Clinical Complications = moderate  Skin Healing/Breakdown Risk: Yes  Intervention Required = Side to side turns, Surgical incision, and Monitor for S/S of infection  Risk for Medical/Clinical Complications = high  Nutrition/Hydration Deficiency: Yes  Intervention Required = Monitor I&Os and Check Labs  Risk for Medical/Clinical Complications = low  Medical Comorbidities: Yes  Intervention Required = DVT risk, CAD, and COPD  Risk for Medical/Clinical Complications = high    Rehab/Skilled Needs:   3 hours of Intensive Acute Rehab therapy daily, 5 days/week for a total of 900 minutes  PT Treatment Time:  1.5 hrs/day  OT Treatment Time: 1.5 hrs/day  Rehabilitation Nursing   Case management/Social work    Cultural needs:     none  Funding needs:     none    Expected Level of Improvement with Rehab  Assist for ADL Modified [place]  Assist for Transfers [place]  Assist for Gait Modified [place]    Patient's willingness to participate: Yes  Patient's ability to tolerate proposed care: Yes  Patient/Family Goals of Rehab (in patient's/family's own words): get stronger and return home alone and independent      Anticipated Discharge Plan:  Home with   [address] [place] [address] Wood De Gaulle, RN PT OT Aide to be determined      Barriers to Discharge:  Home entry accessibility  Multi-level home  Caregiver availability  Inaccessible transportation  Resource availability      Rehab evaluation plan: Recommend Acute Rehab  Rehabilitation Impairment Group Code: 8.51  Rehab Impairment Group: Orthopedic-Right hip revision  Estimated Length of Stay (days): 9  Rehab Diagnosis: Impaired mobility and ADL's due to [address] Signature: Electronically signed by Mike Vasquez RN on 7/29/22 at 12:38 PM EDT      I have reviewed and concur with the above Preadmission Screening.    Rehab Admitting Doctor: Dr. Graciela Tsai, DO

## 2022-07-29 NOTE — CARE COORDINATION
LSW SPOKE WITH THE PT REGARDING HER DC PLAN. PT DOES NOT FEEL THAT SHE CAN GO HOME AT THIS TIME AND [facility]. RENETTA REVIEWING FOR POSSIBLE ADMISSION TO REHAB TODAY.

## 2022-07-29 NOTE — DISCHARGE SUMMARY
diagnoses:   Acute post-operative pain  Acute blood loss anemia secondary to expected surgical blood loss

## 2022-07-29 NOTE — PROGRESS NOTES
Progress Note  Date:2022       [facility]     YOB: 1950     Age:71 y.o. Subjective    Subjective:  Symptoms:  No shortness of breath, malaise, cough, chest pain, weakness, headache, chest pressure, anorexia, diarrhea or anxiety. Review of Systems   Respiratory:  Negative for cough and shortness of breath. Cardiovascular:  Negative for chest pain. Gastrointestinal:  Negative for anorexia and diarrhea. Neurological:  Negative for weakness. Objective         Vitals Last 24 Hours:  TEMPERATURE:  Temp  Av.7 °F (36.5 °C)  Min: 97 °F (36.1 °C)  Max: 98.2 °F (36.8 °C)  RESPIRATIONS RANGE: Resp  Av.7  Min: 7  Max: 20  PULSE OXIMETRY RANGE: SpO2  Av.9 %  Min: 94 %  Max: 99 %  PULSE RANGE: Pulse  Av.6  Min: 48  Max: 69  BLOOD PRESSURE RANGE: Systolic (93NFM), WFC:695 , Min:98 , A   ; Diastolic (16AUH), RUV:17, Min:54, Max:85    I/O (24Hr): Intake/Output Summary (Last 24 hours) at 2022 1232  Last data filed at 2022 1455  Gross per 24 hour   Intake 949.04 ml   Output --   Net 949.04 ml     Objective:  General Appearance:  Comfortable, well-appearing and in no acute distress. Vital signs: (most recent): Blood pressure (!) 118/56, pulse 58, temperature 98.2 °F (36.8 °C), temperature source Oral, resp. rate 18, SpO2 98 %, not currently breastfeeding. HEENT: Normal HEENT exam.    Lungs:  Normal effort. Heart: Normal rate. S1 normal and S2 normal.    Abdomen: Abdomen is soft. Bowel sounds are normal.   There is no epigastric area or suprapubic area tenderness. Neurological: Patient is alert. Pupils:  Pupils are equal, round, and reactive to light. Skin:  Warm and dry.     Labs/Imaging/Diagnostics    Labs:  CBC:  Recent Labs     22  0542   WBC 5.0   RBC 3.59*   HGB 10.4*   HCT 31.4*   MCV 87.5   RDW 13.5        CHEMISTRIES:  Recent Labs     22  0543      K 4.1      CO2 23   BUN 14   CREATININE 0.64   GLUCOSE 109*     PT/INR:No results for input(s): PROTIME, INR in the last 72 hours. APTT:No results for input(s): APTT in the last 72 hours. LIVER PROFILE:No results for input(s): AST, ALT, BILIDIR, BILITOT, ALKPHOS in the last 72 hours. Imaging Last 24 Hours:  XR HIP RIGHT (1 VIEW)    Result Date: 7/28/2022  PROCEDURE: X-ray right hip INDICATION: Postoperative evaluation. COMPARISON: 7/20/2022 FINDINGS: Unremarkable alignment of the right hip prosthesis, no evidence of lucency surrounding the prosthesis. No evidence of cortical irregularity or lucency to suggest a fracture, no evidence of lytic or sclerotic bone lesion is seen. Mild degenerative bone changes are seen. The overlying soft tissues are unremarkable. Unremarkable alignment of the right hip prosthesis, no evidence of complications. Assessment//Plan           Hospital Problems             Last Modified POA    * (Principal) Status post total replacement of right hip 7/28/2022 Yes    Impaired mobility and activities of daily living 7/29/2022 Yes    CAD (coronary artery disease) 7/29/2022 Yes    COPD (chronic obstructive pulmonary disease) ([facility] [address] 7/29/2022 Yes   Encounter for post surgical care  COPD   CAD    Assessment & Plan  7/29: Patient has no chest pain or shortness of breath. Pain is controlled. Can be discharged anytime. No overnight events, no new complaints. Continue current care.   Electronically signed by Leonidas Clark MD on 7/29/22 at 12:32 PM EDT

## 2022-07-29 NOTE — DISCHARGE INSTR - COC
Continuity of Care Form    Patient Name: Ada Macias   :  1950  MRN:  [id number]    Admit date:  2022  Discharge date:  ***    Code Status Order: Full Code   Advance Directives:   [facility] Documentation       Date/Time Healthcare Directive Type of Healthcare Directive Copy in [facility] [address] Agent's Name Healthcare Agent's Phone Number    [phone] Yes, patient has an advance directive for healthcare treatment Durable power of  for health care Yes, copy in chart Spouse -- --            Admitting Physician:  Luis Alberto Shell MD  PCP: Dimitris Barlow DO    Discharging Nurse: [facility] Unit/Room#: O726/K871-97  Discharging Unit Phone Number: ***    Emergency Contact:   Extended Emergency Contact Information  Primary Emergency Contact: Shivma Mena [address] Phone: [phone]  Work Phone: [phone]  Mobile Phone: [phone]  Relation: Child  Secondary Emergency Contact: Kayli Lorenzo [address] Phone: [phone]  Work Phone: [phone]  Mobile Phone: [phone]  Relation: Brother/Sister    Past Surgical History:  Past Surgical History:   Procedure Laterality Date    APPENDECTOMY      CARDIAC CATHETERIZATION  2013     [address]    fusion    CHOLECYSTECTOMY      COLONOSCOPY  2009    COSMETIC SURGERY  2010    on eye    JOINT REPLACEMENT Right 2021    hip    CO TOTAL KNEE ARTHROPLASTY Left 2017    LEFT TOTAL KNEE ARTHROPLASTY SUPINE BELEN PSI SPINAL (OUTSIDE PAT DR. OLIVA) performed by Tyron Hutchinson MD at [address] [place] [address]       Immunization History:   Immunization History   Administered Date(s) Administered    Influenza Virus Vaccine 10/14/2015    Influenza Whole 2013    Influenza, High Dose (Fluzone 65 yrs and older) 11/15/2016, 10/18/2017, 2018    Pneumococcal Conjugate 13-valent Zohaib Mathis) 11/15/2016    Pneumococcal Polysaccharide (Phvnrxmmx09) 2014    Td, unspecified formulation 2013       Active Problems:  Patient Active Problem List   Diagnosis Code    Osteoarthritis M19.90    Headache R51.9    Hernia, hiatal K44.9    Anxiety F41.9    Family history of heart disease Z82.49    Hx of cardiac cath x 2, normal, last  Z98.890    Fatigue R53.83    Allergic rhinitis, Dr Tara Escobar J30.9    Dyslipidemia E78.5    Cat bite involving extremity [id number]    Cellulitis, improving L03.90    CAD (coronary artery disease) I25.10    S/P cervical spinal fusion, 5-6 Z98.1    CTS (carpal tunnel syndrome), Ortho G56.00    Cervical radiculitis, surgery pending M54.12    History of tobacco use Z87.891    PADMINI (generalized anxiety disorder) F41.1    DDD (degenerative disc disease), lumbar M51.36    Cervical spine disease M48.9    COPD (chronic obstructive pulmonary disease) ([facility]) J44.9    Migraine G43.909    Myalgia M79.10    Osteoarthritis of left knee M17.12    Other symptoms referable to back M53.80    Postlaminectomy syndrome, cervical region M96.1    Right leg numbness R20.0    Status post total replacement of right hip Z96.641       Isolation/Infection:   Isolation            No Isolation          Patient Infection Status       None to display            Nurse Assessment:  Last Vital Signs: BP (!) 123/55   Pulse 61   Temp 97.5 °F (36.4 °C) (Oral)   Resp 18   LMP  (LMP Unknown)   SpO2 97%     Last documented pain score (0-10 scale): Pain Level: 8  Last Weight:   Wt Readings from Last 1 Encounters:   22 174 lb 6.4 oz (79.1 kg)     Mental Status:  {IP PT MENTAL STATUS:48260}    IV Access:  {[facility] [place]ESS:167433416}    Nursing Mobility/ADLs:  Walking   {P DME YSLS:620299555}  Transfer  {P DME VY}  Bathing  {P DME NCAS:055775095}  Dressing  {P DME ZZNB:047123112}  Toileting  {P DME WPNN:377872819}  Feeding  {[facility] DME PPWD:365774495}  Med Admin  {[facility] DME WGWY:080865887}  Med Delivery   [employer] MED Delivery:948465591}    Wound Care Documentation and Therapy:  Incision 22 Thigh Right;Lateral;Outer (Active)   Dressing Status Clean;Dry; Intact 22 1457   Incision Cleansed Cleansed with saline 22 145   Closure Surgical glue 22 1457   Margins Approximated 22 1457   Incision Assessment Dry 22 1457   Drainage Amount None 22 1457   Odor None 22 1457   Jessica-incision Assessment Warm; Intact 22 1457   Number of days: 0        Elimination:  Continence: Bowel: {YES / MORAIMA:06244}  Bladder: {YES / YK:80185}  Urinary Catheter: {Urinary Catheter:397980131}   Colostomy/Ileostomy/Ileal Conduit: {YES / CX:15281}       Date of Last BM: ***    Intake/Output Summary (Last 24 hours) at 2022 0534  Last data filed at 2022 1455  Gross per 24 hour   Intake 949.04 ml   Output --   Net 949.04 ml     I/O last 3 completed shifts:   In: 949 [I.V.:849; IV Piggyback:100]  Out: -     Safety Concerns:     [employer] Safety Concerns:291286092}    Impairments/Disabilities:      [employer] Impairments/Disabilities:762729971}    Nutrition Therapy:  Current Nutrition Therapy:   [employer] Diet List:770460312}    Routes of Feeding: {CHP DME Other Feedings:628985763}  Liquids: {Slp liquid thickness:37555}  Daily Fluid Restriction: {CHP DME Yes amt example:181912882}  Last Modified Barium Swallow with Video (Video Swallowing Test): {Done Not Done OZYS:792882286}    Treatments at the Time of Hospital Discharge:   Respiratory Treatments: ***  Oxygen Therapy:  {Therapy; copd oxygen:27142}  Ventilator:    { AMANDEEP Vent JASVIR:670786829}    Rehab Therapies: {THERAPEUTIC INTERVENTION:1820352043}  Weight Bearing Status/Restrictions: [facility] Weight Bearin}  Other Medical Equipment (for information only, NOT a DME order):  {EQUIPMENT:921592370}  Other Treatments: ***    Patient's personal belongings (please select all that are sent with patient):  {DAVID DME Belongings:405677347}    RN SIGNATURE:  {Esignature:808948204}    CASE MANAGEMENT/SOCIAL WORK SECTION    Inpatient Status Date: 7/28/22    Readmission Risk Assessment Score:  Readmission Risk              Risk of Unplanned Readmission:  [id number]           Discharging to Facility/ Agency   Name:   Address:  Phone:  Fax:    Dialysis Facility (if applicable)   Name:  Address:  Dialysis Schedule:  Phone:  Fax:    / signature: Electronically signed by RAY Coats on 7/29/22 at 11:21 AM EDT    PHYSICIAN SECTION    Prognosis: {Prognosis:6145177659}    Condition at Discharge: Shannan Nelson Patient Condition:759973715}    Rehab Potential (if transferring to Rehab): {Prognosis:9879100173}    Recommended Labs or Other Treatments After Discharge: ***    Physician Certification: I certify the above information and transfer of Hosea Alcantara  is necessary for the continuing treatment of the diagnosis listed and that she requires {Admit to Appropriate Level of Care:90387} for {GREATER/LESS:492313831} 30 days.      Update Admission H&P: {CHP DME Changes in QUUIA:477722679}    PHYSICIAN SIGNATURE:  Electronically signed by Luzma Rasmussen MD on 7/29/22 at 5:34 AM EDT

## 2022-07-29 NOTE — PROGRESS NOTES
[facility] OCCUPATIONAL THERAPY EVALUATION - ACUTE     NAME: Evelin Corbin  : 1950 (80 y.o.)  MRN: [id number]  CODE STATUS: Full Code  Room: J604/D993-54    Date of Service: 2022    Patient Diagnosis(es): Instability of hip joint, right [M25.351]  Status post total replacement of right hip [Z96.641]   Patient Active Problem List    Diagnosis Date Noted    Impaired mobility and activities of daily living 2022    Bilateral carotid artery stenosis 2022    Acquired hypothyroidism 2022    Hyperglycemia 2022    Status post total replacement of right hip 2022    Depressive disorder 2022    Lumbar spondylosis 2021    Chronic mesenteric ischemia ([facility] [address] 07/10/2020    Cervical spondylosis without myelopathy 2013    Cervical spine disease 2018    Migraine 2018    Right leg numbness 2018    Osteoarthritis of left knee 2017    COPD (chronic obstructive pulmonary disease) ([facility] [address] 2017    DDD (degenerative disc disease), lumbar 2016    PADMINI (generalized anxiety disorder) 2015    History of tobacco use 2014    S/P cervical spinal fusion, 5-6 2014    CTS (carpal tunnel syndrome), Ortho 2014    Cervical radiculitis, surgery pending 2014    CAD (coronary artery disease) 2014    Cat bite involving extremity 2013    Cellulitis, improving 2013    Allergic rhinitis, Dr Arellano Police 10/28/2013    Dyslipidemia 10/28/2013    Hx of cardiac cath x 2, normal, last 9/13 10/07/2013    Fatigue 10/07/2013    Postlaminectomy syndrome, cervical region 2013    Family history of heart disease 2013    Osteoarthritis     Headache     Hernia, hiatal     Anxiety     Myalgia 10/16/2009    Other symptoms referable to back 2007        Past Medical History:   Diagnosis Date    Acquired hypothyroidism 2022    Allergic rhinitis, Dr Arellano Police 10/28/2013    Anxiety     Back pain     Bilateral carotid artery stenosis 7/29/2022    CAD (coronary artery disease)     Cat bite involving extremity 11/5/2013    Cat bite involving extremity 11/5/2013    Cellulitis 11/5/2013    Cellulitis, improving 11/5/2013    Cervical radiculitis 1/13/2014    Cervical radiculitis, surgery pending 1/13/2014    COPD (chronic obstructive pulmonary disease) ([address] 1/3/2017    CTS (carpal tunnel syndrome), Ortho 1/13/2014    Depressive disorder 3/25/2022    Dyslipidemia 10/28/2013    Fatigue 10/7/2013    Headache(784.0)     H/O MIGRANES    Hernia, hiatal     H/O     Hx of cardiac cath x 2, normal, last 9/13 10/7/2013    Osteoarthritis     S/P cervical spinal fusion, 5-6 1/13/2014    Seizure disorder ([address]     Seizure disorder ([address]     Sinusitis 10/28/2013    Tobacco abuse, quit 9/3/13 10/7/2013    Tobacco abuse, quit 9/3/13      Past Surgical History:   Procedure Laterality Date    APPENDECTOMY      CARDIAC CATHETERIZATION  09/05/2013     [address]    fusion    CHOLECYSTECTOMY      COLONOSCOPY  2009    COSMETIC SURGERY  2010    on eye    JOINT REPLACEMENT Right 08/09/2021    hip    LA TOTAL KNEE ARTHROPLASTY Left 12/05/2017    LEFT TOTAL KNEE ARTHROPLASTY SUPINE BELEN PSI SPINAL (OUTSIDE PAT DR. OLIVA) performed by Gerhard Cardozo MD at [facility] Right 7/28/2022    RIGHT HIP REVISION TOTAL HIP ARTHROPLASTY performed by Amadeo Cheung MD at [address] [place] [address]        Restrictions  Restrictions/Precautions: Weight Bearing      Right Lower Extremity Weight Bearing: Weight Bearing As Tolerated  Left Lower Extremity Weight Bearing: Weight Bearing As Tolerated  Hip Precautions: Posterior hip precautions    Safety Devices: Safety Devices  Type of Devices: Bed alarm in place; Left in bed; All fall risk precautions in place     Patient's date of birth confirmed: Yes    General:  Chart Reviewed: Yes  Patient assessed for rehabilitation services?: [place]: No  Oculo Motor Control: WNL    GROSS ASSESSMENT AROM/PROM:  AROM: Generally decreased, functional  PROM: Generally decreased, functional    ROM:   LUE AROM (degrees)  LUE AROM : WFL  Left Hand AROM (degrees)  Left Hand AROM: WFL  RUE AROM (degrees)  RUE AROM : WFL  Right Hand AROM (degrees)  Right Hand AROM: WFL    UE STRENGTH:  Strength: Generally decreased, functional    UE COORDINATION:  Coordination: Generally decreased, functional    UE TONE:  Tone: Normal    UE SENSATION:  Sensation: Intact    Hand Dominance:  Hand Dominance  Hand Dominance: Right    ADL Status:  ADL  Feeding: Independent  Grooming: Setup  UE Bathing: Setup  LE Bathing: Moderate assistance  UE Dressing: Setup  LE Dressing: Moderate assistance  Toileting: Minimal assistance  Additional Comments: Simulated ADLs as above. Pt. limited d/t pain and hip precautions. Limited d/t fatigue and weakness, requires increased time and effort for all weight shifting. Mild decreased static standing balance which impacts clothing management for toileting and bathing. Toilet Transfers  Toilet - Technique: Ambulating  Equipment Used: Standard bedside commode  Toilet Transfer: Contact guard assistance  Toilet Transfers Comments: CGA for standing, SBA to sit, verbal cues for hip precautions    Pt declined ADL d/t nausea and pain this date. States she recalls use of AE from previous hip replacements. Functional Mobility:  Patient ambulated  to sink, then BSC, then back to bed  with [address] at SBA level. Increased effort for turns. Limited d/t nausea and pain.  Requests to return to supine d/t decreased sleep overnight and increased nausea since eating breakfast.    Bed Mobility  Bed mobility  Sit to Supine: Stand by assistance  Scooting: Contact guard assistance  Bed Mobility Comments: vc's for maintaining hip precautions, increased effort    Seated and Standing Balance:  Balance  Sitting: Impaired  Sitting - Static: Good (unsupported)  Sitting - Dynamic: Fair (occasional)  Standing: Impaired  Standing - Static: Fair  Standing - Dynamic: Fair    Functional Endurance:  Activity Tolerance  Activity Tolerance: Patient Tolerated treatment well    D/C Recommendations:  OT D/C RECOMMENDATIONS  REQUIRES OT FOLLOW-UP: Yes    Equipment Recommendations:  OT Equipment Recommendations  Other: Continue to assess- hip kit    OT Education:   Patient Education  Education Given To: Patient  Education Provided: Role of Therapy;Plan of Care  Education Method: Verbal  Barriers to Learning: None  Education Outcome: Verbalized understanding    OT Follow Up:   OT D/C RECOMMENDATIONS  REQUIRES OT FOLLOW-UP: Yes       Assessment/Discharge Disposition:  Assessment: Pt is a 80year old woman from home who presents to [address] [place] [address] with the above deficits which impact her ability to perform ADLs and IADLs s/p planned total hip replacement. Pt. demonstrates decreased balance and endurance and is limited primarily d/t pain. Pt. would benefit from continued OT to maximize independence and safety with ADL tasks.   Performance deficits / Impairments: Decreased functional mobility , Decreased ADL status, Decreased strength, Decreased endurance, Decreased balance, Decreased high-level IADLs  Prognosis: Good  Discharge Recommendations: Continue to assess pending progress  Decision Making: Medium Complexity  History: Pt's medical history is moderately complex  Exam: Pt. has 6 performance deficits  Assistance / Modification: Pt requires min-mod A    AMPAC (Six Click) Self care Score   How much help for putting on and taking off regular lower body clothing?: A Lot  How much help for Bathing?: A Lot  How much help for Toileting?: A Little  How much help for putting on and taking off regular upper body clothing?: A Little  How much help for taking care of personal grooming?: A Little  How much help for eating meals?: None  AM-PAC Inpatient Daily Activity Raw Score: 17  AM-PAC Inpatient ADL T-Scale Score : 37.26  ADL Inpatient CMS 0-100% Score: 50.11    Therapy key for assistance levels -   Independent/Mod I = Pt. is able to perform task with no assistance but may require a device   Stand by assistance = Pt. does not perform task at an independent level but does not need physical assistance, requires verbal cues  Minimal, Moderate, Maximal Assistance = Pt. requires physical assistance (25%, 50%, 75% assist from helper) for task but is able to actively participate in task   Dependent = Pt. requires total assistance with task and is not able to actively participate with task completion     Plan:  Plan  Times per Week: 1-4x  Plan Weeks: Length of acute stay  Current Treatment Recommendations: Strengthening, Balance training, Functional mobility training, Endurance training, Pain management, Safety education & training, Patient/Caregiver education & training, Equipment evaluation, education, & procurement, Self-Care / ADL, Home management training    Goals:   Patient will:    - Improve functional endurance to tolerate/complete 40 mins of ADL's  - Be Mod I in UB ADLs   - Be Mod I in LB ADLs  - Be Mod I in ADL transfers without LOB  - Be Mod I in toileting tasks  - Improve B UE strength and endurance to 4/5 in order to participate in self-care activities as projected. - Access appropriate D/C site with as few architectural barriers as possible. - Sequence self-care tasks with no verbal cues for safety    Patient Goal: Patient goals : \"I want to get stronger so I can feel safe going home\"     Discussed and agreed upon: Yes Comments:       Therapy Time:   Individual   Time In 1047   Time Out 1105   Minutes 18     Eval: 18 minutes     Electronically signed by:     NELSY Hayes,   7/29/2022, 11:19 AM

## 2022-07-29 NOTE — PROGRESS NOTES
Physical Therapy Missed Treatment   Facility/Department: [facility] MED SURG Y877/H899-34    NAME: Johnathan Fonseca    : 1950 (80 y.o.)  MRN: [id number]    Account: [de-identified]  Gender: female    Chart reviewed, attempted PT at 36. Patient unavailable 2° to:    [] Hold per ns request    [x] Pt declined pt unwilling to work with therapy at this time stating she just got back to bed from the bedside commode and is set to dc to acute rehab shortly. [] Pt. . off floor for test/procedure. [] Pt. Unavailable       Will attempt PT treatment again at earliest convenience.       Electronically signed by Selene Lima PTA on 22 at 3:36 PM EDT

## 2022-07-29 NOTE — DISCHARGE INSTRUCTIONS
Hip Replacement  Discharge Instructions    To prevent Clot formation, you have been placed on the following medication:  Aspirin 81 mg twice a day for 30 days started on 7/28/22  Surgical Site Care:  Dressing change every days and PRN (as needed) with 4 x 4 and porous tape. No betadine. If glue is present, leave open to air  If Aquacel Ag (rubber) dressing is present, do not remove dressing for 7 days, unless heavily saturated. If heavily saturated, remove dressing and start daily dressing changes as described above   if Staples  will be removed on post-operative day 14 and steri-strips applied  Showering is permitted starting POD1 if waterproof aquacell dressing is present or when incision is covered with waterproof dressing, such as 4 x 4 and tegaderm  Physical Therapy:  Weight Bearing Status:  WBAT (Weigh bearing as tolerated)   Hip Precautions  Per Physical Therapy handout, no hip precautions  Pain Medications  You were given oxycodone  Wean off pain medications as you deem appropriate as long as pain is under control  Cold packs/Ice packs/Machine  May be used 3 times daily for 15-30 minutes as necessary  Be sure to have a barrier (cloth, clothing, towel) between the site and the ice pack to prevent [address] for Orthopedics office if  Increased redness, swelling, drainage of any kind, and/or pain to surgery site. As well as new onset fevers and or chills. These could signify an infection. Calf or thigh tenderness to touch as well as increased swelling or redness. This could signify a clot formation. Numbness or tingling to an area around the incision site or below the incision site (toes). Any rash appears, increased  or new onset nausea/vomiting occur. This may indicate a reaction to a medication. Phone # [phone].   Follow up with Surgeon  I acknowledge that I have received leilani hose and understand the instructions on how and when to wear them (on during the day off at night) Discharging RN who has gone over instructions and acknowledges leilani hose have been received

## 2022-07-30 PROBLEM — Z78.9 IMPAIRED MOBILITY AND ADLS: Status: ACTIVE | Noted: 2022-07-30

## 2022-07-30 PROBLEM — Z74.09 IMPAIRED MOBILITY AND ADLS: Status: ACTIVE | Noted: 2022-07-30

## 2022-07-30 PROCEDURE — 6370000000 HC RX 637 (ALT 250 FOR IP): Performed by: CLINICAL NURSE SPECIALIST

## 2022-07-30 PROCEDURE — 6360000002 HC RX W HCPCS: Performed by: NURSE PRACTITIONER

## 2022-07-30 PROCEDURE — 97166 OT EVAL MOD COMPLEX 45 MIN: CPT

## 2022-07-30 PROCEDURE — 97162 PT EVAL MOD COMPLEX 30 MIN: CPT

## 2022-07-30 PROCEDURE — 2580000003 HC RX 258: Performed by: NURSE PRACTITIONER

## 2022-07-30 PROCEDURE — 97535 SELF CARE MNGMENT TRAINING: CPT

## 2022-07-30 PROCEDURE — 99222 1ST HOSP IP/OBS MODERATE 55: CPT | Performed by: PHYSICAL MEDICINE & REHABILITATION

## 2022-07-30 PROCEDURE — 6370000000 HC RX 637 (ALT 250 FOR IP): Performed by: NURSE PRACTITIONER

## 2022-07-30 PROCEDURE — 97110 THERAPEUTIC EXERCISES: CPT

## 2022-07-30 PROCEDURE — 6360000002 HC RX W HCPCS: Performed by: PHYSICAL MEDICINE & REHABILITATION

## 2022-07-30 PROCEDURE — 6370000000 HC RX 637 (ALT 250 FOR IP): Performed by: PHYSICAL MEDICINE & REHABILITATION

## 2022-07-30 PROCEDURE — 97116 GAIT TRAINING THERAPY: CPT

## 2022-07-30 PROCEDURE — 1180000000 HC REHAB R&B

## 2022-07-30 RX ORDER — UBIDECARENONE 100 MG
100 CAPSULE ORAL DAILY
Status: DISCONTINUED | OUTPATIENT
Start: 2022-07-30 | End: 2022-08-03 | Stop reason: HOSPADM

## 2022-07-30 RX ORDER — CETIRIZINE HYDROCHLORIDE 10 MG/1
10 TABLET ORAL DAILY
Refills: 1 | Status: DISCONTINUED | OUTPATIENT
Start: 2022-07-31 | End: 2022-08-03 | Stop reason: HOSPADM

## 2022-07-30 RX ORDER — VITAMIN B COMPLEX
2000 TABLET ORAL
Status: DISCONTINUED | OUTPATIENT
Start: 2022-07-30 | End: 2022-08-03 | Stop reason: HOSPADM

## 2022-07-30 RX ORDER — TOPIRAMATE 25 MG/1
25 TABLET ORAL 2 TIMES DAILY
Status: DISCONTINUED | OUTPATIENT
Start: 2022-07-30 | End: 2022-08-03 | Stop reason: HOSPADM

## 2022-07-30 RX ORDER — ROSUVASTATIN CALCIUM 10 MG/1
10 TABLET, COATED ORAL NIGHTLY
Status: DISCONTINUED | OUTPATIENT
Start: 2022-07-30 | End: 2022-08-03 | Stop reason: HOSPADM

## 2022-07-30 RX ORDER — ACETAMINOPHEN 325 MG/1
650 TABLET ORAL EVERY 4 HOURS PRN
Status: DISCONTINUED | OUTPATIENT
Start: 2022-07-30 | End: 2022-08-03 | Stop reason: HOSPADM

## 2022-07-30 RX ORDER — ACETAMINOPHEN 160 MG
1 TABLET,DISINTEGRATING ORAL DAILY
Status: DISCONTINUED | OUTPATIENT
Start: 2022-07-30 | End: 2022-07-30 | Stop reason: SDUPTHER

## 2022-07-30 RX ORDER — BISACODYL 10 MG
10 SUPPOSITORY, RECTAL RECTAL DAILY PRN
Status: DISCONTINUED | OUTPATIENT
Start: 2022-07-30 | End: 2022-08-03 | Stop reason: HOSPADM

## 2022-07-30 RX ORDER — GABAPENTIN 300 MG/1
300 CAPSULE ORAL NIGHTLY
Status: DISCONTINUED | OUTPATIENT
Start: 2022-07-30 | End: 2022-08-03 | Stop reason: HOSPADM

## 2022-07-30 RX ORDER — ESCITALOPRAM OXALATE 10 MG/1
5 TABLET ORAL DAILY
Status: DISCONTINUED | OUTPATIENT
Start: 2022-07-31 | End: 2022-08-03 | Stop reason: HOSPADM

## 2022-07-30 RX ORDER — ASPIRIN 81 MG/1
81 TABLET ORAL 2 TIMES DAILY
Status: DISCONTINUED | OUTPATIENT
Start: 2022-07-30 | End: 2022-08-03 | Stop reason: HOSPADM

## 2022-07-30 RX ORDER — SODIUM PHOSPHATE, DIBASIC AND SODIUM PHOSPHATE, MONOBASIC 7; 19 G/133ML; G/133ML
1 ENEMA RECTAL DAILY PRN
Status: DISCONTINUED | OUTPATIENT
Start: 2022-07-30 | End: 2022-08-03 | Stop reason: HOSPADM

## 2022-07-30 RX ORDER — CYANOCOBALAMIN 1000 UG/ML
1000 INJECTION INTRAMUSCULAR; SUBCUTANEOUS WEEKLY
Status: DISCONTINUED | OUTPATIENT
Start: 2022-07-30 | End: 2022-08-03 | Stop reason: HOSPADM

## 2022-07-30 RX ORDER — GABAPENTIN 300 MG/1
300 CAPSULE ORAL 2 TIMES DAILY
Status: DISCONTINUED | OUTPATIENT
Start: 2022-07-30 | End: 2022-08-03 | Stop reason: HOSPADM

## 2022-07-30 RX ADMIN — Medication 10 ML: at 21:02

## 2022-07-30 RX ADMIN — CETIRIZINE HYDROCHLORIDE 10 MG: 10 TABLET, FILM COATED ORAL at 08:53

## 2022-07-30 RX ADMIN — SENNOSIDES AND DOCUSATE SODIUM 1 TABLET: 50; 8.6 TABLET ORAL at 21:00

## 2022-07-30 RX ADMIN — KETOROLAC TROMETHAMINE 15 MG: 15 INJECTION, SOLUTION INTRAMUSCULAR; INTRAVENOUS at 21:31

## 2022-07-30 RX ADMIN — KETOROLAC TROMETHAMINE 15 MG: 15 INJECTION, SOLUTION INTRAMUSCULAR; INTRAVENOUS at 14:26

## 2022-07-30 RX ADMIN — GABAPENTIN 300 MG: 300 CAPSULE ORAL at 20:59

## 2022-07-30 RX ADMIN — GABAPENTIN 300 MG: 300 CAPSULE ORAL at 08:53

## 2022-07-30 RX ADMIN — Medication 10 ML: at 08:55

## 2022-07-30 RX ADMIN — ACETAMINOPHEN 650 MG: 325 TABLET ORAL at 17:15

## 2022-07-30 RX ADMIN — CYANOCOBALAMIN 1000 MCG: 1000 INJECTION, SOLUTION INTRAMUSCULAR; SUBCUTANEOUS at 08:54

## 2022-07-30 RX ADMIN — ASPIRIN 81 MG: 81 TABLET, COATED ORAL at 08:53

## 2022-07-30 RX ADMIN — GABAPENTIN 300 MG: 300 CAPSULE ORAL at 21:00

## 2022-07-30 RX ADMIN — Medication 2000 UNITS: at 17:15

## 2022-07-30 RX ADMIN — ASPIRIN 81 MG: 81 TABLET, COATED ORAL at 21:00

## 2022-07-30 RX ADMIN — ESCITALOPRAM OXALATE 5 MG: 10 TABLET ORAL at 08:53

## 2022-07-30 RX ADMIN — ROSUVASTATIN CALCIUM 10 MG: 10 TABLET, FILM COATED ORAL at 21:00

## 2022-07-30 RX ADMIN — Medication 100 MG: at 08:53

## 2022-07-30 RX ADMIN — SENNOSIDES AND DOCUSATE SODIUM 1 TABLET: 50; 8.6 TABLET ORAL at 08:53

## 2022-07-30 RX ADMIN — KETOROLAC TROMETHAMINE 15 MG: 15 INJECTION, SOLUTION INTRAMUSCULAR; INTRAVENOUS at 05:10

## 2022-07-30 RX ADMIN — LORAZEPAM 1 MG: 1 TABLET ORAL at 21:00

## 2022-07-30 RX ADMIN — ACETAMINOPHEN 650 MG: 325 TABLET ORAL at 12:06

## 2022-07-30 ASSESSMENT — PAIN DESCRIPTION - PAIN TYPE: TYPE: SURGICAL PAIN

## 2022-07-30 ASSESSMENT — PAIN DESCRIPTION - ORIENTATION
ORIENTATION: RIGHT

## 2022-07-30 ASSESSMENT — PAIN SCALES - GENERAL
PAINLEVEL_OUTOF10: 5
PAINLEVEL_OUTOF10: 9
PAINLEVEL_OUTOF10: 7
PAINLEVEL_OUTOF10: 0
PAINLEVEL_OUTOF10: 8
PAINLEVEL_OUTOF10: 0

## 2022-07-30 ASSESSMENT — PAIN DESCRIPTION - DESCRIPTORS
DESCRIPTORS: DULL;ACHING
DESCRIPTORS: ACHING

## 2022-07-30 ASSESSMENT — PAIN DESCRIPTION - LOCATION
LOCATION: HIP;LEG
LOCATION: HIP

## 2022-07-30 ASSESSMENT — PAIN - FUNCTIONAL ASSESSMENT
PAIN_FUNCTIONAL_ASSESSMENT: ACTIVITIES ARE NOT PREVENTED
PAIN_FUNCTIONAL_ASSESSMENT: ACTIVITIES ARE NOT PREVENTED

## 2022-07-30 NOTE — CONSULTS
Patient is a 73-year-old female with past medical history of anxiety, cervical radiculitis, this post right hip revision with a total hip arthroplasty. Initially had hip replacement in Aug 2021, in April of 2022 dislocated hip and in July had dislocation of her hip. She had a revision done and tolerated surgery without postoperative complications. She has been admitted to Acute Rehabilitation. Hospital medicine consulted for medical management. She denies sob, chest pain, orthopnea or pnd. Denies fever or chills. Denies any complaints. Pain is controlled. Past Medical History:   Diagnosis Date    Acquired hypothyroidism 7/29/2022    Allergic rhinitis, Dr Jolinda Bence 10/28/2013    Anxiety     Back pain     Bilateral carotid artery stenosis 7/29/2022    CAD (coronary artery disease)     Cat bite involving extremity 11/5/2013    Cat bite involving extremity 11/5/2013    Cellulitis 11/5/2013    Cellulitis, improving 11/5/2013    Cervical radiculitis 1/13/2014    Cervical radiculitis, surgery pending 1/13/2014    COPD (chronic obstructive pulmonary disease) ([address] 1/3/2017    CTS (carpal tunnel syndrome), Ortho 1/13/2014    Depressive disorder 3/25/2022    Dyslipidemia 10/28/2013    Fatigue 10/7/2013    Headache(784.0)     H/O MIGRANES    Hernia, hiatal     H/O     Hx of cardiac cath x 2, normal, last 9/13 10/7/2013    Osteoarthritis     S/P cervical spinal fusion, 5-6 1/13/2014    Seizure disorder ([address]     Seizure disorder ([address]     Sinusitis 10/28/2013    Tobacco abuse, quit 9/3/13 10/7/2013    Tobacco abuse, quit 9/3/13      Past Surgical History:   Procedure Laterality Date    APPENDECTOMY      CARDIAC CATHETERIZATION  09/05/2013      [address]    fusion    CHOLECYSTECTOMY      COLONOSCOPY  2009    COSMETIC SURGERY  2010    on eye    JOINT REPLACEMENT Right 08/09/2021    hip    DE TOTAL KNEE ARTHROPLASTY Left 12/05/2017    LEFT TOTAL KNEE ARTHROPLASTY SUPINE BELEN PSI SPINAL (OUTSIDE PAT DR. OLIVA) performed by Fanny Bourne MD at [address] Right 7/28/2022    RIGHT HIP REVISION TOTAL HIP ARTHROPLASTY performed by Kiya Desir MD at [facility]     Social History       Tobacco History       Smoking Status  Former Quit Date  9/6/2013 Smoking Frequency  0.50 packs/day for 20.00 years (10.00 pk-yrs) Smoking Tobacco Type  Cigarettes quit in 9/6/2013      Smokeless Tobacco Use  Never              Alcohol History       Alcohol Use Status  No              Drug Use       Drug Use Status  Never              Sexual Activity       Sexually Active  Not Asked                  Family History   Problem Relation Age of Onset    Heart Disease Father         MI    High Blood Pressure Father     Depression Mother      No current facility-administered medications on file prior to encounter. Current Outpatient Medications on File Prior to Encounter   Medication Sig Dispense Refill    gabapentin (NEURONTIN) 300 MG capsule TAKE 1 CAPSULE TWICE DAILY & TAKE 3 CAPSULES AT BEDTIME      escitalopram (LEXAPRO) 10 MG tablet Take 5 mg by mouth      topiramate (TOPAMAX) 25 MG tablet Take 25 mg by mouth 2 times daily (Patient not taking: No sig reported)      nitroGLYCERIN (NITROSTAT) 0.4 MG SL tablet Place 0.4 mg under the tongue every 5 minutes as needed for Chest pain up to max of 3 total doses. If no relief after 1 dose, call 911. (Patient not taking: Reported on 7/28/2022)      Probiotic Product (PROBIOTIC ADVANCED PO) Take by mouth      sodium chloride (OCEAN, BABY AYR) 0.65 % nasal spray 1 spray by Nasal route as needed for Congestion      Cholecalciferol (VITAMIN D3) 50 MCG (2000 UT) CAPS Take by mouth      LORazepam (ATIVAN) 1 MG tablet Take 1 mg by mouth 3 times daily as needed for Anxiety.        rosuvastatin (CRESTOR) 10 MG tablet Take 10 mg by mouth daily      loratadine (CLARITIN) 10 MG tablet Take 1 tablet by mouth daily 30 tablet 1 aspirin 81 MG EC tablet Take 1 tablet by mouth 2 times daily 60 tablet 0     Lab Results   Component Value Date    WBC 5.0 07/29/2022    HGB 10.4 (L) 07/29/2022    HCT 31.4 (L) 07/29/2022    MCV 87.5 07/29/2022     07/29/2022     Lab Results   Component Value Date/Time     07/29/2022 05:43 AM    K 4.1 07/29/2022 05:43 AM     07/29/2022 05:43 AM    CO2 23 07/29/2022 05:43 AM    BUN 14 07/29/2022 05:43 AM    CREATININE 0.64 07/29/2022 05:43 AM    GLUCOSE 109 07/29/2022 05:43 AM    GLUCOSE 81 09/29/2011 10:44 AM    CALCIUM 8.7 07/29/2022 05:43 AM    ROS: 12 point review of system is negative except was stated in HPI  HEENT: AT/NC, PERRLA, no JVD  HEART: s1/s2 wnl w/o s3  LUNG: clear  ABD: soft, NT, ND  EXT: no edema  SKin : no rash, surgically impaired to right hip  Neuro:no focal deficits      80 y.o Patient is a 80-year-old female with past medical history of  CAD, COPD, anxiety, cervical radiculitis, this post right hip revision with a total hip arthroplasty    S/p right total hip replacement- primary services managing- percocet for pain. PT/OT  CAD/bilateral carotid artery stenosis- continue asa, statin-?  Cardiac cath x2 normal  Copd- stable, on room air, not on maintenance medications  Anxiety- lexapro      2) DVT px

## 2022-07-30 NOTE — PROGRESS NOTES
Physical Therapy Rehab Treatment Note  Facility/Department: [facility]  [facility]       NAME: Olivia Saleh  : 1950 (80 y.o.)  MRN: [id number]  CODE STATUS: Full Code    Date of Service: 2022       Restrictions:  Restrictions/Precautions: Weight Bearing, Fall Risk  Lower Extremity Weight Bearing Restrictions  Right Lower Extremity Weight Bearing: Weight Bearing As Tolerated  Left Lower Extremity Weight Bearing: Weight Bearing As Tolerated  Position Activity Restriction  Hip Precautions: Posterior hip precautions       SUBJECTIVE: Pt reports she hasn't had pain meds since 0530am.  States she took Tylenol at 1230. States she does not want the Roxicodone. Pain  Pain: 8/10 R hip sore RN notified. RN reports pt had Tylenol at 1230 but is able to have IV Toradol. Educated pt on timing of meds. Pt repositioned and CP applied to L hip post session. RN administered IV Toradol. Pt reports 0/10 pain after. OBJECTIVE:         Bed mobility  Supine to Sit: Stand by assistance  Sit to Supine: Stand by assistance  Transfers  Sit to Stand: Stand by assistance  Stand to sit: Stand by assistance  Car Transfer: Stand by assistance (VCs for technique and maintaining hip prec.)    Ambulation  WB Status: R LE WBAT; posterior hip precautions  Ambulation  Surface: carpet  Device: Rolling Walker  Assistance: Stand by assistance  Quality of Gait: antalgic gait, VCs to decreased twisting with change of direction  Distance: 50ft        PT Exercises  Exercises: supine QS, SAQs, heel slides, hip abd L x20 ea  Standing Open/Closed Kinetic Chain Exercises: sink ex x10 ea         Education Provided: Precautions  Education  Education Given To: Patient  Education Provided: Precautions  Education Provided Comments: Reviewed hip prec and senarios throughout session. Insctructed pt on icing hip for pain and swelling control.   Education Method: Demonstration;Verbal  Education Outcome: Verbalized understanding;Demonstrated understanding;Continued education needed        ASSESSMENT/PROGRESS TOWARDS GOALS:   Body Structures, Functions, Activity Limitations Requiring Skilled Therapeutic Intervention: Decreased functional mobility ; Decreased strength;Decreased balance;Decreased endurance  Assessment: Cues required for following hip precautions with mobility. Goals:  Long Term Goals  Long term goal 1: Pt will demonstrate bed mobiilty with indep following posterior hip precautions  Long term goal 2: Pt will demonstrate functional transfers including car transfers with indep  Long term goal 3: Pt will amb 150ft with LRAD and indep  Long term goal 4: Pt will negotiate 12 steps with handrail and indep  Long term goal 5: Pt will demonstrate TUG <18 seconds to decrease fall risk    PLAN OF CARE/Safety:   Plan Comment: Cont per POC.       Therapy Time:   Individual   Time In 1330   Time Out 1430   Minutes 60     Minutes:  Transfer/Bed mobility training:10  Gait training:10  Neuro re education:0  Therapeutic ex:40      Raffaele Flannery PTA, 07/30/22 at 2:49 PM

## 2022-07-30 NOTE — PROGRESS NOTES
Physical Therapy Rehab Treatment Note  [facility]/Department[facility]  [facility]       NAME: Maxine Brown  : 1950 (80 y.o.)  MRN: [id number]  CODE STATUS: Full Code    Date of Service: 2022       Restrictions:  Restrictions/Precautions: Weight Bearing, Fall Risk  Lower Extremity Weight Bearing Restrictions  Right Lower Extremity Weight Bearing: Weight Bearing As Tolerated  Left Lower Extremity Weight Bearing: Weight Bearing As Tolerated  Position Activity Restriction  Hip Precautions: Posterior hip precautions       SUBJECTIVE: Pt reports she did not have to follow hip precautions with her initial hip surgery. Pain  Pain: 8/10 R hip Declined notifying nsg for meds. CP appiled to R hip x15 min during seated therex      OBJECTIVE:            Transfers  Car Transfer: Stand by assistance (VCs for technique and maintaining hip prec.)     Stairs  # Steps : 4  Stairs Height: 6\"  Rails: Bilateral  Assistance: Stand by assistance  Comment: Vc's and demo for proper sequencing. VCs for maintaining hip prec when turning around to come back down. Instruction given for slow and controlled movements. PT Exercises  Exercise Treatment: seated AP, LAQs, march within hip prec, hip add with ball, hip abd YTB, hamstring curl YTB x20 ea CP applied x15 min during seated therex. Education Provided: Precautions  Education  Education Given To: Patient  Education Provided: Precautions  Education Provided Comments: Reviewed hip prec and senarios throughout session. Insctructed pt on icing hip for pain and swelling control.   Education Method: Demonstration;Verbal  Education Outcome: Verbalized understanding;Demonstrated understanding;Continued education needed    ASSESSMENT/PROGRESS TOWARDS GOALS:      Goals:  Long Term Goals  Long term goal 1: Pt will demonstrate bed mobiilty with indep following posterior hip precautions  Long term goal 2: Pt will demonstrate functional transfers including car transfers with indep  Long term goal 3: Pt will amb 150ft with LRAD and indep  Long term goal 4: Pt will negotiate 12 steps with handrail and indep  Long term goal 5: Pt will demonstrate TUG <18 seconds to decrease fall risk    PLAN OF CARE/Safety:   Plan Comment: Cont per POC.       Therapy Time:   Individual   Time In 1130   Time Out 1200   Minutes 30     Minutes:  Transfer/Bed mobility training:10  Gait training:10  Neuro re education:0  Therapeutic ex:10      Renee Loera PTA, 07/30/22 at 12:37 PM

## 2022-07-30 NOTE — PROGRESS NOTES
Patient arrived to unit prior to 1900. Admission completed with the exception of consents as patient wants to sign in AM. Cell phone/  at bedside. Patient has upper dentures a few pieces of clothing, shoes and her personal cane from home. Assessment completed. VSS. Medicated with IV toradol. Patient declined scheduled tylenol and prn jericho as patient reports \"something is tearing up my stomach. \" She tells me she doesn't take tylenol at home and voices that jericho made her sick night prior. Pain 8/10 to right hip. Offered ice and heating pad, patient declined both offers. Aquacell dressing is c/d/I to hip. Patient WBAT. LBM 7/27. Snack provided as well as as hot tea. No distress noted. Call light within reach and bed alarm activated. Electronically signed by Fabien Horowitz RN on 7/30/2022 at 12:50 AM    Consents signed and placed in blue chart. Patient declined scheduled tylenol stating, \"I don't want to take anything more than I have to. \" Requested dose of IV toradol. Patient reports effective throughout night.   Electronically signed by Fabien Horowitz RN on 7/30/2022 at 5:45 AM

## 2022-07-30 NOTE — PROGRESS NOTES
Facility/Department: [facility]  Rehabilitation Initial Assessment: Physical Therapy  Room: R255/R255-01    NAME: Margaret Lazo  : 1950  MRN: [id number]    Date of Service: 2022    Rehab Diagnosis(es): Impaired mobility and ADLs d/t R hip revision total hip arthroplasty-rehab admit 22  Patient Active Problem List    Diagnosis Date Noted    Impaired mobility and ADLs 2022    Impaired mobility and activities of daily living [place] 2022    Bilateral carotid artery stenosis 2022    Acquired hypothyroidism 2022    Hyperglycemia 2022    Status post total replacement of right hip 2022    Depressive disorder 2022    Lumbar spondylosis 2021    Chronic mesenteric ischemia ([facility] 07/10/2020    Cervical spondylosis without myelopathy 2013    Cervical spine disease 2018    Migraine 2018    Right leg numbness 2018    Osteoarthritis of left knee 2017    COPD (chronic obstructive pulmonary disease) ([facility] 2017    DDD (degenerative disc disease), lumbar 2016    PADMINI (generalized anxiety disorder) 2015    History of tobacco use 2014    S/P cervical spinal fusion, 5-6 2014    CTS (carpal tunnel syndrome), Ortho 2014    Cervical radiculitis, surgery pending 2014    CAD (coronary artery disease) 2014    Cat bite involving extremity 2013    Cellulitis, improving 2013    Allergic rhinitis, Dr Don Camarillo 10/28/2013    Dyslipidemia 10/28/2013    Hx of cardiac cath x 2, normal, last 9/13 10/07/2013    Fatigue 10/07/2013    Postlaminectomy syndrome, cervical region 2013    Family history of heart disease 2013    Osteoarthritis     Headache     Hernia, hiatal     Anxiety     Myalgia 10/16/2009    Other symptoms referable to back 2007       Past Medical History:   Diagnosis Date    Acquired hypothyroidism 2022    Allergic rhinitisDr Don Restrictions  Right Lower Extremity Weight Bearing: Weight Bearing As Tolerated  Left Lower Extremity Weight Bearing: Weight Bearing As Tolerated  Position Activity Restriction  Hip Precautions: Posterior hip precautions     SUBJECTIVE: Subjective: Pt reports post op hip pain 8/10 \"throbbing\"    Pain   Pre treatment screenin/10 OR Faces 1-10  Location: R hip    Description: throbbing  Onset/duration:  post op, constant  [x]  Pt declined intervention  [x]  Pt able to proceed PT session  []  RN or physician notified  []  RN called to bedside to administer meds.   Post treatment screening 8/10, described as same as above      Prior Level of Function:  Social/Functional History  Lives With: Alone  Type of Home: House  Home Layout: Two level, Able to Live on Main level with bedroom/bathroom, Bed/Bath upstairs (has BSC and couch on first level if needed)  Home Access: Stairs to enter with rails  Entrance Stairs - Number of Steps: 3 with one rail: 12 to second level - +4 more  Bathroom Shower/Tub: Tub/Shower unit (on upper level)  Bathroom Equipment: Grab bars in shower, Hand-held shower  Home Equipment: Loman Lusher, rolling, Cane, Long-handled shoehorn, Rollator  ADL Assistance: [address]: Independent  Homemaking Responsibilities: Yes  Ambulation Assistance: Independent (rollator)  Transfer Assistance: Independent  Active : Yes (Son has been assisting and friend can assist)  Education: some college  Occupation: Retired  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  Additional Comments: Per chart review: pt has 3 children - one does not work - pt states she has a friend that can stay with her    OBJECTIVE:   Vision/Hearing:  Vision  Vision: Within Functional Limits (had cataract surgery in 2022)  Hearing: Within functional limits    Cognition/Observation:  Overall Orientation Status: Within Functional Limits    Observation/Palpation  Posture: Good  Observation: pleasant, cooperative, no acute distress noted on RA    ROM:  AROM:  (limited by posterior hip precautions)  PROM:  (limited by posterior hip precautions)    Strength:  Strength: Generally decreased, functional    Neuro:  Balance  Sitting - Static: Good  Sitting - Dynamic: Good  Standing - Static: Fair;+  Standing - Dynamic: Fair       Bed mobility  Rolling to Left: Stand by assistance  Rolling to Right: Stand by assistance  Supine to Sit: Stand by assistance  Sit to Supine: Stand by assistance  Scooting: Stand by assistance  Bed Mobility Comments: pillow between legs to maintain precautions    Transfers  Sit to Stand: Stand by assistance  Stand to sit: Stand by assistance  Bed to Chair: Stand by assistance  Stand Pivot Transfers: Stand by assistance (with 2ww)  Car Transfer:  (limited by time contraints)  Comment: vc's for maintaining hip precautions    Ambulation  WB Status: R LW WBAT; posterior hip precautions  Ambulation  Surface: level tile  Device: Rolling Walker  Assistance: Stand by assistance  Quality of Gait: step through, antalgic  Gait Deviations: Slow Francoise;Decreased step length  Distance: 100ft with multiple turns  Comments: TUG 52sec, 36 seconds Avg=44 seconds=high fall risk    Stairs/Curb  Stairs?: No (limited by time contraints)         Patient Education  Education Given To: Patient  Education Provided: Role of Therapy;Plan of Care;Precautions  Education Provided Comments: reviewed WBAT, hip precautions with examples given, bed mobility with pillow between knees  Education Method: Demonstration;Verbal  Barriers to Learning: None  Education Outcome: Verbalized understanding    Quality Indicators (IRF-MINDI):  Rolling L and R: Supervision or Touching Assistance - 4  Sit>Supine: Supervision or Touching Assistance - 4  Supine>Sit: Supervision or Touching Assistance - 4  Sit>Stand: Supervision or Touching Assistance - 4  Chair/Bed>Chair Transfer: Supervision or Touching Assistance - 4  Car Transfers: Supervision or Touching Assistance - 4  Walk 10 ft: Supervision or Touching Assistance - 4  Walk 50 ft with two 90 degree turns: Supervision or Touching Assistance - 4  Walk 150 ft in [address]: Not attempted due to Medical Condition or Safety Concerns (I.e. unsafe or physician orders) - 80  Walking 10 ft on Unlevel Surface: Not attempted due to Environmental Limitations (i.e. weather, equipment unavailable) - 10  Picking up Objects from Standing Position: Not attempted due to Medical Condition or Safety Concerns (I.e. unsafe or physician orders) - 80  Stairs: No Not attempted due to environmental limitations (i.e. equipment, time) - 10  WC Mobility: No Not Applicable (pt did not complete item prior to admission) - 9    ASSESSMENT:  Body Structures, Functions, Activity Limitations Requiring Skilled Therapeutic Intervention: Decreased functional mobility ; Decreased strength;Decreased balance;Decreased endurance  Decision Making: Medium Complexity  History: high  Exam: med  Clinical Presentation: med    Specific Instructions for Next Treatment: functional situations implementing hip precautions to reinforce carry over  Therapy Prognosis: Good  Barriers to Learning: none     CLINICAL IMPRESSION: Pt demonstrates the above deficits and decline in functional mobility s/p R TATE revision. Pt would benefit from physical therapy to address above deficits and allow for safe return home at highest level of function, decrease risk for falls, and improve QOL.       PLAN OF CARE:  Frequency: 1-2 treatment sessions per day, 5-7 days per week  Specific Instructions for Next Treatment: functional situations implementing hip precautions to reinforce carry over  Plan Comment: Transfer PT POC to supervising rehab PT  Current Treatment Recommendations: Strengthening, Balance training, Functional mobility training, Transfer training, Gait training, Endurance training, Stair training, Neuromuscular re-education, Home exercise program, Safety education & training, Equipment evaluation, education, & procurement, Modalities, Positioning, Patient/Caregiver education & training, Cognitive/Perceptual training, Therapeutic activities    Patient's Goal:  to go home and be indep    GOALS:  Patient goals : to go home and be indep  Long Term Goals  Long term goal 1: Pt will demonstrate bed mobiilty with indep following posterior hip precautions  Long term goal 2: Pt will demonstrate functional transfers including car transfers with indep  Long term goal 3: Pt will amb 150ft with LRAD and indep  Long term goal 4: Pt will negotiate 12 steps with handrail and indep  Long term goal 5: Pt will demonstrate TUG <18 seconds to decrease fall risk    ELOS:   Plan weeks: 1    Therapy Time:    Individual   Time In 1100   Time Out 1130   Minutes Dez, [address] [facility], 07/30/22 at 11:53 AM

## 2022-07-30 NOTE — CONSULTS
Nutrition Assessment     Type and Reason for Visit: Initial, Consult (eval and treat)    Nutrition Recommendations/Plan:   Continue regular diet     Malnutrition Assessment:  Malnutrition Status: No malnutrition    Nutrition Assessment:  Pt currently stable from nutrition standpoint. No recent significant wt loss, good intakes per pt, and no non-healing wounds. Nutrition Related Findings:   PMHx includes anxiety, CAD, COPD. S/p right hip replacement 7/28. No intakes documented though pt reports good intakes here and home. States had vomiting last night, which pt thinks is d/t meds after surgery. Denies N/V/D. Last BM 7/27. Labs and meds reviewed. No edema. Wound Type: Surgical Incision    Current Nutrition Therapies:    ADULT DIET;  Regular    Anthropometric Measures:  Height: 5' 3.5\" (161.3 cm)  Current Body Wt: 179 lb (81.2 kg)   BMI: 31.2    Nutrition Diagnosis:   No nutrition diagnosis at this time     Nutrition Interventions:   Food and/or Nutrient Delivery: Continue Current Diet  Nutrition Education/Counseling: No recommendation at this time  Coordination of Nutrition Care: Continue to monitor while inpatient       Goals:     Goals: Meet at least 75% of estimated needs, PO intake 75% or greater       Nutrition Monitoring and Evaluation:   Behavioral-Environmental Outcomes: None Identified  Food/Nutrient Intake Outcomes: Food and Nutrient Intake  Physical Signs/Symptoms Outcomes: Biochemical Data, Meal Time Behavior, Weight, Skin    Discharge Planning:    No discharge needs at this time     Delmy Yo RD, LD

## 2022-07-30 NOTE — PROGRESS NOTES
Facility/Department: [facility]  Rehabilitation Initial Assessment: Occupational Therapy  Room: R255R255-01    NAME: Maxine Brown  : 1950  MRN: [id number]    Date of Service: 2022    Rehab Diagnosis(es): Impaired mobility and ADL's due to [address]  Patient Active Problem List    Diagnosis Date Noted    Impaired mobility and ADLs 2022    Impaired mobility and activities of daily living [address] 2022    Bilateral carotid artery stenosis 2022    Acquired hypothyroidism 2022    Hyperglycemia 2022    Status post total replacement of right hip 2022    Depressive disorder 2022    Lumbar spondylosis 2021    Chronic mesenteric ischemia ([facility] [address] 07/10/2020    Cervical spondylosis without myelopathy 2013    Cervical spine disease 2018    Migraine 2018    Right leg numbness 2018    Osteoarthritis of left knee 2017    COPD (chronic obstructive pulmonary disease) ([facility] [address] 2017    DDD (degenerative disc disease), lumbar 2016    PADMINI (generalized anxiety disorder) 2015    History of tobacco use 2014    S/P cervical spinal fusion, 5-6 2014    CTS (carpal tunnel syndrome), Ortho 2014    Cervical radiculitis, surgery pending 2014    CAD (coronary artery disease) 2014    Cat bite involving extremity 2013    Cellulitis, improving 2013    Allergic rhinitis, Dr Tara Escobar 10/28/2013    Dyslipidemia 10/28/2013    Hx of cardiac cath x 2, normal, last 9/13 10/07/2013    Fatigue 10/07/2013    Postlaminectomy syndrome, cervical region 2013    Family history of heart disease 2013    Osteoarthritis     Headache     Hernia, hiatal     Anxiety     Myalgia 10/16/2009    Other symptoms referable to back 2007       Past Medical History:   Diagnosis Date    Acquired hypothyroidism 2022    Allergic rhinitis, Dr Tara Escobar 10/28/2013    Anxiety     Back pain     Bilateral carotid artery stenosis 7/29/2022    CAD (coronary artery disease)     Cat bite involving extremity 11/5/2013    Cat bite involving extremity 11/5/2013    Cellulitis 11/5/2013    Cellulitis, improving 11/5/2013    Cervical radiculitis 1/13/2014    Cervical radiculitis, surgery pending 1/13/2014    COPD (chronic obstructive pulmonary disease) ([facility] [address] 1/3/2017    CTS (carpal tunnel syndrome), Ortho 1/13/2014    Depressive disorder 3/25/2022    Dyslipidemia 10/28/2013    Fatigue 10/7/2013    Headache(784.0)     H/O MIGRANES    Hernia, hiatal     H/O     Hx of cardiac cath x 2, normal, last 9/13 10/7/2013    Osteoarthritis     S/P cervical spinal fusion, 5-6 1/13/2014    Seizure disorder ([facility] [address]     Seizure disorder ([facility] [address]     Sinusitis 10/28/2013    Tobacco abuse, quit 9/3/13 10/7/2013    Tobacco abuse, quit 9/3/13      Past Surgical History:   Procedure Laterality Date    APPENDECTOMY      CARDIAC CATHETERIZATION  09/05/2013     [address]    fusion    CHOLECYSTECTOMY      COLONOSCOPY  2009    COSMETIC SURGERY  2010    on eye    JOINT REPLACEMENT Right 08/09/2021    hip    DC TOTAL KNEE ARTHROPLASTY Left 12/05/2017    LEFT TOTAL KNEE ARTHROPLASTY SUPINE BELEN PSI SPINAL (OUTSIDE PAT DR. OLIVA) performed by Gianna Dodson MD at [address] Right 7/28/2022    RIGHT HIP REVISION TOTAL HIP ARTHROPLASTY performed by Luzma Pearl MD at [address] [place] [address]       Restrictions:  Restrictions/Precautions  Restrictions/Precautions: Weight Bearing; Fall Risk  Lower Extremity Weight Bearing Restrictions  Right Lower Extremity Weight Bearing: Weight Bearing As Tolerated  Left Lower Extremity Weight Bearing: Weight Bearing As Tolerated  Position Activity Restriction  Hip Precautions: Posterior hip precautions    Transfer care to lead OTR    Subjective:  General  Referring Practitioner: Dr. María Rosen Scullin  Diagnosis: Impaired mobility and ADL's due to [address]    Patient's date of birth confirmed: Yes     Pain at start of treatment: No    Pain at end of treatment: Yes: 4/10    Location: R hip  Nursing notified: Yes  RN: Milton Craig  Intervention: RN provided pain medication    Social Functional:  Social/Functional History  Lives With: Alone  Type of Home: House  Home Layout: Two level; Able to Live on Main level with bedroom/bathroom; Bed/Bath upstairs (has BSC and couch on first level if needed)  Home Access: Stairs to enter with rails  Entrance Stairs - Number of Steps: 3 with one rail: 12 to second level - +4 more  Bathroom Shower/Tub: Tub/Shower unit  Bathroom Equipment: Grab bars in shower;Hand-held shower  Home Equipment: Reacher;Walker, rolling;Cane;Long-handled shoehorn  ADL Assistance: Independent  Homemaking Assistance: Independent  Ambulation Assistance: Independent (with ww)  Transfer Assistance: Independent  Active : Yes (Son has been assisting and friend can assist)  Education: some college  Occupation: Retired  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[address]: [employer]  Additional Comments: Per chart review: pt has 3 children - one does not work - pt states she has a friend that can stay with her    Objective:    Self Care Status:  ADL  Feeding: Dentures; Modified independent  (upper dentures only)  Grooming: Stand by assistance  Grooming Skilled Clinical Factors: VC's to follow precautions  UE Bathing: Supervision  LE Bathing: Minimal assistance  LE Bathing Skilled Clinical Factors: pt raising L leg to wash feet, assist with R foot, VC's to follow precautions (pt trying to flex leg while seated to reach foot),  UE Dressing: Setup;Supervision  LE Dressing:  Moderate assistance  LE Dressing Skilled Clinical Factors: to don socks B feet, pt able to doff L sock,assist to thread LE into LB clothing  Toileting: Unable to assess(comment)  Toileting Skilled Clinical Factors: pt declined need  Toilet Transfers  Toilet Transfer: Unable to assess  Shower Transfers  Shower - Transfer From: Paresh Knox - Transfer Type: To  Shower - Transfer To: Shower seat with back  Shower - Technique: Ambulating  Shower Transfers: Stand by assistance  Shower Transfers Comments: VC's to follow precautions      Functional Mobility:  Balance  Sitting Balance: Independent  Standing Balance: Stand by assistance  Functional Mobility  Functional - Mobility Device: Rolling Walker  Activity: To/from bathroom  Assist Level: Stand by assistance  Functional Mobility Comments: VC's to not twist at hip for turns    Bed mobility and transfers:  Bed mobility  Supine to Sit: Stand by assistance  Sit to Supine: Stand by assistance  Bed Mobility Comments: VC's to maintain precautions  Transfers  Sit to stand: Stand by assistance  Stand to sit: Stand by assistance      Observation:  Observation/Palpation  Posture: Good  Observation: pleasant, cooperative, no acute distress noted on RA    Orientation and Cognition:  Orientation  Overall Orientation Status: Within Functional Limits  Cognition  Overall Cognitive Status: WFL  Cognition Comment: decreased safety with hip precautions    Patient's cognition will improve or maintain baseline to safely perform ADLs:  Comprehension: Supervision  Expression: Independent  Social Interaction: Independent  Problem Solving: Supervision  Memory: Mod I    The patient completed the [address] [place] [address] ([facility]) Examination on this date, which is a useful screening tool for detecting mild cognitive impairment and signs of dementia.   Range of impairments based on score are indicated in the chart below:      High school education  Scoring Less than High School Education   27-30 Normal 25-30   21-26 Mild Neurocognitive disorder 20-24   1-20 Dementia 1-19     Patient's level of education: some college  Patient scored 24/30 on this date, which indicates a possible mild neurocognitive disorder. Results of [facility] assessment will be shared in team meeting to assess need for further action. Electronically signed by: MERARY Vaca/L    7/30/2022, 12:28 PM       Vision and Hearing:  Vision  Vision: Within Functional Limits (had cataract surgery in April 2022)  Hearing  Hearing: Within functional limits  Perception  Overall Perceptual Status: WFL    Range of Motion:  LUE AROM (degrees)  LUE AROM : WFL  Left Hand AROM (degrees)  Left Hand AROM: WFL  RUE AROM (degrees)  RUE AROM : WFL  Right Hand AROM (degrees)  Right Hand AROM: WFL      Strength:  LUE Strength  L Hand General: 5/5  LUE Strength Comment: 5/5  RUE Strength  R Hand General: 5/5  RUE Strength Comment: 5/5    Quality of Movement: Tone RUE  RUE Tone: Normotonic  Tone LUE  LUE Tone: Normotonic  Coordination  Movements Are Fluid And Coordinated: Yes    Sensation:  Sensation  Overall Sensation Status: WFL    Hand Dominance  Hand Dominance: Right    Safety:  Safety Devices  Type of Devices: All fall risk precautions in place    Assessment:  Activity Tolerance  Activity Tolerance: Patient Tolerated treatment well    Assessment  Performance deficits / Impairments: Decreased functional mobility ; Decreased ADL status; Decreased safe awareness;Decreased high-level IADLs  Assessment: Pt is a 80 y.o. female s/p RIGHT HIP REVISION TOTAL HIP ARTHROPLASTY. Pt with VC's after education on precautions to maintain during ADL's. Pt may benefit from OT services to address above mentioned deficits and maximize safety and function during ADL's.   Prognosis: Good  Decision Making: Medium Complexity  History: multi comorb  Exam: 4 perf imp  Assistance / Modification: Min A  Discharge Recommendations: Continue to assess pending progress  Plan  REQUIRES OT FOLLOW-UP: Yes    Equipment needed:  OT Equipment Recommendations  Equipment Needed: Yes  Mobility Devices: ADL Assistive Devices  ADL Assistive Devices: Reacher;Sock-Aid Hard;Long-handled Sponge    OT Education:  Education Given To: Patient  Education Provided: Role of Therapy, Plan of Care, Precautions, Equipment, ADL Function  Education Method: Verbal  Barriers to Learning: None  Education Outcome: Verbalized understanding    Plan:  Plan  Times per Week: 5-7  Plan Weeks: 1  Current Treatment Recommendations: Functional mobility training;Neuromuscular re-education; Safety education & training;Patient/Caregiver education & training;Equipment evaluation, education, & procurement;Self-Care / ADL; Home management training    Goals:  Patient goals : \"To get out of here. \"    Long Term Goal 1: Pt will be IND wtih precautions during ADL's. Long Term Goal 2: Pt will be Mod IND with LB dressing.    - Patient will complete self care as followed using the recommended adaptive equipment and/or adaptive techniques as instructed:  Feeding: Mod I  Grooming: Independent  Bathing: Mod I  UE Dressing: Independent  LE Dressing: Mod I  Toileting: Independent  Toilet Transfer: Mod I  Shower/Tub Transfer: Mod I  - Patient will perform basic room mobility at Mod IND level. - Patient and/or caregiver will demonstrate understanding of hip precautions with out verbal/tactile cues. - Patient's cognition will improve or maintain baseline to safely perform ADLs:  Comprehension: Independent  Expression: Independent  Social Interaction: Independent  Problem Solving: Mod I  Memory: Mod I     Therapy Time:   Individual   Time In 0800   Time Out 0905   Minutes 65       Eval: 65 minutes     Electronically signed by:     NELSY Bazzi,   7/30/2022, 12:26 PM

## 2022-07-30 NOTE — H&P
HISTORY & PHYSICAL       DATE OF ADMISSION:  7/29/2022    DATE OF SERVICE:  7/30/22    Subjective:    Dayan Domínguez, 80 y.o. female presents today with:     CHIEF COMPLAINT:    Patient is at [facility] recovering from right hip revision from a previous right total hip replacement. Her surgery is under the care of Dr. Brenda Roberts as detailed below:  Date: 07/28/22 Room / Location: [address]       Anesthesia Start: 1330 Anesthesia Stop: 8372     Procedure: RIGHT HIP REVISION TOTAL HIP ARTHROPLASTY (Right: Hip) Diagnosis:      Instability of hip joint, right      (RIGHT HIP UNSTABLE TOTAL HIP)     Surgeons: Shaunna Sutherland MD Responsible Provider: Mary Anne Jj MD     Anesthesia Type: spinal ASA Status: 3   Tells me that she had a good relief from her initial right hip prosthesis approximately a year ago however she has had it go out of position twice on her and therefore required a right hip revision. Postoperatively since this is a more involved surgery she is having increased pain and inability to ambulate. She tells me that she was seen twice in the past year for dislocations relocations the most recent being in the emergency room on 7/20/2022 for right hip dislocation that was successfully reduced in the OR. She was discharged home with [place] and planned surgery as above. She was found to have mobility ADL and pain management and complex nursing issue management including bowel management and incisional care requiring acute level rotation. Her goal is for discharge within 7 to 10 days home alone with help from family. Hip Pain   The incident occurred more than 1 week ago. The incident occurred at home. The injury mechanism was a fall. The pain is present in the right hip and right thigh. The pain is at a severity of 9/10. The pain is severe. The pain has been Fluctuating since onset. Associated symptoms include a loss of sensation and muscle weakness. Possible foreign bodies include metal. The symptoms are aggravated by movement, palpation and weight bearing. She has tried elevation, ice, acetaminophen, immobilization, non-weight bearing and rest for the symptoms. The treatment provided moderate relief. I reviewed recent nursing note, \" Patient arrived to unit prior to 1900. Admission completed with the exception of consents as patient wants to sign in AM. Cell phone/  at bedside. Patient has upper dentures a few pieces of clothing, shoes and her personal cane from home. Assessment completed. VSS. Medicated with IV toradol. Patient declined scheduled tylenol and prn jericho as patient reports \"something is tearing up my stomach. \" She tells me she doesn't take tylenol at home and voices that jericho made her sick night prior. Pain 8/10 to right hip. Offered ice and heating pad, patient declined both offers. Aquacell dressing is c/d/I to hip. Patient WBAT. LBM 7/27. Snack provided as well as as hot tea. No distress noted. Call light within reach and bed alarm activated. Consents signed and placed in blue chart. Patient declined scheduled tylenol stating, \"I don't want to take anything more than I have to. \" Requested dose of IV toradol. Patient reports effective throughout night. \". The patient has stabilized medically and is able to participate at acute level rehab but is too medically complex for SNF due to need for therapy at the acute level with at least 15 hours a week of PT OT and cognitive and recreational therapy at an acute level with daily medical monitoring. Imaging:  Imaging and other studies reviewed and discussed with patient and staff    XR HIP RIGHT ( 7/28/2022  Unremarkable alignment of the right hip prosthesis, no evidence of lucency surrounding the prosthesis. No evidence of cortical irregularity or lucency to suggest a fracture, no evidence of lytic or sclerotic bone lesion is seen.  Mild degenerative bone changes are seen. The overlying soft tissues are unremarkable. Unremarkable alignment of the right hip prosthesis, no evidence of complications. XR HIP RIGHT   7/20/2022   Single frontal view of the right hip demonstrates reduction of the previously noted dislocation of the right hip prosthesis. No other significant new nor acute osseous lesion has developed. REDUCTION OF PREVIOUSLY NOTED DISLOCATION OF RIGHT HIP PROSTHESIS. XR HIP RIGHT 7/20/2022   There is a right hip prosthesis. There is superolateral dislocation of the femoral head component relative to the acetabular cup. Remaining osseous structures are otherwise grossly intact. There is no other fracture no destructive osseous lesion. Pelvic calcifications may represent phleboliths. RIGHT HIP ARTHROPLASTY. SUPERIOR DISLOCATION OF THE FEMORAL HEAD COMPONENT RELATIVE TO THE ACETABULAR CUP.            Labs:    Labs reviewed and discussed with patient and staff    No results found for: POCGLU  Lab Results   Component Value Date/Time     07/29/2022 05:43 AM    K 4.1 07/29/2022 05:43 AM     07/29/2022 05:43 AM    CO2 23 07/29/2022 05:43 AM    BUN 14 07/29/2022 05:43 AM    CREATININE 0.64 07/29/2022 05:43 AM    CALCIUM 8.7 07/29/2022 05:43 AM    LABALBU 4.1 07/20/2022 09:15 AM    LABALBU 4.7 09/29/2011 10:44 AM    BILITOT 0.3 07/20/2022 09:15 AM    ALKPHOS 76 07/20/2022 09:15 AM    AST 18 07/20/2022 09:15 AM    ALT 13 07/20/2022 09:15 AM     Lab Results   Component Value Date/Time    WBC 5.0 07/29/2022 05:42 AM    RBC 3.59 07/29/2022 05:42 AM    RBC 4.21 09/29/2011 10:44 AM    HGB 10.4 07/29/2022 05:42 AM    HCT 31.4 07/29/2022 05:42 AM    MCV 87.5 07/29/2022 05:42 AM    MCH 29.0 07/29/2022 05:42 AM    MCHC 33.2 07/29/2022 05:42 AM    RDW 13.5 07/29/2022 05:42 AM     07/29/2022 05:42 AM    MPV 9.6 04/06/2015 12:08 PM     Lab Results   Component Value Date/Time    VITD25 19.8 05/13/2017 09:48 AM     Lab Results   Component Value Date/Time COLORU Yellow 2022 01:42 PM    NITRU Negative 2022 01:42 PM    GLUCOSEU Negative 2022 01:42 PM    KETUA TRACE 2022 01:42 PM    UROBILINOGEN 0.2 2022 01:42 PM    BILIRUBINUR Negative 2022 01:42 PM    BILIRUBINUR small 2021 02:00 PM     Lab Results   Component Value Date/Time    PROTIME 13.3 2022 09:15 AM    PROTIME 9.7 2011 08:25 PM     Lab Results   Component Value Date/Time    INR 1.0 2022 09:15 AM           The patient remains highly medically complex and continues to have severe problems with activities of daily living and mobility. The patient was assessed to be able to tolerate intensive rehabilitation and therefore was admitted to Rehabilitation to address these needs.     The patient has been found to have severe abnormality of gait and mobility with impaired self care and is admitted to the acute inpatient rehab program.       Prior Function; everyday activities:     Social History     Socioeconomic History    Marital status: Single     Spouse name: Not on file    Number of children: 3    Years of education: Not on file    Highest education level: Not on file   Occupational History    Occupation: Homemaker   Tobacco Use    Smoking status: Former     Packs/day: 0.50     Years: 20.00     Pack years: 10.00     Types: Cigarettes     Quit date: 2013     Years since quittin.9    Smokeless tobacco: Never   Vaping Use    Vaping Use: Every day   Substance and Sexual Activity    Alcohol use: No    Drug use: Never    Sexual activity: Not on file   Other Topics Concern    Not on file   Social History Narrative    She is retired Human Services-    Lives With: Alone    Type of Home: House in  [place]: [address] to Live on Main level with bedroom/bathroom, Bed/Bath upstairs (has BSC and couch on first level if needed)    Home Access: Stairs to enter with [address] - Number of Steps: 3 with one rail: 12 to second level - +4 more    Bathroom Shower/Tub: Tub/Shower unit    Bathroom Equipment: Grab bars in shower, Hand-held shower    Home Equipment: Pegge Jemal, rolling, Cane, Long-handled shoehorn    Has the patient had two or more falls in the past year or any fall with injury in the past year?:  (Has dislocated hip x2)    ADL Assistance: Independent    Homemaking Assistance: Independent    Ambulation Assistance: Independent (with ww)    Transfer Assistance: Independent    Active : Yes (Son has been assisting and friend can assist)    Additional Comments: has 3 children - one does not work - pt states she has a friend that can stay with her initially and she will stay on first level if d/c is to home                  Social Determinants of Health     Financial Resource Strain: Not on file   Food Insecurity: Not on file   Transportation Needs: Not on file   Physical Activity: Not on file   Stress: Not on file   Social Connections: Not on file   Intimate Partner Violence: Not on file   Housing Stability: Not on file     Social supports listed above.       Prior Device(s) used:  As above    History of falls:  Rarely falls    In depth analysis of complex functional data; the patient has been:    Current Rehabilitation Assessments:  Physical Therapy:   Bed mobility:  Bed mobility  Rolling to Left: Stand by assistance (07/30/22 1138)  Rolling to Right: Stand by assistance (07/30/22 1138)  Supine to Sit: Stand by assistance (07/31/22 1146)  Sit to Supine: Stand by assistance (07/31/22 1146)  Scooting: Stand by assistance (07/30/22 1138)  Bed Mobility Comments: pillow between legs to maintain precautions (07/30/22 1138)  Transfers:  Transfers  Sit to Stand: Stand by assistance (07/30/22 1410)  Stand to sit: Stand by assistance (07/30/22 1410)  Bed to Chair: Stand by assistance (07/30/22 1138)  Stand Pivot Transfers: Stand by assistance (with 2ww) (07/30/22 1138)  Car Transfer: Stand by assistance (VCs for technique and maintaining hip prec.) (07/30/22 1155)  Comment: vc's for maintaining hip precautions (07/30/22 1138)  Gait:   Ambulation  WB Status: R LE WBAT; posterior hip precautions (07/31/22 1126)  Ambulation  Surface: carpet;level tile (07/31/22 1126)  Device: Rolling Walker (07/31/22 1126)  Assistance: Stand by assistance (07/31/22 1126)  Quality of Gait: antalgic gait, VCs to decreased twisting with change of direction (07/31/22 1126)  Gait Deviations: Slow Francoise;Decreased step length (07/31/22 1126)  Distance: 150ft x2 (07/31/22 1126)  Comments: TUG 52sec, 36 seconds Avg=44 seconds=high fall risk (07/30/22 1139)  More Ambulation?: Yes (07/31/22 1126)  Ambulation 2  Surface - 2: level tile;carpet (07/31/22 1126)  Device 2: Single point cane (07/31/22 1126)  Assistance 2: Stand by assistance (07/31/22 1126)  Quality of Gait 2: Step through patter w/ good HS, reduce Rt step length likely D/T limited Rt hip extension mobility, no LOB or unsteadiness observed (07/31/22 1126)  Distance: 75'x2 (07/31/22 1126)  Stairs:  Stairs/Curb  Stairs?: No (limited by time contraints) (07/30/22 1139)  Stairs  # Steps : 12 (07/31/22 1126)  Stairs Height: 6\" (07/31/22 1126)  Rails: Bilateral (07/30/22 1148)  Device: No Device (07/31/22 1126)  Assistance: Stand by assistance (07/31/22 1126)  Comment: Vc's and demo for proper sequencing. Pt able to ascend/descend recip w/ use of B HR's, NR w/ x1 HR. (07/31/22 1126)  W/C mobility:       Occupational Therapy:   Hand Dominance: Right  ADL  Feeding: Dentures; Modified independent  (upper dentures only) (07/30/22 0806)  Grooming: Stand by assistance (07/30/22 0806)  Grooming Skilled Clinical Factors: VC's to follow precautions (07/30/22 0806)  UE Bathing: Supervision (07/30/22 0806)  LE Bathing: Minimal assistance (07/30/22 0806)  LE Bathing Skilled Clinical Factors: pt raising L leg to wash feet, assist with R foot, VC's to follow precautions (pt trying to flex leg while seated to reach foot), (07/30/22 8796)  UE Dressing: Setup;Supervision (07/30/22 9347)  LE Dressing: Moderate assistance (07/30/22 0806)  LE Dressing Skilled Clinical Factors: to don socks B feet, pt able to doff L sock,assist to thread LE into LB clothing (07/30/22 0806)  Toileting: Unable to assess(comment) (07/30/22 0806)  Toileting Skilled Clinical Factors: pt declined need (07/30/22 0806)  Toilet Transfers  Toilet Transfer: Unable to assess (07/30/22 1219)     Shower Transfers  Shower - Transfer From: Calderon Cam (07/30/22 1219)  Shower - Transfer Type: To (07/30/22 1219)  Shower - Transfer To: Shower seat with back (07/30/22 1219)  Shower - Technique: Ambulating (07/30/22 1219)  Shower Transfers: Stand by assistance (07/30/22 1219)  Shower Transfers Comments: VC's to follow precautions (07/30/22 1219)    Speech Therapy:            Diet/Swallow:                         COGNITION:  OT: Cognition Comment: decreased safety with hip precautions  SP:          Prior to admission patient was independent with all ADLs and mobilityand did not require any outside services.        Past Medical History:   Diagnosis Date    Acquired hypothyroidism 7/29/2022    Allergic rhinitis, Dr Yanni Jacobs 10/28/2013    Anxiety     Back pain     Bilateral carotid artery stenosis 7/29/2022    CAD (coronary artery disease)     Cat bite involving extremity 11/5/2013    Cat bite involving extremity 11/5/2013    Cellulitis 11/5/2013    Cellulitis, improving 11/5/2013    Cervical radiculitis 1/13/2014    Cervical radiculitis, surgery pending 1/13/2014    COPD (chronic obstructive pulmonary disease) ([facility] 1/3/2017    CTS (carpal tunnel syndrome), Ortho 1/13/2014    Depressive disorder 3/25/2022    Dyslipidemia 10/28/2013    Fatigue 10/7/2013    Headache(784.0)     H/O MIGRANES    Hernia, hiatal     H/O     Hx of cardiac cath x 2, normal, last 9/13 10/7/2013    Osteoarthritis     S/P cervical spinal fusion, 5-6 1/13/2014    Seizure disorder ([address]     Seizure disorder ([facility] Sinusitis 10/28/2013    Tobacco abuse, quit 9/3/13 10/7/2013    Tobacco abuse, quit 9/3/13        Past Surgical History:   Procedure Laterality Date    APPENDECTOMY      CARDIAC CATHETERIZATION  09/05/2013     [address]    fusion    CHOLECYSTECTOMY      COLONOSCOPY  2009    COSMETIC SURGERY  2010    on eye    JOINT REPLACEMENT Right 08/09/2021    hip    CA TOTAL KNEE ARTHROPLASTY Left 12/05/2017    LEFT TOTAL KNEE ARTHROPLASTY SUPINE BELEN PSI SPINAL (OUTSIDE PAT DR. OLIVA) performed by Bryan Barnes MD at [address] Right 7/28/2022    RIGHT HIP REVISION TOTAL HIP ARTHROPLASTY performed by Sushant Vazquez MD at [facility] Facility-Administered Medications   Medication Dose Route Frequency Provider Last Rate Last Admin    traMADol (ULTRAM) tablet 50 mg  50 mg Oral 4x Daily AC & HS Lauren Joseph MD   50 mg at 08/01/22 2609    Vitamin D (CHOLECALCIFEROL) tablet 2,000 Units  2,000 Units Oral Dinner Armida Scullin, DO   2,000 Units at 07/31/22 1702    cyanocobalamin injection 1,000 mcg  1,000 mcg IntraMUSCular Weekly Armida Scullin, DO   1,000 mcg at 07/30/22 0854    coenzyme Q10 capsule 100 mg  100 mg Oral Daily Armida Scullin, DO   100 mg at 07/31/22 7994    acetaminophen (TYLENOL) tablet 650 mg  650 mg Oral Q4H PRN Armida Scullin, DO   650 mg at 07/31/22 2109    bisacodyl (DULCOLAX) suppository 10 mg  10 mg Rectal Daily PRN Lonn Shuqualak, DO        fleet rectal enema 1 enema  1 enema Rectal Daily PRN Armida Scullin, DO        aspirin EC tablet 81 mg  81 mg Oral BID Lyly Lamer Dials, APRN - CNS   81 mg at 07/31/22 2111    escitalopram (LEXAPRO) tablet 5 mg  5 mg Oral Daily Lyly Lamer Dials, APRN - CNS   5 mg at 07/31/22 0906    gabapentin (NEURONTIN) capsule 300 mg  300 mg Oral BID Lyly Lamer Dials, APRN - CNS   300 mg at 07/31/22 2110    gabapentin (NEURONTIN) capsule 300 mg  300 mg Oral Nightly Lyly Lamer CUCA Jasmine - CNS   300 mg at 07/31/22 2111    cetirizine (ZYRTEC) tablet 10 mg  10 mg Oral Daily Alexandra Angela CATE JasmineN - CNS   10 mg at 07/31/22 8944    rosuvastatin (CRESTOR) tablet 10 mg  10 mg Oral Nightly Alexandra Angela Kenroy, APRN - CNS   10 mg at 07/31/22 2110    topiramate (TOPAMAX) tablet 25 mg  25 mg Oral BID Alexandra Angela Jasmine APRN - CNS        0.9 % sodium chloride infusion   IntraVENous PRN CUCA Huang - DIANA        acetaminophen (TYLENOL) tablet 650 mg  650 mg Oral Q6H CUCA Huang - CNP   650 mg at 08/01/22 9232    LORazepam (ATIVAN) tablet 1 mg  1 mg Oral TID PRN CUCA Huang - CNP   1 mg at 07/31/22 2111    ondansetron (ZOFRAN-ODT) disintegrating tablet 4 mg  4 mg Oral Q8H PRN CUCA Huang CNP        Or    ondansetron (ZOFRAN) injection 4 mg  4 mg IntraVENous Q6H PRN CUCA Huang - DIANA        oxyCODONE (ROXICODONE) immediate release tablet 5 mg  5 mg Oral Q4H PRN CUCA Huang CNP   5 mg at 07/31/22 1938    phenol 1.4 % mouth spray 1 spray  1 spray Mouth/Throat Q4H PRN CUCA Huang CNP        sennosides-docusate sodium (SENOKOT-S) 8.6-50 MG tablet 1 tablet  1 tablet Oral BID CUCA Huang - CNP   1 tablet at 07/31/22 2111    sodium chloride flush 0.9 % injection 5-40 mL  5-40 mL IntraVENous 2 times per day CUCA Huang CNP   10 mL at 07/31/22 0906    ketorolac (TORADOL) injection 15 mg  15 mg IntraVENous Q6H PRN CUCA Vasques - CNP   15 mg at 07/31/22 0455       Allergies   Allergen Reactions    Doxycycline     Ibandronic Acid     Keflex [Cephalexin] Nausea Only    Lidocaine     Nickel     Prednisone      Other reaction(s):  Other: See Comments  Pt. had chest pain and red facial rash      Shellfish-Derived Products     Simvastatin      Other reaction(s): Unknown    Tetracyclines & Related     Augmentin [Amoxicillin-Pot Clavulanate] Nausea And Vomiting                      FAMILY HISTORY: Does not pertain to chief complaint. Review of Systems       Objective  /86   Pulse 65   Temp 98.4 °F (36.9 °C) (Oral)   Resp 12   Ht 5' 3.5\" (1.613 m)   Wt 179 lb 7.3 oz (81.4 kg)   LMP  (LMP Unknown)   SpO2 94%   BMI 31.29 kg/m² *    Physical Exam  Constitutional:       General: She is not in acute distress. Appearance: She is well-developed. She is not toxic-appearing or diaphoretic. HENT:      Head: Normocephalic. Not macrocephalic and not microcephalic. No raccoon eyes or Eugene's sign. Mouth/Throat:      Pharynx: Oropharyngeal exudate present. Eyes:      General: No scleral icterus. Right eye: No discharge. Left eye: No discharge. Conjunctiva/sclera: Conjunctivae normal.      Pupils: Pupils are equal, round, and reactive to light. Neck:      Thyroid: No thyromegaly. Vascular: Normal carotid pulses. No carotid bruit, hepatojugular reflux or JVD. Trachea: No tracheal deviation. Pulmonary:      Effort: Pulmonary effort is normal.      Breath sounds: No stridor. Decreased breath sounds present. Musculoskeletal:      Cervical back: Normal range of motion. Tenderness present. Spinous process tenderness and muscular tenderness present. Thoracic back: Tenderness present. Decreased range of motion. Lumbar back: Tenderness present. Decreased range of motion. Right hip: Deformity, laceration, tenderness and bony tenderness present. Decreased range of motion. Decreased strength. Legs:    Skin:     General: Skin is warm and dry. Coloration: Skin is pale. Findings: Bruising and wound present. Comments: Old IV sites   Neurological:      Sensory: Sensory deficit present. Coordination: Coordination abnormal.      Gait: Gait abnormal.      Deep Tendon Reflexes:      Reflex Scores:       Tricep reflexes are 1+ on the right side and 1+ on the left side.        Bicep reflexes are 1+ on the right side and 1+ on the participate in acute intensive comprehensive inpatient rehabilitation program.  Therapeutic modifications regarding activities in therapies, place, amount of time per day and intensity of therapy made daily. Enroll in acute course of therapy program to include 1 1/2 hours per day of PT 5 to 7days per week and 1 1/2 hours per day of OT 5 to 7 days per week,     and Rec T 1/2 hour per day 3-5 days per week. The patient is stable medically and physically on previous exam.  If deemed necessary therapy will be spread out over a 7-day window. This patient presented with significant new onset decreased mobility and inability to perform activities of daily living skills independently and is at significant risk for prolonged disability  For this reason they have been admitted to [address] [place] [address]. The patient's current functional and medical status are highly complex but the patient is able to participate in intensive rehabilitation. A comprehensive inpatient rehabilitation program is appropriate. The patient will undergo initial evaluation by the rehabilitation team and be discussed at regular treatment team meetings to assess progress, mobility, self care, mood and discharge issues. Physical therapy will be consulted for mobility and endurance issues and should be performed 1 to 2 times per day, 7 days per week for the length of stay. Occupational therapy will be consulted for activities of daily living and should be performed 1 to 2 times per day, 7 days per week for the length of stay. Their capacity to participate at an acute level, decision to be treated in the gym, room or on the unit, their activity goals for the day and the number of minutes of active participation will be reassessed and re-prescribed daily. Because this patient is medically complex, I will check a CBC, BMP, UA and orthostatic blood pressures.   They will be reassessed daily regarding their ability to participate in an acute level rehabilitation program.  Recreational Therapy will be consulted for community re-entry and adjustment to disability. Communication, cognitive and emotional issues will also be addressed during this rehabilitation stay by rehabilitation psychologist or speech therapist as appropriate. I reviewed the patient's old and current charts and discussed other rehabilitation options with the rehabilitation team including the rehab RN and the admission team as well as the patient. I feel that the patient's functional recovery would be best served at an acute inpatient rehabilitation program because the patient needs intense therapy three hours per day, direct RN supervision and daily monitoring by a physician for medical status. This cannot be sufficiently provided by home health care, a skilled nursing facility or in an outpatient setting. I further feel that the patient has the potential to improve functional abilities in an acute intensive rehabilitation program.    Old records were reviewed and summarized.     2.  Other diagnoses which complicate rehabilitation stay include:     Principal Problem:    Impaired mobility and activities of daily living dt RIGHT HIP REVISION TOTAL HIP ARTHROPLASTY  Active Problems:    Status post total replacement of right hip-strict hip precautions as this is her first revision and hit her hip popped out twice over the past year.-Follow-up with Dr. Nai Santana as an outpatient     PADMINI (generalized anxiety disorder), Depressive disorder-emotional support provided daily, vitamin B12, encourage participation in rehabilitation support group and recreational therapy, adjust/add medications (Lexapro, Ativan, add vitamin D)   Cervical spondylosis without myelopathy Cervical radiculitis, DDD (degenerative disc disease), lumbar,  Postlaminectomy syndrome, cervical region, Myalgia-lowest effective dose of pain medication, treat depression, titrate Neurontin and Topamax  HTN, CAD (coronary artery disease)-Acute rehab to monitor heart rate and rhythm with the option of telemetry and the effects of chronotropic medication with respect to increasing physical activity and exercise in PT, OT, ADLs with medication titration to lowest effective dosing. Continue blood signs every shift focusing on heart rate, rhythm and blood pressure checks with orthostatic checks-monitoring the effect of exercise, therapy and posture. Consult hospitalist for backup medical and adjust/add medications (Crestor). Monitor heart rate and blood pressure as well as medications effects on vital signs before during and after therapy with especial focus on preventing orthostasis and falls risk. COPD (chronic obstructive pulmonary disease)-Acute rehab for endurance traing with Pulse Ox to monitoring oxygen saturation and heart rate with O2 titration to lowest effective dose. Pulse oximeter checks to shift and at HS to dose and titrate oxygen and aerosol treatments monitor for nocturnal hypoxemia, monitor vital signs, oxygen prn. Focus on energy conservation. I am especially concerned about their recent medical complexities. The patient's high risk medication use includes opiates for pain. The patient is high risk for urinary tract infection, an admission urinalysis has been ordered. I will have the nurses check post void residual bladder volumes and place acatheter if excessive urine is retained in the bladder after voiding. The patient is risk for deep venous thromosis, complex deep venous thrombosis protocol prophylaxis has been ordered  -no anticoagulation at this point we will check with Ortho regarding if it is okay to start her on Lovenox. The patient is high risk for orthostasis and a hydration program and orthostatic blood pressure screening have been ordered. I will attempt to get old records from the patient's previous hospital stay.   Care everywhere on EPIC was utilized. 3.  Current and previous medications were reviewed and summarized and compared to old medication lists from home and from the acute floor. 4.  Complex labs and x-rays were reviewed. I will review patient's old EKG and labs. 5.  Will provide emotional support for this patient regarding adjustment to their disability. Cognition and mood will be screened daily and addressed by rehabilitation psychology and/or speech therapy as appropriate. I have encouraged the patient to attend the Rehab Adjustment to Disability Support Group and recreational therapy. 6.  Estimated length of stay is 1- 2 weeks. Discharge to home with help from family and home health PT, OT, RN, and aide. Patient should be independent at discharge. 7.  The patient's medical and rehab prognosis are good. [address] regarding the patient's back up to general medical needs. A welcome letter was presented with an explanation of my services, my specialty and what to expect during the rehabilitation process. As well as introducing myself, I also wrote my name on their bedside marker board with their name as well as the names of the other physicians with an explanation of our individual roles in their care, as well as the rehab process.             Gabriel Conteh D.O., F.A.A.P.DEBO.&DUSTY.

## 2022-07-31 LAB — MRSA CULTURE ONLY: NORMAL

## 2022-07-31 PROCEDURE — 97110 THERAPEUTIC EXERCISES: CPT

## 2022-07-31 PROCEDURE — 1180000000 HC REHAB R&B

## 2022-07-31 PROCEDURE — 6370000000 HC RX 637 (ALT 250 FOR IP): Performed by: CLINICAL NURSE SPECIALIST

## 2022-07-31 PROCEDURE — 6370000000 HC RX 637 (ALT 250 FOR IP): Performed by: PHYSICAL MEDICINE & REHABILITATION

## 2022-07-31 PROCEDURE — 97116 GAIT TRAINING THERAPY: CPT

## 2022-07-31 PROCEDURE — 6370000000 HC RX 637 (ALT 250 FOR IP): Performed by: NURSE PRACTITIONER

## 2022-07-31 PROCEDURE — 2580000003 HC RX 258: Performed by: NURSE PRACTITIONER

## 2022-07-31 PROCEDURE — 6360000002 HC RX W HCPCS: Performed by: NURSE PRACTITIONER

## 2022-07-31 RX ORDER — TRAMADOL HYDROCHLORIDE 50 MG/1
50 TABLET ORAL
Status: DISCONTINUED | OUTPATIENT
Start: 2022-07-31 | End: 2022-08-03 | Stop reason: HOSPADM

## 2022-07-31 RX ADMIN — ACETAMINOPHEN 650 MG: 325 TABLET ORAL at 04:55

## 2022-07-31 RX ADMIN — ESCITALOPRAM OXALATE 5 MG: 10 TABLET ORAL at 09:06

## 2022-07-31 RX ADMIN — KETOROLAC TROMETHAMINE 15 MG: 15 INJECTION, SOLUTION INTRAMUSCULAR; INTRAVENOUS at 04:55

## 2022-07-31 RX ADMIN — ACETAMINOPHEN 650 MG: 325 TABLET ORAL at 17:02

## 2022-07-31 RX ADMIN — SENNOSIDES AND DOCUSATE SODIUM 1 TABLET: 50; 8.6 TABLET ORAL at 09:05

## 2022-07-31 RX ADMIN — Medication 100 MG: at 09:05

## 2022-07-31 RX ADMIN — ASPIRIN 81 MG: 81 TABLET, COATED ORAL at 21:11

## 2022-07-31 RX ADMIN — GABAPENTIN 300 MG: 300 CAPSULE ORAL at 21:11

## 2022-07-31 RX ADMIN — CETIRIZINE HYDROCHLORIDE 10 MG: 10 TABLET, FILM COATED ORAL at 09:05

## 2022-07-31 RX ADMIN — ACETAMINOPHEN 650 MG: 325 TABLET ORAL at 12:19

## 2022-07-31 RX ADMIN — Medication 10 ML: at 09:06

## 2022-07-31 RX ADMIN — GABAPENTIN 300 MG: 300 CAPSULE ORAL at 09:05

## 2022-07-31 RX ADMIN — OXYCODONE 5 MG: 5 TABLET ORAL at 19:38

## 2022-07-31 RX ADMIN — TRAMADOL HYDROCHLORIDE 50 MG: 50 TABLET, COATED ORAL at 17:01

## 2022-07-31 RX ADMIN — LORAZEPAM 1 MG: 1 TABLET ORAL at 21:11

## 2022-07-31 RX ADMIN — SENNOSIDES AND DOCUSATE SODIUM 1 TABLET: 50; 8.6 TABLET ORAL at 21:11

## 2022-07-31 RX ADMIN — GABAPENTIN 300 MG: 300 CAPSULE ORAL at 21:10

## 2022-07-31 RX ADMIN — ASPIRIN 81 MG: 81 TABLET, COATED ORAL at 09:05

## 2022-07-31 RX ADMIN — Medication 2000 UNITS: at 17:02

## 2022-07-31 RX ADMIN — ROSUVASTATIN CALCIUM 10 MG: 10 TABLET, FILM COATED ORAL at 21:10

## 2022-07-31 RX ADMIN — ACETAMINOPHEN 650 MG: 325 TABLET, FILM COATED ORAL at 21:09

## 2022-07-31 ASSESSMENT — PAIN SCALES - GENERAL
PAINLEVEL_OUTOF10: 5
PAINLEVEL_OUTOF10: 8
PAINLEVEL_OUTOF10: 2
PAINLEVEL_OUTOF10: 5
PAINLEVEL_OUTOF10: 6
PAINLEVEL_OUTOF10: 7

## 2022-07-31 ASSESSMENT — PAIN - FUNCTIONAL ASSESSMENT
PAIN_FUNCTIONAL_ASSESSMENT: PREVENTS OR INTERFERES SOME ACTIVE ACTIVITIES AND ADLS
PAIN_FUNCTIONAL_ASSESSMENT: ACTIVITIES ARE NOT PREVENTED
PAIN_FUNCTIONAL_ASSESSMENT: PREVENTS OR INTERFERES SOME ACTIVE ACTIVITIES AND ADLS

## 2022-07-31 ASSESSMENT — PAIN DESCRIPTION - LOCATION
LOCATION: HIP

## 2022-07-31 ASSESSMENT — PAIN DESCRIPTION - ORIENTATION
ORIENTATION: RIGHT
ORIENTATION: LEFT
ORIENTATION: RIGHT

## 2022-07-31 ASSESSMENT — PAIN DESCRIPTION - DESCRIPTORS
DESCRIPTORS: ACHING
DESCRIPTORS: ACHING
DESCRIPTORS: ACHING;SHARP
DESCRIPTORS: ACHING

## 2022-07-31 NOTE — PROGRESS NOTES
Assumed care of pt at [facility] on 7/30/22. Pt had revision of right hip on 7/28. Aquacell in place to right hip with no drainage noted. Pt took hs meds and requested po Ativan 1 mg. Pt walked to bathroom with walker and one assist. Pt states that she thinks she needs to have bm but does not want to take anything tonight. Last BM was 7/27/22. Complained of  8/10 pain when back in bed and requested iv Toradol 15mg at 2131. Pt refused topomax po as ordered at  and stated that she is no longer on that med at home and it needs to be removed from the orders. Swelling noted to right hip and thigh. Ice to right hip and pt repositioned in bed. Call light in reach and bed alarm on Electronically signed by Zahra Domínguez.  Landon Belle on 7/31/2022 at 1:12 AM

## 2022-07-31 NOTE — PLAN OF CARE
Problem: Discharge Planning  Goal: Discharge to home or other facility with appropriate resources  Outcome: Progressing     Problem: Discharge Planning  Goal: Discharge to home or other facility with appropriate resources  Outcome: Progressing     Problem: Safety - Adult  Goal: Free from fall injury  Outcome: Progressing     Problem: ABCDS Injury Assessment  Goal: Absence of physical injury  Outcome: Progressing     Problem: Pain  Goal: Verbalizes/displays adequate comfort level or baseline comfort level  Outcome: Progressing     Problem: Physical Therapy - Adult  Goal: By Discharge: Performs mobility at highest level of function for planned discharge setting. See evaluation for individualized goals.   7/30/2022 1154 by Karlos Washington PT  Outcome: Progressing

## 2022-07-31 NOTE — PROGRESS NOTES
Physical Therapy Rehab Treatment Note  Facility/Department: [facility]  Room: [place] [id number]       NAME: Dayan Domínguez  : 1950 (25 y.o.)  MRN: [id number]  CODE STATUS: Full Code    Date of Service: 2022  Additional Pertinent Hx: revision TATE 22  Family / Caregiver Present: No  Referring Practitioner: Dr. Stefani Nair DO  Referral Date : 22  Diagnosis: Impaired mobility and ADLs d/t R hip revision total hip arthroplasty-rehab admit 22    Restrictions: post hip precautions       SUBJECTIVE:   Subjective: Pt reports post op hip pain 5/10 \"achy\" pt able to recall all 3 hip precautions. Pain   5/10 Rt hip    OBJECTIVE:   Bed mobility  Supine to Sit: Stand by assistance  Sit to Supine: Stand by assistance      Ambulation  WB Status: R LE WBAT; posterior hip precautions  Ambulation  Surface: carpet;level tile  Device: Rolling Walker  Assistance: Stand by assistance  Quality of Gait: antalgic gait, VCs to decreased twisting with change of direction  Gait Deviations: Slow Francoise;Decreased step length  Distance: 150ft x2  More Ambulation?: Yes  Ambulation 2  Surface - 2: level tile;carpet  Device 2: Single point cane  Assistance 2: Stand by assistance  Quality of Gait 2: Step through patter w/ good HS, reduce Rt step length likely D/T limited Rt hip extension mobility, no LOB or unsteadiness observed  Distance: 75'x2    Stairs  # Steps : 12  Stairs Height: 6\"  Device: No Device  Assistance: Stand by assistance  Comment: Vc's and demo for proper sequencing. Pt able to ascend/descend recip w/ use of B HR's, NR w/ x1 HR. PT Exercises  Standing Open/Closed Kinetic Chain Exercises: sink ex x10 ea       ASSESSMENT/PROGRESS TOWARDS GOALS:   Body Structures, Functions, Activity Limitations Requiring Skilled Therapeutic Intervention: Decreased functional mobility ; Decreased strength;Decreased balance;Decreased endurance  Assessment: Trial of amb w/ SC to twork towards pts goal of walking w/ LRD

## 2022-07-31 NOTE — PROGRESS NOTES
Physical Therapy  Facility/Department: [facility] P885/[id number]  Physical Therapy Discharge      NAME: Ayush Shaw    : 1950 (60 y.o.)  MRN: [id number]    Account: [de-identified]  Gender: female      Patient has been discharged from acute care hospital. DC patient from current PT program.      Electronically signed by Sara Lama PT on 22 at 1:07 PM EDT

## 2022-07-31 NOTE — PROGRESS NOTES
Therapy:   Bed mobility:  Bed mobility  Rolling to Left: Stand by assistance (07/30/22 1138)  Rolling to Right: Stand by assistance (07/30/22 1138)  Supine to Sit: Stand by assistance (07/31/22 1146)  Sit to Supine: Stand by assistance (07/31/22 1146)  Scooting: Stand by assistance (07/30/22 1138)  Bed Mobility Comments: pillow between legs to maintain precautions (07/30/22 1138)  Transfers:  Transfers  Sit to Stand: Stand by assistance (07/30/22 1410)  Stand to sit: Stand by assistance (07/30/22 1410)  Bed to Chair: Stand by assistance (07/30/22 1138)  Stand Pivot Transfers: Stand by assistance (with 2ww) (07/30/22 1138)  Car Transfer: Stand by assistance (VCs for technique and maintaining hip prec.) (07/30/22 1155)  Comment: vc's for maintaining hip precautions (07/30/22 1138)  Gait:   Ambulation  WB Status: R LE WBAT; posterior hip precautions (07/31/22 1126)  Ambulation  Surface: carpet;level tile (07/31/22 1126)  Device: Rolling Walker (07/31/22 1126)  Assistance: Stand by assistance (07/31/22 1126)  Quality of Gait: antalgic gait, VCs to decreased twisting with change of direction (07/31/22 1126)  Gait Deviations: Slow Francoise;Decreased step length (07/31/22 1126)  Distance: 150ft x2 (07/31/22 1126)  Comments: TUG 52sec, 36 seconds Avg=44 seconds=high fall risk (07/30/22 1139)  More Ambulation?: Yes (07/31/22 1126)  Ambulation 2  Surface - 2: level tile;carpet (07/31/22 1126)  Device 2: Single point cane (07/31/22 1126)  Assistance 2: Stand by assistance (07/31/22 1126)  Quality of Gait 2: Step through patter w/ good HS, reduce Rt step length likely D/T limited Rt hip extension mobility, no LOB or unsteadiness observed (07/31/22 1126)  Distance: 75'x2 (07/31/22 1126)  Stairs:  Stairs/Curb  Stairs?: No (limited by time contraints) (07/30/22 1139)  Stairs  # Steps : 12 (07/31/22 1126)  Stairs Height: 6\" (07/31/22 1126)  Rails: Bilateral (07/30/22 1148)  Device: No Device (07/31/22 1126)  Assistance: Stand by assistance (07/31/22 1126)  Comment: Vc's and demo for proper sequencing. Pt able to ascend/descend recip w/ use of B HR's, NR w/ x1 HR. (07/31/22 1126)  W/C mobility:       Occupational Therapy:   Hand Dominance: Right  ADL  Feeding: Dentures; Modified independent  (upper dentures only) (07/30/22 0806)  Grooming: Stand by assistance (07/30/22 0806)  Grooming Skilled Clinical Factors: VC's to follow precautions (07/30/22 0806)  UE Bathing: Supervision (07/30/22 0806)  LE Bathing: Minimal assistance (07/30/22 0806)  LE Bathing Skilled Clinical Factors: pt raising L leg to wash feet, assist with R foot, VC's to follow precautions (pt trying to flex leg while seated to reach foot), (07/30/22 0806)  UE Dressing: Setup;Supervision (07/30/22 0806)  LE Dressing: Moderate assistance (07/30/22 0806)  LE Dressing Skilled Clinical Factors: to don socks B feet, pt able to doff L sock,assist to thread LE into LB clothing (07/30/22 0806)  Toileting: Unable to assess(comment) (07/30/22 0806)  Toileting Skilled Clinical Factors: pt declined need (07/30/22 0806)  Toilet Transfers  Toilet Transfer: Unable to assess (07/30/22 1219)     Shower Transfers  Shower - Transfer From: [facility] (07/30/22 1219)  Shower - Transfer Type: To (07/30/22 1219)  Shower - Transfer To: Shower seat with back (07/30/22 1219)  Shower - Technique: Ambulating (07/30/22 1219)  Shower Transfers: Stand by assistance (07/30/22 1219)  Shower Transfers Comments: VC's to follow precautions (07/30/22 1219)    Speech Therapy:            Diet/Swallow:                      Lab/X-ray studies reviewed, analyzed and discussed with patient and staff:   No results found for this or any previous visit (from the past 24 hour(s)). XR HIP RIGHT (1 VIEW)    Result Date: 7/28/2022  PROCEDURE: X-ray right hip INDICATION: Postoperative evaluation. COMPARISON: 7/20/2022 FINDINGS: Unremarkable alignment of the right hip prosthesis, no evidence of lucency surrounding the prosthesis. No evidence of cortical irregularity or lucency to suggest a fracture, no evidence of lytic or sclerotic bone lesion is seen. Mild degenerative bone changes are seen. The overlying soft tissues are unremarkable. Unremarkable alignment of the right hip prosthesis, no evidence of complications. XR HIP RIGHT (1 VIEW)    Result Date: 7/20/2022  EXAMINATION:  X-RAY RIGHT HIP. CLINICAL HISTORY:  POSTREDUCTION. COMPARISONS:  Radiograph obtained earlier the same date. TECHNIQUE:  Portable frontal view right hip. FINDINGS:  Single frontal view of the right hip demonstrates reduction of the previously noted dislocation of the right hip prosthesis. No other significant new nor acute osseous lesion has developed. REDUCTION OF PREVIOUSLY NOTED DISLOCATION OF RIGHT HIP PROSTHESIS. XR HIP RIGHT (2-3 VIEWS)    Result Date: 7/20/2022  EXAMINATION:  X-RAY RIGHT HIP. CLINICAL HISTORY:  RIGHT HIP DISLOCATION COMPARISONS:  9/9/2006. TECHNIQUE:  Frontal view pelvis. Frontal and crosstable lateral views right hip. FINDINGS:  There is a right hip prosthesis. There is superolateral dislocation of the femoral head component relative to the acetabular cup. Remaining osseous structures are otherwise grossly intact. There is no other fracture no destructive osseous lesion. Pelvic calcifications may represent phleboliths. RIGHT HIP ARTHROPLASTY. SUPERIOR DISLOCATION OF THE FEMORAL HEAD COMPONENT RELATIVE TO THE ACETABULAR CUP. Previous extensive, complex labs, notes and diagnostics reviewed and analyzed.      ALLERGIES:    Allergies as of 07/29/2022 - Fully Reviewed 07/29/2022   Allergen Reaction Noted    Doxycycline  02/25/2021    Ibandronic acid  07/27/2022    Keflex [cephalexin] Nausea Only 07/18/2017    Lidocaine  02/25/2021    Nickel  08/21/2014    Prednisone  07/21/2020    Shellfish-derived products  02/25/2021    Simvastatin  04/18/2017    Tetracyclines & related  03/09/2021    Ultram [tramadol hcl]  09/20/2011 Augmentin [amoxicillin-pot clavulanate] Nausea And Vomiting 02/25/2021      (please also verify by checking [facility])       Complex Physical Medicine & Rehab Issues Assess & Plan:   Severe abnormality of gait and mobility and impaired self-care and ADL's secondary to progressive R hip revision total hip arthroplasty . Functional and medical status reassessed regarding patients ability to participate in therapies and patient found to be able to participate in acute intensive comprehensive inpatient rehabilitation program including PT/OT to improve balance, ambulation, ADLs, and to improve the P/AROM. Therapeutic modifications regarding activities in therapies, place, amount of time per day and intensity of therapy made daily. In bed therapies or bedside therapies prn. Bowel and Bladder dysfunction  , Neurogenic bowel and bladder:  frequent toileting, ambulate to bathroom with assistance, check post void residuals. Check for C.difficile x1 if >2 loose stools in 24 hours, continue bowel & bladder program.  Monitor bowel and bladder function. Lactinex 2 PO every AC. MOM prn, Brown Bomb prn, Glycerin suppository prn, enema prn. Encourage therapy and nursing to co-treat and problem solve re continence. Moderate   pain as well as generalized OA pain: reassess pain every shift and prior to and after each therapy session, give prn Tylenol and consider scheduled Tylenol, modalities prn in therapy, masage, Lidoderm, K-pad prn. Consider scheduled AM pain meds. Skin healing   and breakdown risk:  continue pressure relief program.  Daily skin exams and reports from nursing. Fatigue due to nutritional and hydration deficiency: Add and titrate vitamin B12 vitamin D and CoQ10 continue to monitor I&Os, calorie counts prn, dietary consult prn. Add healthy snack at night. Acute episodic insomnia with situational adjustment disorder:  prn Ambien, monitor for day time sedation.   Falls risk elevated:  patient to use call light to get nursing assistance to get up, bed and chair alarm. Elevated DVT risk: progressive activities in PT, continue prophylaxis LUIS hose, elevation and  see mar . Complex discharge planning:  Weekly team meeting every Monday to re-assess progress towards goals, discuss and address social, psychological and medical comorbidities and to address difficulties they may be having progressing in therapy. Patient and family education is in progress. The patient is to follow-up with their family physician after discharge.         Complex Active General Medical Issues that complicate care Assess & Plan:    Patient Active Problem List   Diagnosis    Osteoarthritis    Headache    Hernia, hiatal    Anxiety    Family history of heart disease    Hx of cardiac cath x 2, normal, last 9/13    Fatigue    Allergic rhinitis, Dr Mague Anderson    Dyslipidemia    Cat bite involving extremity    Cellulitis, improving    CAD (coronary artery disease)    S/P cervical spinal fusion, 5-6    CTS (carpal tunnel syndrome), Ortho    Cervical radiculitis, surgery pending    History of tobacco use    PADMINI (generalized anxiety disorder)    DDD (degenerative disc disease), lumbar    Cervical spine disease    COPD (chronic obstructive pulmonary disease) (HCC)    Migraine    Myalgia    Osteoarthritis of left knee    Other symptoms referable to back    Postlaminectomy syndrome, cervical region    Right leg numbness    Status post total replacement of right hip    Impaired mobility and activities of daily living dt RIGHT HIP REVISION TOTAL HIP ARTHROPLASTY    Chronic mesenteric ischemia (HCC)    Cervical spondylosis without myelopathy    Bilateral carotid artery stenosis    Acquired hypothyroidism    Depressive disorder    Hyperglycemia    Lumbar spondylosis    Impaired mobility and ADLs             Focus of today's plan-  Initiate and modify therapuetic plan to meet patients individual needs, add rest breaks as needed, and Mishel Gregorio MD Patricia Ocampo D.O., PM&R     Attending    [address]

## 2022-08-01 PROCEDURE — 1180000000 HC REHAB R&B

## 2022-08-01 PROCEDURE — 6370000000 HC RX 637 (ALT 250 FOR IP): Performed by: PHYSICAL MEDICINE & REHABILITATION

## 2022-08-01 PROCEDURE — 6370000000 HC RX 637 (ALT 250 FOR IP): Performed by: NURSE PRACTITIONER

## 2022-08-01 PROCEDURE — 97110 THERAPEUTIC EXERCISES: CPT

## 2022-08-01 PROCEDURE — 6370000000 HC RX 637 (ALT 250 FOR IP): Performed by: CLINICAL NURSE SPECIALIST

## 2022-08-01 PROCEDURE — 97535 SELF CARE MNGMENT TRAINING: CPT

## 2022-08-01 PROCEDURE — 99233 SBSQ HOSP IP/OBS HIGH 50: CPT | Performed by: PHYSICAL MEDICINE & REHABILITATION

## 2022-08-01 PROCEDURE — 97116 GAIT TRAINING THERAPY: CPT

## 2022-08-01 RX ADMIN — TRAMADOL HYDROCHLORIDE 50 MG: 50 TABLET, COATED ORAL at 21:27

## 2022-08-01 RX ADMIN — GABAPENTIN 300 MG: 300 CAPSULE ORAL at 21:27

## 2022-08-01 RX ADMIN — TRAMADOL HYDROCHLORIDE 50 MG: 50 TABLET, COATED ORAL at 00:49

## 2022-08-01 RX ADMIN — GABAPENTIN 300 MG: 300 CAPSULE ORAL at 08:40

## 2022-08-01 RX ADMIN — LORAZEPAM 1 MG: 1 TABLET ORAL at 21:27

## 2022-08-01 RX ADMIN — Medication 2000 UNITS: at 17:29

## 2022-08-01 RX ADMIN — TRAMADOL HYDROCHLORIDE 50 MG: 50 TABLET, COATED ORAL at 17:30

## 2022-08-01 RX ADMIN — ROSUVASTATIN CALCIUM 10 MG: 10 TABLET, FILM COATED ORAL at 21:27

## 2022-08-01 RX ADMIN — TRAMADOL HYDROCHLORIDE 50 MG: 50 TABLET, COATED ORAL at 06:41

## 2022-08-01 RX ADMIN — TRAMADOL HYDROCHLORIDE 50 MG: 50 TABLET, COATED ORAL at 12:07

## 2022-08-01 RX ADMIN — Medication 100 MG: at 08:42

## 2022-08-01 RX ADMIN — ACETAMINOPHEN 650 MG: 325 TABLET ORAL at 17:29

## 2022-08-01 RX ADMIN — ACETAMINOPHEN 650 MG: 325 TABLET ORAL at 06:41

## 2022-08-01 RX ADMIN — ACETAMINOPHEN 650 MG: 325 TABLET ORAL at 12:06

## 2022-08-01 RX ADMIN — ASPIRIN 81 MG: 81 TABLET, COATED ORAL at 08:41

## 2022-08-01 RX ADMIN — ASPIRIN 81 MG: 81 TABLET, COATED ORAL at 21:27

## 2022-08-01 ASSESSMENT — PAIN DESCRIPTION - ORIENTATION
ORIENTATION: RIGHT

## 2022-08-01 ASSESSMENT — PAIN DESCRIPTION - LOCATION
LOCATION: HIP

## 2022-08-01 ASSESSMENT — PAIN DESCRIPTION - DESCRIPTORS
DESCRIPTORS: ACHING;SORE;TIGHTNESS
DESCRIPTORS: ACHING;SORE
DESCRIPTORS: SORE;TIGHTNESS
DESCRIPTORS: ACHING
DESCRIPTORS: ACHING

## 2022-08-01 ASSESSMENT — PAIN SCALES - GENERAL
PAINLEVEL_OUTOF10: 3
PAINLEVEL_OUTOF10: 5
PAINLEVEL_OUTOF10: 3
PAINLEVEL_OUTOF10: 4
PAINLEVEL_OUTOF10: 5
PAINLEVEL_OUTOF10: 0
PAINLEVEL_OUTOF10: 6
PAINLEVEL_OUTOF10: 3

## 2022-08-01 ASSESSMENT — PAIN - FUNCTIONAL ASSESSMENT
PAIN_FUNCTIONAL_ASSESSMENT: ACTIVITIES ARE NOT PREVENTED

## 2022-08-01 NOTE — PROGRESS NOTES
possible.       PHYSICAL THERAPY  Bed mobility:  Bed mobility  Rolling to Left: Modified independent (Instructed pt to place pillow between knees before rolling.) (08/01/22 1045)  Rolling to Right: Unable to assess (d/t recent hip surgery) (08/01/22 1045)  Supine to Sit: Modified independent (08/01/22 1045)  Sit to Supine: Modified independent (08/01/22 1045)  Scooting: Stand by assistance (07/30/22 1138)  Bed Mobility Comments: pillow between legs to maintain precautions (07/30/22 1138)  Transfers:  Transfers  Sit to Stand: Modified independent (08/01/22 1033)  Stand to sit: Modified independent (08/01/22 1033)  Bed to Chair: Modified independent (08/01/22 1033)  Stand Pivot Transfers: Stand by assistance (with 2ww) (07/30/22 1138)  Car Transfer: Supervision (VCs for technique.) (08/01/22 1033)  Comment: vc's for maintaining hip precautions (07/30/22 1138)  Gait:   Ambulation  WB Status: R LE WBAT; posterior hip precautions (08/01/22 1156)  Ambulation  Surface: carpet;level tile (08/01/22 1156)  Device: Rolling Walker (08/01/22 1156)  Assistance: Supervision (08/01/22 1156)  Quality of Gait: antalgic gait, VCs to decreased twisting with change of direction (08/01/22 1156)  Gait Deviations: Slow Francoise;Decreased step length (07/31/22 1126)  Distance: 350ft (08/01/22 1156)  Comments: TUG 52sec, 36 seconds Avg=44 seconds=high fall risk (07/30/22 1139)  More Ambulation?: Yes (07/31/22 1126)  Ambulation 2  Surface - 2: level tile;carpet (07/31/22 1126)  Device 2: Single point cane (07/31/22 1126)  Assistance 2: Stand by assistance (07/31/22 1126)  Quality of Gait 2: Step through patter w/ good HS, reduce Rt step length likely D/T limited Rt hip extension mobility, no LOB or unsteadiness observed (07/31/22 1126)  Distance: 75'x2 (07/31/22 1126)  Stairs:  Stairs/Curb  Stairs?: Yes (08/01/22 1035)  Stairs  # Steps : 12 (08/01/22 1035)  Stairs Height: 6\" (08/01/22 1035)  Rails: Left ascending (08/01/22 1156)  Device: No Device (07/31/22 1126)  Assistance: Stand by assistance (07/31/22 1126)  Comment: Completed with 1 rail ascending on L to simulate home enterance and ascending R rail and st cane in L to simulate stairs 2nd floor bed and bath. Instructed pt on sequencing with st cane. (08/01/22 1156)  W/C mobility:           Pt and Family training goals-  Focus on sequencing and endurance        OCCUPATIONAL THERAPY  Grooming/Oral Hygiene  Assistance Level: Modified independent (08/01/22 1134)  Upper Extremity Bathing  Assistance Level: Independent (08/01/22 1134)  Lower Extremity Bathing  Assistance Level: Modified independent (08/01/22 1134)  Upper Extremity Dressing  Assistance Level: Independent (08/01/22 1134)  Lower Extremity Dressing  Equipment Provided: Reachers (08/01/22 1134)  Assistance Level: Modified independent (08/01/22 1134)  Putting On/Taking Off Footwear  Equipment Provided: Sock aid (08/01/22 1134)  Assistance Level: Modified independent (08/01/22 1134)  Skilled Clinical Factors: Pt demonstrated good use of sock aid to don non-skid socks. (08/01/22 1134)  Toileting  Skilled Clinical Factors: Unable to formally assess; pt did not void (08/01/22 1134)  Toilet Transfers  Skilled Clinical Factors: Pt did not void (08/01/22 1134)  Tub/Shower Transfers  Type: Shower (08/01/22 1134)  Transfer From: Rolling walker (08/01/22 1134)  Transfer To: Shower chair with back (08/01/22 1134)  Assistance Level: Supervision (08/01/22 1134)      Plans for addressing ADL deficits affecting: Focus on sequencing.         Bladder function disrupting functional progress- no problems      Plans to address- coordinate scheduled voids before bed and therapy with nursing     Skin deficits- complex wound dressing changes affecting ADLs   Plans to address- coordinate patient dressing changes education with nursing as part of ADL training                SPEECH THERAPY/SLP             Plans for cognitive and communication issues affecting addressing:   Pt and Family training goals-  teach patient and family home medication administration charting      Diet/Swallow:                           COGNITION  OT: Cognition Comment: Comp: Independent; Express: Independent;  Soc Inter: Independent; Mem: Mod Independent; Prob Solv: Sup  SP:        Social History     Socioeconomic History    Marital status: Single     Spouse name: Not on file    Number of children: 3    Years of education: Not on file    Highest education level: Not on file   Occupational History    Occupation: Homemaker   Tobacco Use    Smoking status: Former     Packs/day: 0.50     Years: 20.00     Pack years: 10.00     Types: Cigarettes     Quit date: 2013     Years since quittin.9    Smokeless tobacco: Never   Vaping Use    Vaping Use: Every day   Substance and Sexual Activity    Alcohol use: No    Drug use: Never    Sexual activity: Not on file   Other Topics Concern    Not on file   Social History Narrative    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    Lives With: Alone    Type of Home: House in [address]: Two level, Able to Live on Main level with bedroom/bathroom, Bed/Bath upstairs (has BSC and couch on first level if needed)    Home Access: Stairs to enter with rails    Entrance Stairs - Number of Steps: 3 with one rail: 12 to second level - +4 more    Bathroom Shower/Tub: Tub/Shower unit    Bathroom Equipment: Grab bars in shower, Hand-held shower    Home Equipment: Yash Mould, rolling, Cane, Long-handled shoehorn    Has the patient had two or more falls in the past year or any fall with injury in the past year?:  (Has dislocated hip x2)    ADL Assistance: Independent    Homemaking Assistance: Independent    Ambulation Assistance: Independent (with ww)    Transfer Assistance: Independent    Active : Yes (Son has been assisting and friend can assist)    Additional Comments: has 3 children - one does not work - pt states she has a friend that can stay with her initially and she will stay on first level if d/c is to home                  Social Determinants of Health     Financial Resource Strain: Not on file   Food Insecurity: Not on file   Transportation Needs: Not on file   Physical Activity: Not on file   Stress: Not on file   Social Connections: Not on file   Intimate Partner Violence: Not on file   Housing Stability: Not on file           THERAPY, MEDICAL AND NURSING COORDINATION:    [x]  Pain medication before therapies     [x]  Check orthostatic BP and monitor heart rate and medications effects with therapy      [x]  Ambulate to the bathroom in room    [x]  Add scheduled rest beaks     [x]  In room therapies as needed      Discharge date set for:              8/4/22      Home with:   alone  with help from   adult children and friends            And:      Home Health Care:     [x]  PT    []  OT    []  ST   [x]  Aide   []  SW    [x]  RN               Or preferably     Outpatient Therapy:  [x]  PT    []  OT    []  ST   []  Rehab Psych                 Equipment:  [employer]      At D/C their function is goaled at:   PT:Long term goal 1: Pt will demonstrate bed mobiilty with indep following posterior hip precautions  Long term goal 2: Pt will demonstrate functional transfers including car transfers with indep  Long term goal 3: Pt will amb 150ft with LRAD and indep  Long term goal 4: Pt will negotiate 12 steps with handrail and indep  Long term goal 5: Pt will demonstrate TUG <18 seconds to decrease fall risk  OT:Eating  Assistance Needed: Independent  CARE Score: 6  Discharge Goal: Independent, Oral Hygiene  Assistance Needed: Supervision or touching assistance  CARE Score: 4  Discharge Goal: Independent, Toileting Hygiene  Comment: pt declined  Reason if not Attempted: Not applicable  CARE Score: 9  Discharge Goal: Independent, Shower/Bathe Self  Assistance Needed: Partial/moderate assistance  Comment: assist with RLE  CARE Score: 3  Discharge Goal: Independent  Upper Body Dressing  Assistance Needed: Setup or clean-up assistance  CARE Score: 5  Discharge Goal: Independent, Lower Body Dressing  Assistance Needed: Partial/moderate assistance  Comment: assist to thread RLE  CARE Score: 3  Discharge Goal: Independent, Putting On/Taking Off Footwear  Assistance Needed: Partial/moderate assistance  Comment: assist with RLE  CARE Score: 3  Discharge Goal: Independent, Toilet Transfer  Reason if not Attempted: Not applicable  CARE Score: 9  Discharge Goal: Independent  SP:               From a cognitive standpoint they will need:        24 hr supervision  --progress to occasional             Significant problems/ barriers to functional progress include: Pt is at a high risk for functional loss,        [x]  poor endurance    [x]  Severe pain exacerbation       [x]  Bowel constipation           Plan to correct barriers to functional progress: Add scheduled rest breaks, CoQ 10 and Vit B 12, control pain by using ice Lidoderm rest and massage as well as pain medications prior to therapy. Spread therapy of 15 hours out over a 7 day window to accommodate rest breaks and medical interventions. Patient seems to be making fair to good response to these interventions. Based on a comprehensive evaluation of the above, the individualized therapy and Discharge plan will be:    -Times stated are an average that will be varied based on the patient's daily need. PT    1 1/2  hrs/day 5-7 days per week      OT    1 1/2 hrs per day 5-7 days per week     ST     1/2    hrs /day 3-5 days per week       Estimated LOS  1- 2 week(s)    - Overall functional prognosis:     [x]  Good    []  Fair    []  Poor -Medical Prognosis:   [x]  Good    []  Fair    []  Poor    This patient was made aware of the discussion of Plan of Care, their projected dicharge date and their projected function at discharge.          Irma Robledo DO

## 2022-08-01 NOTE — CARE COORDINATION
LSW updated pt and notified her of projected discharge date of 8/4. Pt was upset and stated that she would like to go on 8/3. LSW notified therapy, nursing, and Dr. Anibal Black. Independent in the room note was started.  Electronically signed by DICK Altman, EDGARW on 8/1/2022 at 4:11 PM

## 2022-08-01 NOTE — PLAN OF CARE
Problem: Discharge Planning  Goal: Discharge to home or other facility with appropriate resources  8/1/2022 1019 by Kallie Mancilla RN  Outcome: Progressing  Flowsheets (Taken 8/1/2022 0845)  Discharge to home or other facility with appropriate resources:   Identify barriers to discharge with patient and caregiver   Arrange for needed discharge resources and transportation as appropriate   Identify discharge learning needs (meds, wound care, etc)   Refer to discharge planning if patient needs post-hospital services based on physician order or complex needs related to functional status, cognitive ability or social support system  8/1/2022 0009 by Aiyana Bailey. Tashi Morley RN  Outcome: Progressing     Problem: Safety - Adult  Goal: Free from fall injury  8/1/2022 1019 by Kallie Mancilla RN  Outcome: Progressing  8/1/2022 0009 by Aiyana Bailey. Tashi Morley RN  Outcome: Progressing     Problem: ABCDS Injury Assessment  Goal: Absence of physical injury  8/1/2022 1019 by Kallie Mancilla RN  Outcome: Progressing  8/1/2022 0009 by Aiyana Bailey. Tashi Morley RN  Outcome: Progressing     Problem: Pain  Goal: Verbalizes/displays adequate comfort level or baseline comfort level  8/1/2022 1019 by Kallie Mancilla RN  Outcome: Progressing  8/1/2022 0009 by Aiyana Bailey.  Tashi Morley RN  Outcome: Progressing

## 2022-08-01 NOTE — PROGRESS NOTES
Physical Therapy Rehab Treatment Note  [facility]ility[facility]Department[facility]  [facility]       NAME: Dayan Domínguez  : 1950 (80 y.o.)  MRN: [id number]  CODE STATUS: Full Code    Date of Service: 2022       Restrictions:  Restrictions/Precautions: Weight Bearing, Fall Risk  Lower Extremity Weight Bearing Restrictions  Right Lower Extremity Weight Bearing: Weight Bearing As Tolerated  Left Lower Extremity Weight Bearing: Weight Bearing As Tolerated  Position Activity Restriction  Hip Precautions: Posterior hip precautions       SUBJECTIVE:   Subjective: Pt reports she would like to home tomorrow. Pain  Pain: 3/10 R hip sore Pt declined intervention. OBJECTIVE:         Transfers  Sit to Stand: Modified independent  Stand to sit: Modified independent  Bed to Chair: Modified independent      Ambulation  WB Status: R LE WBAT; posterior hip precautions  Ambulation  Surface: carpet;level tile;ramp  Device: Rolling Walker  Assistance: Supervision  Quality of Gait: antalgic gait, VCs to decreased twisting with change of direction  Distance: 250ft    Stairs/Curb  Stairs?: Yes  Stairs  # Steps : 12  Stairs Height: 6\"  Rails: Left ascending  Device: Single pt cane  Assistance: Supervision  Comment: Completed with 1 rail ascending on L to simulate home enterance and ascending R rail and st cane in L to simulate stairs 2nd floor bed and bath. Instructed pt on sequencing with st cane. PT Exercises  Exercise Treatment: seated LAQs x20, hip add with ball x20, hip abd RTB x20, hamstring curls RTB x20  Standing Open/Closed Kinetic Chain Exercises: sink ex x10 ea            ASSESSMENT/PROGRESS TOWARDS GOALS:   Assessment: Pt is nearing goals. Requires occasional cues to follow hip prec.     Goals:  Long Term Goals  Long term goal 1: Pt will demonstrate bed mobiilty with indep following posterior hip precautions  Long term goal 2: Pt will demonstrate functional transfers including car transfers with indep  Long term goal 3: Pt will amb 150ft with LRAD and indep  Long term goal 4: Pt will negotiate 12 steps with handrail and indep  Long term goal 5: Pt will demonstrate TUG <18 seconds to decrease fall risk    PLAN OF CARE/Safety:   Plan Comment: Cont per POC.       Therapy Time:   Individual   Time In 1430   Time Out 1530   Minutes 60     Minutes:  Transfer/Bed mobility trainin  Gait trainin  Neuro re education:0  Therapeutic ex:30      Miles JANINE Goins, 22 at 3:36 PM

## 2022-08-01 NOTE — PROGRESS NOTES
Physical Therapy Rehab Treatment Note  [facility]  [facility]       NAME: Sivakumar Alva  : 1950 (80 y.o.)  MRN: [id number]  CODE STATUS: Full Code    Date of Service: 2022       Restrictions:  Restrictions/Precautions: Weight Bearing, Fall Risk  Lower Extremity Weight Bearing Restrictions  Right Lower Extremity Weight Bearing: Weight Bearing As Tolerated  Left Lower Extremity Weight Bearing: Weight Bearing As Tolerated  Position Activity Restriction  Hip Precautions: Posterior hip precautions       SUBJECTIVE:   Subjective: Pt reports she is off IV pain meds and is trying other meds. PT reports she is having trouble getting her R sock on and off. Pt reports she has a reacher and shoe horn but does not have a sock aide. Pain  Pain: 3/10 R hip sore Pt declined intervention. OBJECTIVE:      Bed mobility  Rolling to Left: Modified independent (Instructed pt to place pillow between knees before rolling.)  Rolling to Right: Unable to assess (d/t recent hip surgery)  Supine to Sit: Modified independent  Sit to Supine: Modified independent    Transfers  Sit to Stand: Modified independent  Stand to sit: Modified independent  Bed to Chair: Modified independent  Car Transfer: Supervision (VCs for technique.)    Ambulation  WB Status: R LE WBAT; posterior hip precautions  Ambulation  Surface: carpet;level tile  Device: Rolling Walker  Assistance: Supervision  Quality of Gait: antalgic gait, VCs to decreased twisting with change of direction  Distance: 350ft    Stairs/Curb  Stairs?: Yes  Stairs  # Steps : 12  Stairs Height: 6\"  Rails: Left ascending  Comment: Completed with 1 rail ascending on L to simulate home enterance and ascending R rail and st cane in L to simulate stairs 2nd floor bed and bath. Instructed pt on sequencing with st cane.         PT Exercises  Exercise Treatment: supine bridging x10; SAQs x20, heel slides x20, hip abd x20            ASSESSMENT/PROGRESS

## 2022-08-01 NOTE — PROGRESS NOTES
OCCUPATIONAL THERAPY  INPATIENT REHAB TREATMENT NOTE  [facility] - [facility]      NAME: Savi Freedman  : 1950 (80 y.o.)  MRN: [id number]  CODE STATUS: Full Code  Room: R255/R255-01    Date of Service: 2022    Referring Physician: Dr. Jazmyn Zepeda Diagnosis: Impaired mobility and ADL's due to [address]    Restrictions  Restrictions/Precautions  Restrictions/Precautions: Weight Bearing, Fall Risk   Lower Extremity Weight Bearing Restrictions  Right Lower Extremity Weight Bearing: Weight Bearing As Tolerated  Left Lower Extremity Weight Bearing: Weight Bearing As Tolerated     Position Activity Restriction  Hip Precautions: Posterior hip precautions    Patient's date of birth confirmed: Yes    SAFETY:  Safety Devices  Safety Devices in place: Yes  Type of devices: Bed alarm in place; All fall risk precautions in place;Call light within reach    SUBJECTIVE:  Subjective: \"I thought I would get my shower before therapy. \"    Pain at start of treatment: Yes: 2/10    Pain at end of treatment: Yes: 2/10    Location: R hip  Nursing notified: Declined  RN: Rosa Rhodes  Intervention: Repositioned    COGNITION:  Cognition  Cognition Comment: Comp: Independent; Express: Independent; Soc Inter: Independent; Mem: Mod Independent; Prob Solv: Sup      Pt's current cognitive status is:  Comprehension: Independent  Expression: Independent  Social Interaction: Independent  Problem Solving: Mod I  Memory: Independent    OBJECTIVE:         Grooming/Oral Hygiene  Assistance Level: Modified independent  Upper Extremity Bathing  Assistance Level: Independent  Lower Extremity Bathing  Assistance Level: Modified independent  Upper Extremity Dressing  Assistance Level:  Independent  Lower Extremity Dressing  Equipment Provided: Reachers  Assistance Level: Modified independent  Putting On/Taking Off Footwear  Equipment Provided: Sock aid  Assistance Level: Modified independent  Skilled Clinical Factors: Pt demonstrated good use of sock aid to don non-skid socks. Toileting  Skilled Clinical Factors: Unable to formally assess; pt did not void  Toilet Transfers  Skilled Clinical Factors: Pt did not void  Tub/Shower Transfers  Type: Shower  Transfer From: Rolling walker  Transfer To: Shower chair with back  Assistance Level: Supervision         Functional Mobility  Device: Rolling walker  Activity: Retrieve items  Assistance Level: Modified independent  Skilled Clinical Factors: Pt performed clothing retrieval with FWW to improve pt overall standing tolerance, standing balance, functional reaching and activity endurance for ADLs. Pt able to retrieve clothing and drape on front of FWW. Pt able to reach minimally outside MYRA with no LOB. Education:  Education  Education Provided: Equipment  Education Provided Comments: Pt educated on use of sock aid. Pt demonstrated good use of sock aid. Education Method: Demonstration;Verbal;Teach Back  Barriers to Learning: None  Education Outcome: Verbalized understanding;Demonstrated understanding    Equipment recommendations:         ASSESSMENT:  Assessment: Pt demonstrated good use of AE for LB dressing. Pt demonstrates good safety awareness during ADL. Pt continues to benefit from OT services to maximize pt independence and safety with ADLs and transfers. Activity Tolerance: Patient tolerated treatment well      PLAN OF CARE:  Strengthening, Balance training, Functional mobility training, Endurance training, Safety education & training, Self-Care / ADL  Continue OT per POC    Patient goals : \"To get out of here. \"  Long Term Goal 1: Pt will be IND wtih precautions during ADL's. Long Term Goal 2: Pt will be Mod IND with LB dressing.         Therapy Time:   Individual Group Co-Treat   Time In 1100       Time Out 1203         Minutes 63                   ADL/IADL trainin minutes     Electronically signed by:    Kadie Hatfield Kristin Sanabria, OTR/L,   8/1/2022, 1:12 PM

## 2022-08-01 NOTE — CARE COORDINATION
[address] [facility]  INPATIENT REHABILITATION  TEAM CONFERENCE NOTE  Room: R255/R255-01  Admit Date: 2022       Date: 2022  Patient Name: Doretha Sood        MRN: [id number]    : 1950  (80 y.o.)  Gender: female   Referring Practitioner: Dr. Graciela Tsai, DO    REHAB DIAGNOSIS:   Diagnosis: Impaired mobility and ADLs d/t R hip revision total hip arthroplasty-rehab admit 22    CO MORBIDITIES:      Past Medical History:   Diagnosis Date    Acquired hypothyroidism 2022    Allergic rhinitis, Dr Clint Gardiner 10/28/2013    Anxiety     Back pain     Bilateral carotid artery stenosis 2022    CAD (coronary artery disease)     Cat bite involving extremity 2013    Cat bite involving extremity 2013    Cellulitis 2013    Cellulitis, improving 2013    Cervical radiculitis 2014    Cervical radiculitis, surgery pending 2014    COPD (chronic obstructive pulmonary disease) ([facility] [address] 1/3/2017    CTS (carpal tunnel syndrome), Ortho 2014    Depressive disorder 3/25/2022    Dyslipidemia 10/28/2013    Fatigue 10/7/2013    Headache(784.0)     H/O MIGRANES    Hernia, hiatal     H/O     Hx of cardiac cath x 2, normal, last 9/13 10/7/2013    Osteoarthritis     S/P cervical spinal fusion, 5-6 2014    Seizure disorder ([address]     Seizure disorder ([address]     Sinusitis 10/28/2013    Tobacco abuse, quit 9/3/13 10/7/2013    Tobacco abuse, quit 9/3/13      Past Surgical History:   Procedure Laterality Date    APPENDECTOMY      CARDIAC CATHETERIZATION  2013     [address]    fusion    CHOLECYSTECTOMY      COLONOSCOPY  2009    COSMETIC SURGERY  2010    on eye    JOINT REPLACEMENT Right 2021    hip    VA TOTAL KNEE ARTHROPLASTY Left 2017    LEFT TOTAL KNEE ARTHROPLASTY SUPINE BELEN PSI SPINAL (OUTSIDE PAT DR. OLIVA) performed by Kerry Fernandez MD at [address] [place] [address] Right 2022    RIGHT HIP REVISION TOTAL HIP ARTHROPLASTY performed by Dg Khan MD at [address] [facility]        Restrictions  Restrictions/Precautions: Weight Bearing, Fall Risk  Lower Extremity Weight Bearing Restrictions  Right Lower Extremity Weight Bearing: Weight Bearing As Tolerated  Left Lower Extremity Weight Bearing: Weight Bearing As Tolerated  Position Activity Restriction  Hip Precautions: Posterior hip precautions  CASE MANAGEMENT    Social/Functional History  Social/Functional History  Lives With: Alone  Type of Home: House  Home Layout: Two level, Able to Live on Main level with bedroom/bathroom, Bed/Bath upstairs (has BSC and couch on first level if needed)  Home Access: Stairs to enter with rails  Entrance Stairs - Number of Steps: 3 with one rail (L): 1+ 12 + landing + 4 stairs to second level (R HR ascending)  Entrance Stairs - Rails: Left  Bathroom Shower/Tub: Tub/Shower unit (on upper level)  Bathroom Toilet: Standard (adds on BSC to raise it)  Bathroom Equipment: Grab bars in shower, Hand-held shower (cannot put chair in due to grooves on bottom of tub)  Bathroom Accessibility: Walker accessible  Home Equipment: Reacher, Walker, rolling, Cane, Long-handled shoehorn, Rollator  Has the patient had two or more falls in the past year or any fall with injury in the past year?: No  ADL Assistance: Independent  Homemaking Assistance: Independent (hired son to [employer])  Homemaking Responsibilities: Yes  Ambulation Assistance: Independent (w/o device)  Transfer Assistance: Independent  Active : Yes (son and friend can assist)  Mode of Transportation: Car  Education: some college  Occupation: Retired  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 ADULT DIET; Regular    SpO2: 94 % (08/01/22 0647)  O2 Flow Rate (L/min): 0 L/min (07/29/22 1930)    Oxygen to be continued upon discharge: No  Home-going needs (nocturnal Pox, sleep study, RX, equipment): No    Skin Issues: Yes  Home-going needs (education, training, RX, Wound RN): Yes    Pain Managed: Yes    Bladder continence: Yes  Discharging with Odell: No   Training done: No   Urology following: No   Plan/date to remove odell: No   Bladder retraining started: No    Bowel continence:  Yes  Last BM date: 7/31/22  Need for bowel program: No    Anticoagulants: Baby Aspirin 2x/day for 30 days  Home-going needs (Education, labs):  Yes    Antibiotics: none  Stop date: n/a  Home-going needs (education, RX, PICC): No      Other:       PHYSICAL THERAPY  Bed mobility:  Bed mobility  Rolling to Left: Modified independent (Instructed pt to place pillow between knees before rolling.) (08/01/22 1045)  Rolling to Right: Unable to assess (d/t recent hip surgery) (08/01/22 1045)  Supine to Sit: Modified independent (08/01/22 1045)  Sit to Supine: Modified independent (08/01/22 1045)  Scooting: Stand by assistance (07/30/22 1138)  Bed Mobility Comments: pillow between legs to maintain precautions (07/30/22 1138)  Transfers:  Transfers  Sit to Stand: Modified independent (08/01/22 1033)  Stand to sit: Modified independent (08/01/22 1033)  Bed to Chair: Modified independent (08/01/22 1033)  Stand Pivot Transfers: Stand by assistance (with 2ww) (07/30/22 1138)  Car Transfer: Supervision (VCs for technique.) (08/01/22 1033)  Comment: vc's for maintaining hip precautions (07/30/22 1138)  Gait:   Ambulation  WB Status: R LE WBAT; posterior hip precautions (08/01/22 1156)  Ambulation  Surface: carpet;level tile (08/01/22 1156)  Device: Rolling Walker (08/01/22 1156)  Assistance: Supervision (08/01/22 1156)  Quality of Gait: antalgic gait, VCs to decreased twisting with change of direction (08/01/22 1156)  Gait Deviations: Slow Francoise;Decreased step length (07/31/22 1126)  Distance: 350ft (08/01/22 1156)  Comments: TUG 52sec, 36 seconds Avg=44 seconds=high fall risk (07/30/22 1139)  More Ambulation?: Yes (07/31/22 1126)  Ambulation 2  Surface - 2: level tile;carpet (07/31/22 1126)  Device 2: Single point cane (07/31/22 1126)  Assistance 2: Stand by assistance (07/31/22 1126)  Quality of Gait 2: Step through patter w/ good HS, reduce Rt step length likely D/T limited Rt hip extension mobility, no LOB or unsteadiness observed (07/31/22 1126)  Distance: 75'x2 (07/31/22 1126)  Stairs:  Stairs/Curb  Stairs?: Yes (08/01/22 1035)  Stairs  # Steps : 12 (08/01/22 1035)  Stairs Height: 6\" (08/01/22 1035)  Rails: Left ascending (08/01/22 1156)  Device: No Device (07/31/22 1126)  Assistance: Stand by assistance (07/31/22 1126)  Comment: Completed with 1 rail ascending on L to simulate home enterance and ascending R rail and st cane in L to simulate stairs 2nd floor bed and bath. Instructed pt on sequencing with st cane. (08/01/22 1156)  W/C mobility:     LTG:  Long term goal 1: Pt will demonstrate bed mobiilty with indep following posterior hip precautions  Long term goal 2: Pt will demonstrate functional transfers including car transfers with indep  Long term goal 3: Pt will amb 150ft with LRAD and indep  Long term goal 4: Pt will negotiate 16 steps with handrail and indep  Long term goal 5: Pt will demonstrate TUG <18 seconds to decrease fall risk  PT Treatment Time:  2.0 hrs      OCCUPATIONAL THERAPY    EVALUATION SELF CARE STATUS:  Hand Dominance: Right  Feeding: Dentures; Modified independent  (upper dentures only) (07/30/22 3573)  Grooming: Stand by assistance (07/30/22 6923)  Grooming Skilled Clinical Factors: VC's to follow precautions (07/30/22 0806)  UE Bathing: Supervision (07/30/22 0806)  LE Bathing: Minimal assistance (07/30/22 0806)  LE Bathing Skilled Clinical Factors: pt raising L leg to wash feet, assist with R foot, VC's to follow precautions (pt trying to flex leg while seated to reach foot), (07/30/22 0806)  UE Dressing: Setup;Supervision (07/30/22 0806)  LE Dressing: Moderate assistance (07/30/22 0806)  LE Dressing Skilled Clinical Factors: to don socks B feet, pt able to doff L sock,assist to thread LE into LB clothing (07/30/22 0806)  Toileting: Unable to assess(comment) (07/30/22 0806)  Toileting Skilled Clinical Factors: pt declined need (07/30/22 0806)             CURRENT SELF CARE:  Grooming/Oral Hygiene  Assistance Level: Modified independent (08/01/22 1134)  Upper Extremity Bathing  Assistance Level: Independent (08/01/22 1134)  Lower Extremity Bathing  Assistance Level: Modified independent (08/01/22 1134)  Upper Extremity Dressing  Assistance Level: Independent (08/01/22 1134)  Lower Extremity Dressing  Equipment Provided: Reachers (08/01/22 1134)  Assistance Level: Modified independent (08/01/22 1134)  Putting On/Taking Off Footwear  Equipment Provided: Sock aid (08/01/22 1134)  Assistance Level: Modified independent (08/01/22 1134)  Skilled Clinical Factors: Pt demonstrated good use of sock aid to don non-skid socks. (08/01/22 1134)  Toileting  Skilled Clinical Factors: Unable to formally assess; pt did not void (08/01/22 1134)  Toilet Transfers  Skilled Clinical Factors: Pt did not void (08/01/22 1134)  Tub/Shower Transfers  Type: Shower (08/01/22 1134)  Transfer From: Rolling walker (08/01/22 1134)  Transfer To:  Shower chair with back (08/01/22 1134)  Assistance Level: Supervision (08/01/22 1134)        LTG:  Eating  Assistance Needed: Independent  CARE Score: 6  Discharge Goal: Independent, Oral Hygiene  Assistance Needed: Supervision or touching assistance  CARE Score: 4  Discharge Goal: Independent, Toileting Hygiene  Comment: pt declined  Reason if not Attempted: Not applicable  CARE Score: 9  Discharge Goal: Independent, Shower/Bathe Self  Assistance Needed: Partial/moderate assistance  Comment: assist with RLE  CARE Score: 3  Discharge Goal: Independent  Upper Body Dressing  Assistance Needed: Setup or clean-up assistance  CARE Score: 5  Discharge Goal: Independent, Lower Body Dressing  Assistance Needed: Partial/moderate assistance  Comment: assist to thread RLE  CARE Score: 3  Discharge Goal: Independent, Putting On/Taking Off Footwear  Assistance Needed: Partial/moderate assistance  Comment: assist with RLE  CARE Score: 3  Discharge Goal: Independent, Toilet Transfer  Reason if not Attempted: Not applicable  CARE Score: 9  Discharge Goal: Independent  OT Treatment Time: 1.0 hrs      SPEECH THERAPY                 Diet/Swallow:                       LTG:                COGNITION  OT: Cognition Comment: Comp: Independent; Express: Independent; Soc Inter: Independent; Mem: Mod Independent; Prob Solv: Sup  SP:        RECREATIONAL THERAPY  Attendance to recreational therapy programs:    []  Pet Therapy  [] Music Therapy  [] Art Therapy    [] Recreation Therapy Group [] Support Group           Patient social interaction (mood, participation): good, motivated    Patient strengths: independent prior    Patients goal: return home     Problems/Barriers: lives alone--friend will stay temporarily with her        1. Safety:          - Intervention / Plan:    [x]  falls protocol     [x]  PT/OT    []  SP        - Results:         2. Potential DME needs:         - Intervention / Plan:  [x]  PT/OT     [x]  Assess equipment needs/access       - Results:         3. Weakness:          - Intervention / Plan:  [x]  PT/OT      []  Other:         - Results:         4.   Discharge planning needs:          - Intervention / Plan:  [x]  Weekly team conference      [x]  family training        - Results:         5.            - Intervention / Plan:          - Results:         6.            - Intervention / Plan:         - Results:         7.            - Intervention / Plan:         - Results:           Discharge Plan   Estimated Length of Stay: 9 days    Tentative Discharge date: 8/4/22      Anticipated Discharge Destination:  Home      Team recommendations:    1. Follow up Therapy :    PT  OT  RN  [facility]    2. Home Health [facility])    Other:     Equipment needed at Discharge:  Other: TBD      Team Members Present at Conference:    Physician: Dr. Ranjit Ga Worker: DICK Summers, RAY  RN: Alondra Angulo RN  Physical Therapist: Jay Gifford PT  Occupational Therapist: Janeth Schumacher OTR  Speech Therapist: Saundra Wilson SLP    Electronically signed by DICK Summers LSW on 8/1/2022 at 2:35 PM

## 2022-08-01 NOTE — PROGRESS NOTES
Assumed care of pt at [facility] 27. Pt complained of pain to right hip during assessment. Pt stated that she is no longer getting iv tordol and is on scheduled ultram 50 mg now 4 x / day. Pt stated that ultram 50 mg has not taken her pain away since first dose was given at 1701. Pt medicated at  1938 with Roxicodone 5 mg po for break through pain of 7/10 pain to right hip. Pt rechecked half and hour later and pt stated pain was a 2/10 and she felt much better. Hs meds were given at 2109. Pt asked for prn Ativan 1 mg po at hs. Refused ultram 50 mg at hs. Tylenol 650 mg po given at hs prn. Incision intact with aquacel. No drainage. Call light in reach and bed alarm on Electronically signed by Ame Shepherd.  Yadira Garcia on 7/31/2022 at 11:20 PM

## 2022-08-01 NOTE — CARE COORDINATION
[address] [facility]  INPATIENT REHABILITATION  INDEPENDENT IN ROOM NOTE  Room: R255/R255-01  Admit Date: 2022       Date: 2022  Patient Name: Jovan Rosado        MRN: [id number]    : 1950  (80 y.o.)  Gender: female   Referring Practitioner: Dr. Ana Goins DO  Diagnosis: Impaired mobility and ADLs d/t R hip revision total hip arthroplasty-rehab admit 22         Discipline pre admission summary:    Nursing:  Patient orientation to the room/suite:  [x] Yes    []   No  (Safety checks to consider: Oxygen, Fire Alarm, Stove safety, Call alarm, Bed alarm,   Bed power, TV, Phone)        1. Pt physical ability to be independent in room/suite:  [x] Yes    []   No   Comment:    2. Pt demonstrates cognitive ability to be independent in room/suite:  [x] Yes    []   No   Comment:    3. Pt demonstrates emotional ability to be independent in room/suite:  [x] Yes    []   No   Comment:    4. Special Considerations/Equipment if needed: [] NA   Comment:FWW, hip precautions    Recommend independent in room/suite:  [x] Yes    []   No            Signature: Electronically signed by Karl Staley RN on 2022 at 4:18 PM        Occupational Therapy:  1. Pt physical ability to be independent in room/suite:  [x] Yes    []   No   Comment:     2. Pt demonstrates cognitive ability to be independent in room/suite:  [x] Yes    []   No   Comment:    3. Pt demonstrates emotional ability to be independent in room/suite:  [x] Yes    []   No   Comment:    4. Special Considerations/Equipment if needed: [] NA   Comment: FWW, Posterior hip precautions, remove footrests when up in [facility]    Recommend independent in room/suite:  [x] Yes    []   No       Signature: Electronically signed by NELSY Washington on 2022 at 10:46 AM        Physical Therapy:  1. Pt physical ability to be independent in room/suite:  [x] Yes    []   No   Comment:     2.  Pt demonstrates cognitive ability to be independent in room/suite: [x] Yes    []   No   Comment:    3. Pt demonstrates emotional ability to be independent in room/suite:  [x] Yes    []   No   Comment:    4. Special Considerations/Equipment if needed: [] NA   Comment:WW for safest ambulation. WC to be removed from room and used for transport only. Pt to sit in high back chair. Recommend independent in room/suite:  [x] Yes    []   No       Signature: Electronically signed by Chantal Sullivan PTA on 8/1/2022 at 4:46 PM      Physician: The recommendations and comments have been reviewed.           Physician Signature: Ashlyn Greenwood DO  Electronically signed by DICK Gao, RAY on 8/2/2022 at 10:53 AM

## 2022-08-01 NOTE — CARE COORDINATION
Social/Functional Status:  Social/Functional History  Lives With: Alone  Type of Home: House  Home Layout: Two level, Able to Live on Main level with bedroom/bathroom, Bed/Bath upstairs (has BSC and couch on first level if needed)  Home Access: Stairs to enter with rails  Entrance Stairs - Number of Steps: 3 with one rail (L): 1+ 12 + landing + 4 stairs to second level (R HR ascending)  Entrance Stairs - Rails: Left  Bathroom Shower/Tub: Tub/Shower unit (on upper level)  Bathroom Toilet: Standard (adds on BSC to raise it)  Bathroom Equipment: Grab bars in shower, Hand-held shower (cannot put chair in due to grooves on bottom of tub)  Bathroom Accessibility: Walker accessible  Home Equipment: Reacher, Walker, rolling, Cane, Long-handled shoehorn, Rollator  Has the patient had two or more falls in the past year or any fall with injury in the past year?: No  ADL Assistance: Independent  Homemaking Assistance: Independent (hired son to [employer])  Homemaking Responsibilities: Yes  Ambulation Assistance: Independent (w/o device)  Transfer Assistance: Independent  Active : Yes (son and friend can assist)  Mode of Transportation: Car  Education: some college  Occupation: Retired  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 Patient questions answered appropriately. Explained discharge process. Patient stated understanding. Patient's support system consists of her three children (1 son who does not work, 1 son who works FT, and a dtr that works FT) that are all local. Pt also has a good support through her friends. Patient plans to return to her two level home where there are 3 ROSY w/ a L HR. Patient resides alone but plans to have her friend stay with her temporarily upon discharge.  Pt has the ability for first floor set up where she plans to sleep on her couch and use a bedside commode. There are 1 + 12 + landing + 4 stairs to her second floor w/ a R HR ascending to reach her bedroom/bathroom. Pt's bathroom is walker accessible and has a standard height toilet where she normally places her bsc on top of. Pt also has grab bars in the shower and a hand held shower head, pt cannot place a shower chair in due to the grooves on the floor of her tub/shower. There are 3 + landing + 8 stairs to the basement where pt's laundry is completed, pt typically gathers laundry and places in a pillow case and will throw down the stairs. Pt was completely independent with ADLs and IADLs prior to hospitalization. Pt stated that she hired her son who does not work, to [employer] her grass. Pt has a reacher, ww, cane, long handled shoe horn, and rollator. Pt stated that she typically did not use the DME and only used it after her last surgery. Pt is an active  and drives a car, pt does have her son and friend who can drive her as needed. Pt uses [facility] [address] [facility] as her pharmacy, does not have difficulty affording medications, and would like for her medications to be called in there upon discharge. Pt had [facility] in the past and would like their services again upon discharge.  Electronically signed by DICK Lyman, RAY on 8/1/2022 at 12:01 PM

## 2022-08-01 NOTE — CONSULTS
Hospitalist Consult/Progress Note  8/1/2022 10:29 AM    Assessment and Plan:   Generalized weakness, Gait instability and Decreased, Functional Status secondary to R unstable hip: S/p R total hip revision arthroplasty on 7/28/22 with orthopedic surgery. Fall precautions. PT OT to evaluate. Maximize nutrition status. Assessing if needs DME at home. SW on board. Anxiety: continue lexapro  HPL: continue statin  Bilat CTS: follows outpatient  Migraine headaches: continue topamax  L saphenous vein neuropathy: continue gabapentin  PVD; follows with vascular outpatient. Continue ASA and statin  COPD: not in exacerbation. Bowel Regimen and GI PPx: stool softners PRN ordered with hold parameters for loose stools or diarrhea. On antiacid  Diet: ADULT DIET; Regular  Advance Directive: Full Code   Nutrition status: Supplemental Vitamins ordered. Dietitian assessment  Vaccinations: Immunization records reviewed. If has not received appropriate vaccinations, will order to be given prior to discharge. DVT prophylaxis: SCDs  Discharge planning: CHRISTIANO on board. High Risk Readmission Screening Tool Score Noted. Additionally, the following hospital problems were addressed:  Principal Problem:    Impaired mobility and activities of daily living [facility]  Active Problems:    Status post total replacement of right hip    Cervical spondylosis without myelopathy    Depressive disorder    Impaired mobility and ADLs    CAD (coronary artery disease)    Cervical radiculitis, surgery pending    PADMINI (generalized anxiety disorder)    DDD (degenerative disc disease), lumbar    COPD (chronic obstructive pulmonary disease) (HCC)    Myalgia    Postlaminectomy syndrome, cervical region  Resolved Problems:    * No resolved hospital problems.  *      ** Total time spent reviewing medical records, evaluating patient, speaking with RN's and consultants where I was focused exclusively on this patient: 61 minutes. This time is excluding time spent performing procedures or significant events occurring earlier or later in the day requiring my attention and focus. Subjective:   Admit Date: 7/29/2022  PCP: Aaron Walters DO  80 y.o. female with PMH of CAD, COPD, neuropathy, PVD who is transferred to acute rehab. Underwent R total hip revision arthroplasty on 7/28/22 with orthopedic surgery. Once stable she was transferred to acute rehab for further therapies. Most recent labs reviewed. VSS. No acute events overnight. Afebrile. No new complaints. Pt denies chest pain, SOB, N/V, fevers or chills. Objective:     Vitals:    07/31/22 1950 08/01/22 0049 08/01/22 0641 08/01/22 0647   BP: (!) 131/55   136/86   Pulse: 62   65   Resp: 17 16 16 12   Temp: 98.4 °F (36.9 °C)      TempSrc: Oral   Oral   SpO2: 99%   94%   Weight:       Height:         General appearance: No acute distress,  No conversational dyspnea noted. Dentition intact. Answers questions appropriately  Neurological: Alert, awake, and oriented x3. Motor and sensory grossly intact. No focal deficits. GCS of 15. Lungs: CTAB no exp wheezes, No rales No retractions; No use of accessory muscles  Heart:  S1, S2 normal, RRR, no MRG appreciated  Abdomen: (+) BS, soft, non-tender; non distended no guarding or rigidity. Extremities:  no cyanosis,  no edema bilat lower exts, no calf tenderness bilaterally.  Dry skin noted       Medications:      sodium chloride        traMADol  50 mg Oral 4x Daily AC & HS    Vitamin D  2,000 Units Oral Dinner    cyanocobalamin  1,000 mcg IntraMUSCular Weekly    coenzyme Q10  100 mg Oral Daily    aspirin  81 mg Oral BID    escitalopram  5 mg Oral Daily    gabapentin  300 mg Oral BID    gabapentin  300 mg Oral Nightly    cetirizine  10 mg Oral Daily    rosuvastatin  10 mg Oral Nightly    topiramate  25 mg Oral BID    acetaminophen  650 mg Oral Q6H    sennosides-docusate sodium  1 tablet Oral BID    sodium chloride flush  5-40 mL IntraVENous 2 times per day       LABS Reviewed    IMAGING Reviewed    CUCA Reece NP  Rounding Hospitalist    Additional work up or/and treatment plan may be added today or then after based on clinical progression. I am managing a portion of pt care. Some medical issues are handled by other specialists and Primary Rehabilitation provider. Additional work up and treatment should be done in out pt setting by pt PCP and other out pt providers.

## 2022-08-01 NOTE — PROGRESS NOTES
Physical Therapy Rehab Treatment Note  Facility/Department: [facility]  Room: R2/R255-01       NAME: Mary Warren  : 1950 (80 y.o.)  MRN: [id number]  CODE STATUS: Full Code    Date of Service: 2022       Restrictions:  Restrictions/Precautions: Weight Bearing, Fall Risk  Lower Extremity Weight Bearing Restrictions  Right Lower Extremity Weight Bearing: Weight Bearing As Tolerated  Left Lower Extremity Weight Bearing: Weight Bearing As Tolerated  Position Activity Restriction  Hip Precautions: Posterior hip precautions       SUBJECTIVE:   Subjective: Pt reports she wants to go home tomorrow. States she feels she could get up to go to the bathroom without help. Pain  Pain: 3/10 R hip sore Pt declined intervention. OBJECTIVE:         Bed mobility  Supine to Sit: Modified independent  Sit to Supine: Modified independent    Transfers  Sit to Stand: Modified independent  Stand to sit: Modified independent  Bed to Chair: Modified independent    Ambulation  Pt ambulated short distances around room with WW indep. Pt managed tray table, bathroom door, lights, and curtain after instruction without LOB. ASSESSMENT/PROGRESS TOWARDS GOALS:   Assessment: Pt expressed frustration about D/C and would like to go home sooner. Pt followed hip prec during mobility around room. Demonstrated safe ability to maneuver around room with [employer].  Pt did have ankles crossed while resting in bed upon therapists arrival.  Reiderated importance of hip prec and towel roll given to place between knees while in bed to remind pt.     Goals:  Long Term Goals  Long term goal 1: Pt will demonstrate bed mobiilty with indep following posterior hip precautions  Long term goal 2: Pt will demonstrate functional transfers including car transfers with indep  Long term goal 3: Pt will amb 150ft with LRAD and indep  Long term goal 4: Pt will negotiate 12 steps with handrail and indep  Long term goal 5: Pt will demonstrate TUG <18 seconds to decrease fall risk    PLAN OF CARE/Safety:   Plan Comment: Cont per POC.       Therapy Time:   Individual   Time In 1610   Time Out 1620   Minutes 10     Minutes:  Transfer/Bed mobility trainin  Gait trainin  Neuro re education:0  Therapeutic ex:0      Maximo Stewart PTA, 22 at 4:43 PM

## 2022-08-01 NOTE — PROGRESS NOTES
Subjective: The patient complains of severe acute on chronic progressive fatigue and  RIGHT HIP REVISION TOTAL HIP ARTHROPLASTY pain  partially relieved by rest, medications, PT,  OT, ice and OxyIR SLP and rest and exacerbated by recent  surgery-RIGHT HIP REVISION TOTAL HIP ARTHROPLASTY (Right: Hip) with Hussain Castillo MD on 7/30/22, -had a good relief from her initial right hip prosthesis approximately a year ago however she has had it go out of position twice on her and therefore required a right hip revision. Postoperatively since this is a more involved surgery she is having increased pain and inability to ambulate. She tells me that she was seen twice in the past year for dislocations relocations the most recent being in the emergency room on 7/20/2022 for right hip dislocation that was successfully reduced in the OR. .      I am concerned about patients medical complexities and barriers to advancing in rehab goals including  pain. I reviewed current care and plans for further care with other rehab providers including nursing and case management. According to recent nursing note, \" Pt complained of pain 7/10 to right hip. Requested Ultram 50 mg po now since she refused it earlier. Pt back to bed. Resting quietly. Call light in reach and bed alarm on  \". ROS x10: The patient also complains of severely impaired mobility and activities of daily living. Otherwise no new problems with vision, hearing, nose, mouth, throat, dermal, cardiovascular, GI, , pulmonary, musculoskeletal, psychiatric or neurological. See also Acute Rehab PM&R H&P.        Vital signs:  /86   Pulse 65   Temp 98.4 °F (36.9 °C) (Oral)   Resp 12   Ht 5' 3.5\" (1.613 m)   Wt 179 lb 7.3 oz (81.4 kg)   LMP  (LMP Unknown)   SpO2 94%   BMI 31.29 kg/m²   I/O:   PO/Intake:  fair PO intake,  reg diet    Bowel:   continent constipation  Bladder: continent       General:  Patient is well developed,   adequately nourished, and    well kempt. HEENT:    Pupils equal, hearing intact to loud voice, external inspection of ear and nose benign. Inspection of lips, tongue and gums benign    Musculoskeletal: No significant change in strength or tone. All joints stable. Inspection and palpation of digits and nails show no clubbing, cyanosis or inflammatory conditions. Neuro/Psychiatric: Affect: flat but pleasant. Alert and oriented to person, place and situation with  no needed cues. No significant change in deep tendon reflexes or sensation  Lungs:  Diminished, CTA-B. Respiration effort is   normal at rest.     Heart:   S1 = S2,   RRR. Abdomen:  Soft, non-tender, no enlargement of liver or spleen.   Extremities:    lower extremity edema  -RIGHT HIP REVISION TOTAL HIP ARTHROPLASTY  Skin:   Intact to general survey, RIGHT HIP REVISION TOTAL HIP ARTHROPLASTY    Rehabilitation:  Physical Therapy:   Bed mobility:  Bed mobility  Rolling to Left: Stand by assistance (07/30/22 1138)  Rolling to Right: Stand by assistance (07/30/22 1138)  Supine to Sit: Stand by assistance (07/31/22 1146)  Sit to Supine: Stand by assistance (07/31/22 1146)  Scooting: Stand by assistance (07/30/22 1138)  Bed Mobility Comments: pillow between legs to maintain precautions (07/30/22 1138)  Transfers:  Transfers  Sit to Stand: Stand by assistance (07/30/22 1410)  Stand to sit: Stand by assistance (07/30/22 1410)  Bed to Chair: Stand by assistance (07/30/22 1138)  Stand Pivot Transfers: Stand by assistance (with 2ww) (07/30/22 1138)  Car Transfer: Stand by assistance (VCs for technique and maintaining hip prec.) (07/30/22 1155)  Comment: vc's for maintaining hip precautions (07/30/22 1138)  Gait:   Ambulation  WB Status: R LE WBAT; posterior hip precautions (07/31/22 1126)  Ambulation  Surface: carpet;level tile (07/31/22 1126)  Device: Rolling Walker (07/31/22 1126)  Assistance: Stand by assistance (07/31/22 1126)  Quality of Gait: antalgic gait, VCs to decreased twisting with change of direction (07/31/22 1126)  Gait Deviations: Slow Francoise;Decreased step length (07/31/22 1126)  Distance: 150ft x2 (07/31/22 1126)  Comments: TUG 52sec, 36 seconds Avg=44 seconds=high fall risk (07/30/22 1139)  More Ambulation?: Yes (07/31/22 1126)  Ambulation 2  Surface - 2: level tile;carpet (07/31/22 1126)  Device 2: Single point cane (07/31/22 1126)  Assistance 2: Stand by assistance (07/31/22 1126)  Quality of Gait 2: Step through patter w/ good HS, reduce Rt step length likely D/T limited Rt hip extension mobility, no LOB or unsteadiness observed (07/31/22 1126)  Distance: 75'x2 (07/31/22 1126)  Stairs:  Stairs/Curb  Stairs?: No (limited by time contraints) (07/30/22 1139)  Stairs  # Steps : 12 (07/31/22 1126)  Stairs Height: 6\" (07/31/22 1126)  Rails: Bilateral (07/30/22 1148)  Device: No Device (07/31/22 1126)  Assistance: Stand by assistance (07/31/22 1126)  Comment: Vc's and demo for proper sequencing. Pt able to ascend/descend recip w/ use of B HR's, NR w/ x1 HR. (07/31/22 1126)  W/C mobility:       Occupational Therapy:   Hand Dominance: Right  ADL  Feeding: Dentures; Modified independent  (upper dentures only) (07/30/22 0806)  Grooming: Stand by assistance (07/30/22 0806)  Grooming Skilled Clinical Factors: VC's to follow precautions (07/30/22 0806)  UE Bathing: Supervision (07/30/22 0806)  LE Bathing: Minimal assistance (07/30/22 0806)  LE Bathing Skilled Clinical Factors: pt raising L leg to wash feet, assist with R foot, VC's to follow precautions (pt trying to flex leg while seated to reach foot), (07/30/22 0806)  UE Dressing: Setup;Supervision (07/30/22 0806)  LE Dressing:  Moderate assistance (07/30/22 0806)  LE Dressing Skilled Clinical Factors: to don socks B feet, pt able to doff L sock,assist to thread LE into LB clothing (07/30/22 0806)  Toileting: Unable to assess(comment) (07/30/22 0806)  Toileting Skilled Clinical Factors: pt declined need (07/30/22 4783)  Toilet Transfers  Toilet Transfer: Unable to assess (07/30/22 1219)     Shower Transfers  Shower - Transfer From: Veniec Lozada (07/30/22 1219)  Shower - Transfer Type: To (07/30/22 1219)  Shower - Transfer To: Shower seat with back (07/30/22 1219)  Shower - Technique: Ambulating (07/30/22 1219)  Shower Transfers: Stand by assistance (07/30/22 1219)  Shower Transfers Comments: VC's to follow precautions (07/30/22 1219)    Speech Therapy:            Diet/Swallow:                      Lab/X-ray studies reviewed, analyzed and discussed with patient and staff:   No results found for this or any previous visit (from the past 24 hour(s)). XR HIP RIGHT   7/28/2022   Unremarkable alignment of the right hip prosthesis, no evidence of lucency surrounding the prosthesis. No evidence of cortical irregularity or lucency to suggest a fracture, no evidence of lytic or sclerotic bone lesion is seen. Mild degenerative bone changes are seen. The overlying soft tissues are unremarkable. Unremarkable alignment of the right hip prosthesis, no evidence of complications. XR HIP RIGHT 7/20/2022    Single frontal view of the right hip demonstrates reduction of the previously noted dislocation of the right hip prosthesis. No other significant new nor acute osseous lesion has developed. REDUCTION OF PREVIOUSLY NOTED DISLOCATION OF RIGHT HIP PROSTHESIS. XR HIP RIGHT  7/20/2022  RIGHT HIP ARTHROPLASTY. SUPERIOR DISLOCATION OF THE FEMORAL HEAD COMPONENT RELATIVE TO THE ACETABULAR CUP. Previous extensive, complex labs, notes and diagnostics reviewed and analyzed.      ALLERGIES:    Allergies as of 07/29/2022 - Fully Reviewed 07/29/2022   Allergen Reaction Noted    Doxycycline  02/25/2021    Ibandronic acid  07/27/2022    Keflex [cephalexin] Nausea Only 07/18/2017    Lidocaine  02/25/2021    Nickel  08/21/2014    Prednisone  07/21/2020    Shellfish-derived products  02/25/2021    Simvastatin  04/18/2017 Tetracyclines & related  03/09/2021    Ultram [tramadol hcl]  09/20/2011    Augmentin [amoxicillin-pot clavulanate] Nausea And Vomiting 02/25/2021      (please also verify by checking MAR)     Today I evaluated this patient for periodic reassessment of medical and functional status. The patient was discussed in detail at the treatment team meeting focusing on current medical issues, progress in therapies, social issues, psychological issues, barriers to progress and strategies to address these barriers, and discharge planning. See the addendum to rehab progress note-as a second progress note in the chart. The patient continues to be high risk for future disability and their medical and rehabilitation prognosis continue to be good and therefore, we will continue the patient's rehabilitation course as planned. The patient's tentative discharge date was set. Patient and family education was discussed. The patient was made aware of the team discussion regarding their progress. Complex Physical Medicine & Rehab Issues Assess & Plan:   Severe abnormality of gait and mobility and impaired self-care and ADL's secondary to   RIGHT HIP REVISION TOTAL HIP ARTHROPLASTY . Functional and medical status reassessed regarding patients ability to participate in therapies and patient found to be able to participate in acute intensive comprehensive inpatient rehabilitation program including PT/OT to improve balance, ambulation, ADLs, and to improve the P/AROM. Therapeutic modifications regarding activities in therapies, place, amount of time per day and intensity of therapy made daily. In bed therapies or bedside therapies prn. Bowel and Bladder dysfunction  , Neurogenic bowel and bladder:  frequent toileting, ambulate to bathroom with assistance, check post void residuals. Check for C.difficile x1 if >2 loose stools in 24 hours, continue bowel & bladder program.  Monitor bowel and bladder function.   Lactinex 2 PO every AC.  MOM prn, Brown Bomb prn, Glycerin suppository prn, enema prn. Encourage therapy and nursing to co-treat and problem solve re continence. Severe RIGHT HIP REVISION TOTAL HIP ARTHROPLASTY pain as well as generalized OA pain: reassess pain every shift and prior to and after each therapy session, give Scheduled Ultram with Tylenol and prn Tylenol and consider scheduled Tylenol, modalities prn in therapy, masage, Lidoderm, K-pad prn. Consider scheduled AM pain meds. Skin healing  RIGHT HIP REVISION TOTAL HIP ARTHROPLASTY incision and breakdown risk:  continue pressure relief program.  Daily skin exams and reports from nursing. Fatigue due to nutritional and hydration deficiency: Add and titrate vitamin B12 vitamin D and CoQ10 continue to monitor I&Os, calorie counts prn, dietary consult prn. Add healthy snack at night. Acute episodic insomnia with situational adjustment disorder:  prn Ambien, monitor for day time sedation. Falls risk elevated:  patient to use call light to get nursing assistance to get up, bed and chair alarm. Elevated DVT risk: progressive activities in PT, continue prophylaxis  LUIS hose, elevation and  ASA . Complex discharge planning:  Weekly team meeting every Monday to re-assess progress towards goals, discuss and address social, psychological and medical comorbidities and to address difficulties they may be having progressing in therapy. Patient and family education is in progress. The patient is to follow-up with their family physician after discharge.         Complex Active General Medical Issues that complicate care Assess & Plan:      Status post total replacement of right hip-strict hip precautions as this is her first revision and hit her hip popped out twice over the past year.-Follow-up with Dr. De La Cruz Done as an outpatient     PADMINI (generalized anxiety disorder), Depressive disorder-emotional support provided daily, vitamin B12, encourage participation in rehabilitation support group and recreational therapy, adjust/add medications (Lexapro, Ativan, add vitamin D)   Cervical spondylosis without myelopathy Cervical radiculitis, DDD (degenerative disc disease), lumbar,  Postlaminectomy syndrome, cervical region, Myalgia-lowest effective dose of pain medication, treat depression, titrate Neurontin and Topamax  HTN, CAD (coronary artery disease)-Acute rehab to monitor heart rate and rhythm with the option of telemetry and the effects of chronotropic medication with respect to increasing physical activity and exercise in PT, OT, ADLs with medication titration to lowest effective dosing. Continue blood signs every shift focusing on heart rate, rhythm and blood pressure checks with orthostatic checks-monitoring the effect of exercise, therapy and posture. Consult hospitalist for backup medical and adjust/add medications (Crestor). Monitor heart rate and blood pressure as well as medications effects on vital signs before during and after therapy with especial focus on preventing orthostasis and falls risk. COPD (chronic obstructive pulmonary disease)-Acute rehab for endurance traing with Pulse Ox to monitoring oxygen saturation and heart rate with O2 titration to lowest effective dose. Pulse oximeter checks to shift and at HS to dose and titrate oxygen and aerosol treatments monitor for nocturnal hypoxemia, monitor vital signs, oxygen prn. Focus on energy conservation.        Focus of today's plan-  Initiate and modify therapuetic plan to meet patients individual needs, add rest breaks as needed, and controll painn    Electronically signed by Mere Dixon DO on 8/1/22 at 7:57 AM EDT       Donovan Nuñez D.O., PM&R     Attending    [address]

## 2022-08-01 NOTE — PROGRESS NOTES
Pt called to go to bathroom with walker and one assist. Pt complained of pain 7/10 to right hip. Requested Ultram 50 mg po now since she refused it earlier. Pt back to bed. Resting quietly. Call light in reach and bed alarm on Electronically signed by Emanuel Kay.  Paulina Albert on 8/1/2022 at 1:04 AM

## 2022-08-02 PROCEDURE — 6370000000 HC RX 637 (ALT 250 FOR IP): Performed by: CLINICAL NURSE SPECIALIST

## 2022-08-02 PROCEDURE — 97110 THERAPEUTIC EXERCISES: CPT

## 2022-08-02 PROCEDURE — 1180000000 HC REHAB R&B

## 2022-08-02 PROCEDURE — 6370000000 HC RX 637 (ALT 250 FOR IP): Performed by: NURSE PRACTITIONER

## 2022-08-02 PROCEDURE — 6370000000 HC RX 637 (ALT 250 FOR IP): Performed by: PHYSICAL MEDICINE & REHABILITATION

## 2022-08-02 PROCEDURE — 97116 GAIT TRAINING THERAPY: CPT

## 2022-08-02 PROCEDURE — 97535 SELF CARE MNGMENT TRAINING: CPT

## 2022-08-02 PROCEDURE — 99232 SBSQ HOSP IP/OBS MODERATE 35: CPT | Performed by: PHYSICAL MEDICINE & REHABILITATION

## 2022-08-02 PROCEDURE — 97112 NEUROMUSCULAR REEDUCATION: CPT

## 2022-08-02 RX ADMIN — TRAMADOL HYDROCHLORIDE 50 MG: 50 TABLET, COATED ORAL at 17:39

## 2022-08-02 RX ADMIN — ROSUVASTATIN CALCIUM 10 MG: 10 TABLET, FILM COATED ORAL at 21:05

## 2022-08-02 RX ADMIN — ASPIRIN 81 MG: 81 TABLET, COATED ORAL at 21:05

## 2022-08-02 RX ADMIN — TRAMADOL HYDROCHLORIDE 50 MG: 50 TABLET, COATED ORAL at 21:05

## 2022-08-02 RX ADMIN — GABAPENTIN 300 MG: 300 CAPSULE ORAL at 08:32

## 2022-08-02 RX ADMIN — TRAMADOL HYDROCHLORIDE 50 MG: 50 TABLET, COATED ORAL at 11:16

## 2022-08-02 RX ADMIN — Medication 100 MG: at 08:33

## 2022-08-02 RX ADMIN — ACETAMINOPHEN 650 MG: 325 TABLET ORAL at 17:39

## 2022-08-02 RX ADMIN — LORAZEPAM 1 MG: 1 TABLET ORAL at 21:05

## 2022-08-02 RX ADMIN — SENNOSIDES AND DOCUSATE SODIUM 1 TABLET: 50; 8.6 TABLET ORAL at 08:32

## 2022-08-02 RX ADMIN — ACETAMINOPHEN 650 MG: 325 TABLET, FILM COATED ORAL at 15:15

## 2022-08-02 RX ADMIN — ASPIRIN 81 MG: 81 TABLET, COATED ORAL at 08:32

## 2022-08-02 RX ADMIN — GABAPENTIN 300 MG: 300 CAPSULE ORAL at 21:05

## 2022-08-02 RX ADMIN — TRAMADOL HYDROCHLORIDE 50 MG: 50 TABLET, COATED ORAL at 05:15

## 2022-08-02 RX ADMIN — ESCITALOPRAM OXALATE 5 MG: 10 TABLET ORAL at 08:33

## 2022-08-02 RX ADMIN — ACETAMINOPHEN 650 MG: 325 TABLET ORAL at 11:16

## 2022-08-02 RX ADMIN — Medication 2000 UNITS: at 17:39

## 2022-08-02 ASSESSMENT — PAIN DESCRIPTION - LOCATION
LOCATION: HIP

## 2022-08-02 ASSESSMENT — PAIN SCALES - GENERAL
PAINLEVEL_OUTOF10: 4
PAINLEVEL_OUTOF10: 4
PAINLEVEL_OUTOF10: 5
PAINLEVEL_OUTOF10: 0
PAINLEVEL_OUTOF10: 5

## 2022-08-02 ASSESSMENT — PAIN DESCRIPTION - ORIENTATION
ORIENTATION: RIGHT

## 2022-08-02 ASSESSMENT — PAIN DESCRIPTION - DESCRIPTORS: DESCRIPTORS: ACHING

## 2022-08-02 NOTE — PLAN OF CARE
Problem: Discharge Planning  Goal: Discharge to home or other facility with appropriate resources  8/2/2022 1037 by Sheldon Cm RN  Outcome: Progressing  8/1/2022 2229 by Parviz Murguia RN  Outcome: Progressing     Problem: Safety - Adult  Goal: Free from fall injury  8/2/2022 1037 by Sheldon Cm RN  Outcome: Progressing  8/1/2022 2229 by Parviz Murguia RN  Outcome: Progressing     Problem: ABCDS Injury Assessment  Goal: Absence of physical injury  8/2/2022 1037 by Sheldon Cm RN  Outcome: Progressing  8/1/2022 2229 by Parviz Murguia RN  Outcome: Progressing     Problem: Pain  Goal: Verbalizes/displays adequate comfort level or baseline comfort level  8/2/2022 1037 by Sheldon Cm RN  Outcome: Progressing  8/1/2022 2229 by Parviz Murguia RN  Outcome: Progressing

## 2022-08-02 NOTE — PROGRESS NOTES
Stop  Subjective: The patient complains of severe acute on chronic progressive fatigue and  RIGHT HIP REVISION TOTAL HIP ARTHROPLASTY pain  partially relieved by rest, medications, PT,  OT, ice and OxyIR SLP and rest and exacerbated by recent  surgery-RIGHT HIP REVISION TOTAL HIP ARTHROPLASTY (Right: Hip) with Arti Plummer MD on 7/30/22, -had a good relief from her initial right hip prosthesis approximately a year ago however she has had it go out of position twice on her and therefore required a right hip revision. Postoperatively since this is a more involved surgery she is having increased pain and inability to ambulate. She tells me that she was seen twice in the past year for dislocations relocations the most recent being in the emergency room on 7/20/2022 for right hip dislocation that was successfully reduced in the OR. .      I am concerned about patients medical complexities and barriers to advancing in rehab goals including  pain. I reviewed current care and plans for further care with other rehab providers including nursing and case management. According to recent nursing note, \" VSS. Medicated with scheduled ultram for 5/10 right hip pain. Aquacell dressing intact, scant amount of drainage present to mid-upper portion of dressing. WBAT. LBM 7/31. No distress noted. Call light within reach and bed alarm activated. \".    ROS x10: The patient also complains of severely impaired mobility and activities of daily living. Otherwise no new problems with vision, hearing, nose, mouth, throat, dermal, cardiovascular, GI, , pulmonary, musculoskeletal, psychiatric or neurological. See also Acute Rehab PM&R H&P.        Vital signs:  /63   Pulse 73   Temp 98.1 °F (36.7 °C) (Oral)   Resp 16   Ht 5' 3.5\" (1.613 m)   Wt 179 lb 7.3 oz (81.4 kg)   LMP  (LMP Unknown)   SpO2 94%   BMI 31.29 kg/m²   I/O:   PO/Intake:  fair PO intake,  reg diet    Bowel:   continent constipation resolving-LBM 7/31.  Bladder: continent       General:  Patient is well developed,   adequately nourished, and    well kempt. HEENT:    Pupils equal, hearing intact to loud voice, external inspection of ear and nose benign. Inspection of lips, tongue and gums benign    Musculoskeletal: No significant change in strength or tone. All joints stable. Inspection and palpation of digits and nails show no clubbing, cyanosis or inflammatory conditions. Neuro/Psychiatric: Affect: flat but pleasant. Alert and oriented to person, place and situation with  no needed cues. No significant change in deep tendon reflexes or sensation  Lungs:  Diminished, CTA-B. Respiration effort is   normal at rest.     Heart:   S1 = S2,   RRR. Abdomen:  Soft, non-tender, no enlargement of liver or spleen.   Extremities:    lower extremity edema  -RIGHT HIP REVISION TOTAL HIP ARTHROPLASTY  Skin:   Intact to general survey, RIGHT HIP REVISION TOTAL HIP ARTHROPLASTY    Rehabilitation:  Physical Therapy:   Bed mobility:  Bed mobility  Rolling to Left: Modified independent (Instructed pt to place pillow between knees before rolling.) (08/01/22 1045)  Rolling to Right: Unable to assess (d/t recent hip surgery) (08/01/22 1045)  Supine to Sit: Modified independent (08/01/22 1636)  Sit to Supine: Modified independent (08/01/22 1636)  Scooting: Stand by assistance (07/30/22 1138)  Bed Mobility Comments: pillow between legs to maintain precautions (07/30/22 1138)  Transfers:  Transfers  Sit to Stand: Modified independent (08/01/22 1636)  Stand to sit: Modified independent (08/01/22 1636)  Bed to Chair: Modified independent (08/01/22 1636)  Stand Pivot Transfers: Stand by assistance (with 2ww) (07/30/22 1138)  Car Transfer: Supervision (VCs for technique.) (08/01/22 1033)  Comment: vc's for maintaining hip precautions (07/30/22 1138)  Gait:   Ambulation  WB Status: R LE WBAT; posterior hip precautions (08/01/22 1446)  Ambulation  Surface: carpet;level tile;ramp (08/01/22 1446)  Device: Rolling Walker (08/01/22 1446)  Assistance: Supervision (08/01/22 1446)  Quality of Gait: antalgic gait, VCs to decreased twisting with change of direction (08/01/22 1156)  Gait Deviations: Slow Francoise;Decreased step length (07/31/22 1126)  Distance: 250ft (08/01/22 1446)  Comments: TUG 52sec, 36 seconds Avg=44 seconds=high fall risk (07/30/22 1139)  More Ambulation?: Yes (07/31/22 1126)  Ambulation 2  Surface - 2: level tile;carpet (07/31/22 1126)  Device 2: Single point cane (07/31/22 1126)  Assistance 2: Stand by assistance (07/31/22 1126)  Quality of Gait 2: Step through patter w/ good HS, reduce Rt step length likely D/T limited Rt hip extension mobility, no LOB or unsteadiness observed (07/31/22 1126)  Distance: 75'x2 (07/31/22 1126)  Stairs:  Stairs/Curb  Stairs?: Yes (08/01/22 1450)  Stairs  # Steps : 12 (08/01/22 1450)  Stairs Height: 6\" (08/01/22 1450)  Rails: Left ascending (08/01/22 1450)  Device: Single pt cane (08/01/22 1450)  Assistance: Supervision (08/01/22 1450)  Comment: Completed with 1 rail ascending on L to simulate home enterance and ascending R rail and st cane in L to simulate stairs 2nd floor bed and bath. Instructed pt on sequencing with st cane. (08/01/22 1156)  W/C mobility:       Occupational Therapy:   Hand Dominance: Right  ADL  Feeding: Dentures; Modified independent  (upper dentures only) (07/30/22 0806)  Grooming: Stand by assistance (07/30/22 0806)  Grooming Skilled Clinical Factors: VC's to follow precautions (07/30/22 0806)  UE Bathing: Supervision (07/30/22 0806)  LE Bathing: Minimal assistance (07/30/22 0806)  LE Bathing Skilled Clinical Factors: pt raising L leg to wash feet, assist with R foot, VC's to follow precautions (pt trying to flex leg while seated to reach foot), (07/30/22 0806)  UE Dressing: Setup;Supervision (07/30/22 0806)  LE Dressing:  Moderate assistance (07/30/22 0806)  LE Dressing Skilled Clinical Factors: to don socks B feet, pt able to doff L sock,assist to thread LE into LB clothing (07/30/22 0806)  Toileting: Unable to assess(comment) (07/30/22 0806)  Toileting Skilled Clinical Factors: pt declined need (07/30/22 0806)  Toilet Transfers  Toilet Transfer: Unable to assess (07/30/22 1219)     Shower Transfers  Shower - Transfer From: Marleny Romano (07/30/22 1219)  Shower - Transfer Type: To (07/30/22 1219)  Shower - Transfer To: Shower seat with back (07/30/22 1219)  Shower - Technique: Ambulating (07/30/22 1219)  Shower Transfers: Stand by assistance (07/30/22 1219)  Shower Transfers Comments: VC's to follow precautions (07/30/22 1219)    Speech Therapy:            Diet/Swallow:                      Lab/X-ray studies reviewed, analyzed and discussed with patient and staff:   No results found for this or any previous visit (from the past 24 hour(s)). XR HIP RIGHT   7/28/2022   Unremarkable alignment of the right hip prosthesis, no evidence of lucency surrounding the prosthesis. No evidence of cortical irregularity or lucency to suggest a fracture, no evidence of lytic or sclerotic bone lesion is seen. Mild degenerative bone changes are seen. The overlying soft tissues are unremarkable. Unremarkable alignment of the right hip prosthesis, no evidence of complications. XR HIP RIGHT 7/20/2022    Single frontal view of the right hip demonstrates reduction of the previously noted dislocation of the right hip prosthesis. No other significant new nor acute osseous lesion has developed. REDUCTION OF PREVIOUSLY NOTED DISLOCATION OF RIGHT HIP PROSTHESIS. XR HIP RIGHT  7/20/2022  RIGHT HIP ARTHROPLASTY. SUPERIOR DISLOCATION OF THE FEMORAL HEAD COMPONENT RELATIVE TO THE ACETABULAR CUP. Previous extensive, complex labs, notes and diagnostics reviewed and analyzed.      ALLERGIES:    Allergies as of 07/29/2022 - Fully Reviewed 07/29/2022   Allergen Reaction Noted    Doxycycline  02/25/2021    Ibandronic acid  07/27/2022    Keflex [cephalexin] Nausea Only 07/18/2017    Lidocaine  02/25/2021    Nickel  08/21/2014    Prednisone  07/21/2020    Shellfish-derived products  02/25/2021    Simvastatin  04/18/2017    Tetracyclines & related  03/09/2021    Ultram [tramadol hcl]  09/20/2011    Augmentin [amoxicillin-pot clavulanate] Nausea And Vomiting 02/25/2021      (please also verify by checking MAR)      Recently, I evaluated this patient for periodic reassessment of medical and functional status. The patient was discussed in detail at the treatment team meeting focusing on current medical issues, progress in therapies, social issues, psychological issues, barriers to progress and strategies to address these barriers, and discharge planning. See the hand written addendum to rehab progress note. The patient continues to be high risk for future disability and their medical and rehabilitation prognosis continue to be good and therefore, we will continue the patient's rehabilitation course as planned. The patient's tentative discharge date was set. Patient and family education was discussed. The patient was made aware of the team discussion regarding their progress. Discharge plans were discussed along with barriers to progress and strategies to address these barriers, patient encouraged to continue to discuss discharge plans with . Complex Physical Medicine & Rehab Issues Assess & Plan:   Severe abnormality of gait and mobility and impaired self-care and ADL's secondary to   RIGHT HIP REVISION TOTAL HIP ARTHROPLASTY . Functional and medical status reassessed regarding patients ability to participate in therapies and patient found to be able to participate in acute intensive comprehensive inpatient rehabilitation program including PT/OT to improve balance, ambulation, ADLs, and to improve the P/AROM.   Therapeutic modifications regarding activities in therapies, place, amount of time per day and intensity of therapy made daily. In bed therapies or bedside therapies prn. Bowel and Bladder dysfunction  , Neurogenic bowel and bladder:  frequent toileting, ambulate to bathroom with assistance, check post void residuals. Check for C.difficile x1 if >2 loose stools in 24 hours, continue bowel & bladder program.  Monitor bowel and bladder function. Lactinex 2 PO every AC. MOM prn, Brown Bomb prn, Glycerin suppository prn, enema prn. Encourage therapy and nursing to co-treat and problem solve re continence. Severe RIGHT HIP REVISION TOTAL HIP ARTHROPLASTY pain as well as generalized OA pain: reassess pain every shift and prior to and after each therapy session, give Scheduled Ultram with Tylenol and prn Tylenol and consider scheduled Tylenol, modalities prn in therapy, masage, Lidoderm, K-pad prn. Consider scheduled AM pain meds. Skin healing  RIGHT HIP REVISION TOTAL HIP ARTHROPLASTY incision and breakdown risk:  continue pressure relief program.  Daily skin exams and reports from nursing. Fatigue due to nutritional and hydration deficiency: Add and titrate vitamin B12 vitamin D and CoQ10 continue to monitor I&Os, calorie counts prn, dietary consult prn. Add healthy snack at night. Acute episodic insomnia with situational adjustment disorder:  prn Ambien, monitor for day time sedation. Falls risk elevated:  patient to use call light to get nursing assistance to get up, bed and chair alarm. Elevated DVT risk: progressive activities in PT, continue prophylaxis  LUIS hose, elevation and  ASA . Complex discharge planning: Patient is doing much better functionally and okay to transition to independent in the room. Date of discharge August 3, 2022 to home with friends who will be staying temporarily and [facility].   Patient is status post weekly team meeting   Monday to re-assess progress towards goals, discuss and address social, psychological and medical comorbidities and to address difficulties they may be having progressing in therapy. Patient and family education is in progress. The patient is to follow-up with their family physician after discharge. Complex Active General Medical Issues that complicate care Assess & Plan:      Status post total replacement of right hip-strict hip precautions as this is her first revision and hit her hip popped out twice over the past year.-Follow-up with Dr. Ophelia Hirsch as an outpatient     PADMINI (generalized anxiety disorder), Depressive disorder-emotional support provided daily, vitamin B12, encourage participation in rehabilitation support group and recreational therapy, adjust/add medications (Lexapro, Ativan, add vitamin D)   Cervical spondylosis without myelopathy Cervical radiculitis, DDD (degenerative disc disease), lumbar,  Postlaminectomy syndrome, cervical region, Myalgia-lowest effective dose of pain medication, treat depression, titrate Neurontin and Topamax  HTN, CAD (coronary artery disease)-Acute rehab to monitor heart rate and rhythm with the option of telemetry and the effects of chronotropic medication with respect to increasing physical activity and exercise in PT, OT, ADLs with medication titration to lowest effective dosing. Continue blood signs every shift focusing on heart rate, rhythm and blood pressure checks with orthostatic checks-monitoring the effect of exercise, therapy and posture. Consult hospitalist for backup medical and adjust/add medications (Crestor). Monitor heart rate and blood pressure as well as medications effects on vital signs before during and after therapy with especial focus on preventing orthostasis and falls risk. COPD (chronic obstructive pulmonary disease)-Acute rehab for endurance traing with Pulse Ox to monitoring oxygen saturation and heart rate with O2 titration to lowest effective dose.  Pulse oximeter checks to shift and at HS to dose and titrate oxygen and aerosol treatments monitor for nocturnal hypoxemia, monitor vital signs, oxygen prn. Focus on energy conservation. Focus on coordinating dc plans with patient family and care givers and order necessary equipment.       Electronically signed by Kurt Medina DO on 8/1/22 at 7:57 AM JULIETAT       Alba Overton D.O., PM&R     Attending    [facility] Therese Gaviria

## 2022-08-02 NOTE — PROGRESS NOTES
OCCUPATIONAL THERAPY  INPATIENT REHAB TREATMENT NOTE  [facility]      NAME: Ada Macias  : 1950 (80 y.o.)  MRN: [id number]  CODE STATUS: Full Code  Room: R255/R255-01    Date of Service: 2022    Referring Physician: Dr. Allen Wilcox Diagnosis: Impaired mobility and ADL's due to [address]    Restrictions  Restrictions/Precautions  Restrictions/Precautions: Weight Bearing, Fall Risk   Lower Extremity Weight Bearing Restrictions  Right Lower Extremity Weight Bearing: Weight Bearing As Tolerated     Position Activity Restriction  Hip Precautions: Posterior hip precautions    Patient's date of birth confirmed: Yes    SAFETY:  Safety Devices  Safety Devices in place: Yes  Type of devices: Bed alarm in place; All fall risk precautions in place;Call light within reach    SUBJECTIVE:  Subjective: \"I am really looking forward on going to the [employer] this year. \"    Pain at start of treatment: No    Pain at end of treatment: No    Location: \"Hip soreness\" 3/10  Nursing notified: Declined      COGNITION:  Orientation  Overall Orientation Status: Within Functional Limits  Cognition  Overall Cognitive Status: WFL  Cognition Comment: Comp: Independent; Express: Independent; Soc Inter: Independent; Mem: Mod Independent; Prob Solv: Mod Independent      Pt's current cognitive status is:  Comprehension: Independent  Expression: Independent  Social Interaction: Independent  Problem Solving: Mod I  Memory: Mod I    OBJECTIVE:         Feeding  Assistance Level: Independent  Grooming/Oral Hygiene  Assistance Level: Modified independent  Upper Extremity Bathing  Assistance Level: Independent  Lower Extremity Bathing  Assistance Level: Modified independent  Upper Extremity Dressing  Assistance Level:  Independent  Lower Extremity Dressing  Equipment Provided: Reachers;Sock aid  Assistance Level: Modified independent  Putting On/Taking Off Footwear  Equipment Provided: Sock aid  Assistance Level: Modified independent  Skilled Clinical Factors: Pt demonstrated good use of sock aid to don non-skid socks. Toileting  Assistance Level: Modified independent (use of grab bars)  Toilet Transfers  Technique: Stand step  Equipment: Grab bars  Additional Factors: With handrails  Assistance Level: Modified independent  Tub/Shower Transfers  Type: Shower  Transfer From: Rolling walker  Transfer To: Shower chair with back  Additional Factors: With handrails  Assistance Level: Modified independent         Functional Mobility  Device: Rolling walker  Activity: Retrieve items; To/From bathroom;Transport items  Assistance Level: Modified independent  Skilled Clinical Factors: Pt performed clothing retrieval with FWW to improve pt overall standing tolerance, standing balance, functional reaching and activity endurance for ADLs. Pt able to retrieve clothing and drape on front of FWW. Pt able to reach outside MYRA with no LOB. Sit to Supine  Assistance Level: Modified independent  Skilled Clinical Factors: Increased time to complete for safety and to maintain hip precuations  Supine to Sit  Assistance Level: Modified independent  Transfers  Surface: To bed; Wheelchair;From chair with arms  Additional Factors: Increased time to complete  Sit to Stand  Assistance Level: Modified independent  Stand to Sit  Assistance Level: Modified independent  Bed To/From Chair  Technique: Stand step  Assistance Level: Modified independent  Skilled Clinical Factors: Good safety awareness with use of chair- unlock/lock breaks without reminders                   Vision  Patient Visual Report: No visual complaint reported. Perception  Overall Perceptual Status: WFL                   Education:  Education  Education Given To: Patient  Education Provided: Safety;Equipment  Education Provided Comments: Pt educated on use of sock aid. Pt demonstrated good use of sock aid.   Education Method: Demonstration;Verbal;Teach

## 2022-08-02 NOTE — CARE COORDINATION
RAY was notified by nursing that during rounding this AM, physician approved for pt to discharge home on 8/3. RAY received a return call from Sundar Lindsay at [facility] and she requested for referral to be faxed to [phone]. Sundar Lindsay also noted that Rosalino Tejada would be out to see pt on either Thursday or Friday of this week.  Electronically signed by DICK Ramsey LSW on 8/2/2022 at 1:59 PM

## 2022-08-02 NOTE — PROGRESS NOTES
Physical Therapy Rehab Treatment Note  [facility]  Room: R255/R255-01       NAME: Ayush Shaw  : 1950 (80 y.o.)  MRN: [id number]  CODE STATUS: Full Code    Date of Service: 2022       Restrictions:  Restrictions/Precautions: Weight Bearing, Fall Risk  Lower Extremity Weight Bearing Restrictions  Right Lower Extremity Weight Bearing: Weight Bearing As Tolerated  Left Lower Extremity Weight Bearing: Weight Bearing As Tolerated  Position Activity Restriction  Hip Precautions: Posterior hip precautions       SUBJECTIVE:   Subjective: Pt reports she is going home tomorrow after therapy. States she feels she is ready to go home. Pain  Pain: 4/10 R hip sore Pt declined intervention. OBJECTIVE:            Transfers  Sit to Stand: Modified independent  Stand to sit: Modified independent  Bed to Chair: Modified independent  Car Transfer: Modified independent    Ambulation  WB Status: R LE WBAT; posterior hip precautions  Ambulation  Surface: level tile;uneven;carpet  Device: Rolling Walker  Assistance: Modified Independent  Quality of Gait: mild antalgic pattern  Distance: 350ft  Comments: VCs to keep hands on [employer] while ambulating when distracted. TUG 15.21 with [employer]    Stairs/Curb  Stairs?: Yes  Stairs  # Steps : 12  Stairs Height: 6\"  Rails: Left ascending  Device: Single pt cane  Assistance: Modified independent           PT Exercises  Standing Open/Closed Kinetic Chain Exercises: sink ex x10 ea         Education Provided: Precautions; Safety; Mobility Training;Home Exercise Program;Transfer Training  Education  Education Given To: Patient  Education Provided: Precautions; Safety; Mobility Training;Home Exercise Program;Transfer Training  Education Provided Comments: Reviewed and issued sink ex for HEP. Pt completed indep. Reviewed and issued pt understanding your precautions and your recovery handouts.   Education Method: Demonstration;Verbal;Printed Information/Hand-outs  Education Outcome: Verbalized understanding;Demonstrated understanding        ASSESSMENT/PROGRESS TOWARDS GOALS:   Assessment: Pt has met goals. Improving awareness and ability to follow hip prec. Pt can be mildly impulsive with quick movements requiring occasional cues to improve safety. Improved gait speed and decreased risk for falls with improved TUG time from 44 to 15.21sec. Improved carryover of car transfer technique. Goals:  Long Term Goals  Long term goal 1: Pt will demonstrate bed mobiilty with indep following posterior hip precautions  Long term goal 2: Pt will demonstrate functional transfers including car transfers with indep  Long term goal 3: Pt will amb 150ft with LRAD and indep  Long term goal 4: Pt will negotiate 12 steps with handrail and indep  Long term goal 5: Pt will demonstrate TUG <18 seconds to decrease fall risk    PLAN OF CARE/Safety:   Plan Comment: Cont per POC. Pt scheduled to D/C home tomorrow.     Therapy Time:   Individual   Time In 1000   Time Out 1100   Minutes 60     Minutes:  Transfer/Bed mobility training:10  Gait trainin  Neuro re education:0  Therapeutic ex:20      Ashley Roach PTA, 22 at 11:15 AM

## 2022-08-02 NOTE — PLAN OF CARE
Problem: Discharge Planning  Goal: Discharge to home or other facility with appropriate resources  8/1/2022 2229 by Faviola Hernández RN  Outcome: Progressing     Problem: Safety - Adult  Goal: Free from fall injury  8/1/2022 2229 by Faviola Hernández RN  Outcome: Progressing     Problem: ABCDS Injury Assessment  Goal: Absence of physical injury  8/1/2022 2229 by Faviola Hernández RN  Outcome: Progressing     Problem: Pain  Goal: Verbalizes/displays adequate comfort level or baseline comfort level  8/1/2022 2229 by Faviola Hernández RN  Outcome: Progressing

## 2022-08-02 NOTE — PROGRESS NOTES
Physical Therapy Rehab Treatment Note  Facility/Department: [facility]  [facility]       NAME: Shruti Lopez  : 1950 (57 y.o.)  MRN: [id number]  CODE STATUS: Full Code    Date of Service: 2022       Restrictions:posterior hip precautions          SUBJECTIVE:   Subjective: I\"m ready to go home tomorrow\"    Pain  Pain: 4/10 R hip sore Pt declined intervention. OBJECTIVE:   Orientation  Overall Orientation Status: Within Functional Limits  Cognition  Overall Cognitive Status: WFL         Bed Mobility Training  Bed Mobility Training: Yes  Overall Level of Assistance: Independent  Supine to Sit: Independent  Sit to Supine: Independent  Scooting: Independent    Transfer Training  Transfer Training: Yes  Overall Level of Assistance: Modified independent  Interventions: Verbal cues  Sit to Stand: Modified independent  Stand to Sit: Modified independent  Car Transfer: Modified independent    Gait Training: Yes  Overall Level of Assistance: Modified independent  Distance (ft): 200 Feet  Assistive Device: Walker, rolling  Interventions: Verbal cues  Base of Support: Narrowed  Speed/Francoise: Fluctuations  Step Length: Right shortened  Gait Abnormalities: Circumduction  Rail Use: Right  Right Side Weight Bearing: As tolerated  Left Side Weight Bearing: As tolerated  Uneven Terrain - Level of Assistance: Modified independent  Additional gait 250 to room with mod I. Stairs - Level of Assistance: Supervision/ Modified Independent  Number of Stairs Trained: 16    Wheelchair Management  Wheelchair Management: No    Neuromuscular Education  Neuromuscular Comments: around bolsters with ww. focus on hip precaution protocol. sit to stand x 5. PT Exercises  Standing Open/Closed Kinetic Chain Exercises: marching x 20, heel raises x 20                        ASSESSMENT/PROGRESS TOWARDS GOALS: pt progressing towards goals daily.         Goals:  Long Term Goals  Long term goal 1: Pt will demonstrate bed mobiilty with indep following posterior hip precautions  Long term goal 2: Pt will demonstrate functional transfers including car transfers with indep  Long term goal 3: Pt will amb 150ft with LRAD and indep  Long term goal 4: Pt will negotiate 16 steps with handrail and indep  Long term goal 5: Pt will demonstrate TUG <18 seconds to decrease fall risk    PLAN OF CARE/Safety: ongoing         Therapy Time:   Individual   Time In 1300   Time Out 1400   Minutes 60     Minutes:60  Transfer/Bed mobility training:15  Gait trainin  Neuro re education:15  Therapeutic ex:10      Charis Pack PTA, 22 at 1:49 PM

## 2022-08-03 VITALS
WEIGHT: 179.45 LBS | RESPIRATION RATE: 17 BRPM | HEART RATE: 62 BPM | TEMPERATURE: 98.2 F | DIASTOLIC BLOOD PRESSURE: 77 MMHG | OXYGEN SATURATION: 98 % | BODY MASS INDEX: 30.64 KG/M2 | SYSTOLIC BLOOD PRESSURE: 135 MMHG | HEIGHT: 64 IN

## 2022-08-03 PROCEDURE — 97535 SELF CARE MNGMENT TRAINING: CPT

## 2022-08-03 PROCEDURE — 97110 THERAPEUTIC EXERCISES: CPT

## 2022-08-03 PROCEDURE — 97530 THERAPEUTIC ACTIVITIES: CPT

## 2022-08-03 PROCEDURE — 6370000000 HC RX 637 (ALT 250 FOR IP): Performed by: PHYSICAL MEDICINE & REHABILITATION

## 2022-08-03 PROCEDURE — 99239 HOSP IP/OBS DSCHRG MGMT >30: CPT | Performed by: PHYSICAL MEDICINE & REHABILITATION

## 2022-08-03 PROCEDURE — 6370000000 HC RX 637 (ALT 250 FOR IP): Performed by: CLINICAL NURSE SPECIALIST

## 2022-08-03 PROCEDURE — 97116 GAIT TRAINING THERAPY: CPT

## 2022-08-03 PROCEDURE — 6370000000 HC RX 637 (ALT 250 FOR IP): Performed by: NURSE PRACTITIONER

## 2022-08-03 RX ADMIN — TRAMADOL HYDROCHLORIDE 50 MG: 50 TABLET, COATED ORAL at 11:37

## 2022-08-03 RX ADMIN — GABAPENTIN 300 MG: 300 CAPSULE ORAL at 08:12

## 2022-08-03 RX ADMIN — ESCITALOPRAM OXALATE 5 MG: 10 TABLET ORAL at 08:12

## 2022-08-03 RX ADMIN — TRAMADOL HYDROCHLORIDE 50 MG: 50 TABLET, COATED ORAL at 04:59

## 2022-08-03 RX ADMIN — ACETAMINOPHEN 650 MG: 325 TABLET ORAL at 11:37

## 2022-08-03 RX ADMIN — ACETAMINOPHEN 650 MG: 325 TABLET ORAL at 04:58

## 2022-08-03 RX ADMIN — ASPIRIN 81 MG: 81 TABLET, COATED ORAL at 08:12

## 2022-08-03 RX ADMIN — SENNOSIDES AND DOCUSATE SODIUM 1 TABLET: 50; 8.6 TABLET ORAL at 08:12

## 2022-08-03 RX ADMIN — Medication 100 MG: at 08:12

## 2022-08-03 RX ADMIN — LORAZEPAM 1 MG: 1 TABLET ORAL at 08:12

## 2022-08-03 ASSESSMENT — PAIN DESCRIPTION - LOCATION
LOCATION: HIP
LOCATION: HIP

## 2022-08-03 ASSESSMENT — PAIN SCALES - GENERAL
PAINLEVEL_OUTOF10: 5
PAINLEVEL_OUTOF10: 5
PAINLEVEL_OUTOF10: 6

## 2022-08-03 ASSESSMENT — PAIN DESCRIPTION - ORIENTATION
ORIENTATION: RIGHT
ORIENTATION: RIGHT

## 2022-08-03 NOTE — CARE COORDINATION
Spoke with the patient about her Dc today and she has chosen [facility] orders were faxed yesterday and  left this AM to see if she can have AL see her. The patient said it would be nice to have him, but really not required. She has a walker at home. Called to schedule a fu up appointment and Dr Delia Smith office said they would call her. Electronically signed by Rosemarie Sesay RN on 8/3/22 at 11:27 AM EDT     [facility] and told them patient DC today 8/3/2022.   Electronically signed by Rosemarie Sesay RN on 8/3/22 at 3:12 PM EDT

## 2022-08-03 NOTE — CONSULTS
disorder)    DDD (degenerative disc disease), lumbar    COPD (chronic obstructive pulmonary disease) (HCC)    Myalgia    Postlaminectomy syndrome, cervical region  Resolved Problems:    * No resolved hospital problems. *      ** Total time spent reviewing medical records, evaluating patient, speaking with RN's and consultants where I was focused exclusively on this patient: 35 minutes. This time is excluding time spent performing procedures or significant events occurring earlier or later in the day requiring my attention and focus. Subjective:   Admit Date: 7/29/2022  PCP: Aaron Wlaters, DO    No acute events overnight. Afebrile. No new complaints. Pt denies chest pain, SOB, N/V, fevers or chills. Patient receiving therapy at time of exam. She was ambulating independently with walker in her room. She states minimal pain from her right hip incision, but states improvement in pain over the last few days. Discharge planned today. VSS. Objective:     Vitals:    08/1950 08/02/22 2105 08/03/22 0459 08/03/22 0633   BP: (!) 125/49   135/77   Pulse: 65   62   Resp: 18 17 16 17   Temp: 98.4 °F (36.9 °C)   98.2 °F (36.8 °C)   TempSrc:    Oral   SpO2: 96%   98%   Weight:       Height:         General appearance: No conversational dyspnea noted. Dentition intact. Answers questions appropriately  Neurological: Alert, awake, and orientated x 4. Motor and sensory grossly intact. No focal deficits. GCS of 15. Lungs: CTAB, no exp wheezes, No rales No retractions; No use of accessory muscles  Heart:  S1, S2 normal, RRR, no MRG appreciated  Abdomen: (+) BS, soft, non-tender; non distended no guarding or rigidity. Extremities:  no cyanosis, no edema bilat lower exts, no calf tenderness bilaterally.  Dry skin noted       Medications:      sodium chloride        traMADol  50 mg Oral 4x Daily AC & HS    Vitamin D  2,000 Units Oral Dinner    cyanocobalamin  1,000 mcg IntraMUSCular Weekly    coenzyme Q10  100 mg Oral Daily    aspirin  81 mg Oral BID    escitalopram  5 mg Oral Daily    gabapentin  300 mg Oral BID    gabapentin  300 mg Oral Nightly    cetirizine  10 mg Oral Daily    rosuvastatin  10 mg Oral Nightly    topiramate  25 mg Oral BID    acetaminophen  650 mg Oral Q6H    sennosides-docusate sodium  1 tablet Oral BID    sodium chloride flush  5-40 mL IntraVENous 2 times per day       LABS Reviewed    IMAGING Reviewed    Ferny Masters  CNP student    Additional work up or/and treatment plan may be added today or then after based on clinical progression. I am managing a portion of pt care. Some medical issues are handled by other specialists and Primary Rehabilitation provider. Additional work up and treatment should be done in out pt setting by pt PCP and other out pt providers. I agree with the assessment and plan completed by NP student  that we came up together in collaboration. I was present for all aspects of care provided and completed an independent physical exam, HPI and ROS.

## 2022-08-03 NOTE — DISCHARGE SUMMARY
Stop  Subjective: The patient complains of severe acute on chronic progressive fatigue and  RIGHT HIP REVISION TOTAL HIP ARTHROPLASTY pain  partially relieved by rest, medications, PT,  OT, ice and OxyIR SLP and rest and exacerbated by recent  surgery-RIGHT HIP REVISION TOTAL HIP ARTHROPLASTY (Right: Hip) with Marek Mendez MD on 7/30/22, -had a good relief from her initial right hip prosthesis approximately a year ago however she has had it go out of position twice on her and therefore required a right hip revision. Postoperatively since this is a more involved surgery she is having increased pain and inability to ambulate. She tells me that she was seen twice in the past year for dislocations relocations the most recent being in the emergency room on 7/20/2022 for right hip dislocation that was successfully reduced in the OR. .      I have been concerned about patients medical complexities and barriers to advancing in rehab goals including  pain. He did well in her rehab program has met her functional goals and is ready for discharge. I reviewed current care and plans for further care with other rehab providers including nursing and case management. According to recent nursing note, \"  VSS. Medicated with scheduled ultram for 5/10 right hip pain, described as being \"sore. \" LBM 7/31. Aquacell in place with scant drainage present. WBAT. Independent in the room. Call light within reach. No distress noted. \".    [address] Course: The patient was admitted to the Rehabilitation Unit to address ADL and mobility deficits-as detailed above and below. The patient was enrolled in acute PT, OT program.  Weekly team meetings were held to assess functional progress toward their goals-and modify the therapy program.  The patient's medical, emotional, psychosocial and functional issues were addressed. The patient progressed in the rehab program and is now ready for discharge home.   Refer to functional assessments summary report for detailed functional status. Refer to the medical problem list below to see the medical issues addressed. The social and DC complexities are detailed in the DC planning section below. Greater than  35 minutes was spent on coordinating patients discharge including follow-up care, medications and patient/family education. Extended time needed because of the potential use of controlled medications are high risk medications and a high risk population individual.  Patient and family were instructed to use lowest effective dose of these medications and slowly titrate off over the next 2 to 4 weeks. They are not to combine opiates with sedatives. I reviewed her [facility] prescription monitoring service data sheets in hopes of eliminating polypharmacy and weaning to the lowest effective dose of pain medications and eliminating the concomitant use of benzodiazepines. I see   no medications of concern. I see   no habits of combining sedatives and narcotics. ROS x10: The patient also complains of severely impaired mobility and activities of daily living. Otherwise no new problems with vision, hearing, nose, mouth, throat, dermal, cardiovascular, GI, , pulmonary, musculoskeletal, psychiatric or neurological. See also Acute Rehab PM&R H&P. Vital signs:  /77   Pulse 62   Temp 98.2 °F (36.8 °C) (Oral)   Resp 17   Ht 5' 3.5\" (1.613 m)   Wt 179 lb 7.3 oz (81.4 kg)   LMP  (LMP Unknown)   SpO2 98%   BMI 31.29 kg/m²   I/O:   PO/Intake:  fair PO intake,  reg diet    Bowel:   continent constipation resolving-LBM 7/31. Bladder: continent       General:  Patient is well developed,   adequately nourished, and    well kempt. HEENT:    Pupils equal, hearing intact to loud voice, external inspection of ear and nose benign. Inspection of lips, tongue and gums benign    Musculoskeletal: No significant change in strength or tone. All joints stable. Inspection and palpation of digits and nails show no clubbing, cyanosis or inflammatory conditions. Neuro/Psychiatric: Affect: flat but pleasant. Alert and oriented to person, place and situation with  no needed cues. No significant change in deep tendon reflexes or sensation  Lungs:  Diminished, CTA-B. Respiration effort is   normal at rest.     Heart:   S1 = S2,   RRR. Abdomen:  Soft, non-tender, no enlargement of liver or spleen.   Extremities:    lower extremity edema  -RIGHT HIP REVISION TOTAL HIP ARTHROPLASTY  Skin:   Intact to general survey, RIGHT HIP REVISION TOTAL HIP ARTHROPLASTY    Rehabilitation:  Physical Therapy:   Bed mobility:  Bed mobility  Rolling to Left: Modified independent (Instructed pt to place pillow between knees before rolling.) (08/01/22 1045)  Rolling to Right: Unable to assess (d/t recent hip surgery) (08/01/22 1045)  Supine to Sit: Modified independent (08/01/22 1636)  Sit to Supine: Modified independent (08/01/22 1636)  Scooting: Stand by assistance (07/30/22 1138)  Bed Mobility Comments: pillow between legs to maintain precautions (07/30/22 1138)  Bed Mobility Training  Bed Mobility Training: Yes (08/02/22 1325)  Overall Level of Assistance: Independent (08/02/22 1325)  Supine to Sit: Independent (08/02/22 1325)  Sit to Supine: Independent (08/02/22 1325)  Scooting: Independent (08/02/22 1325)  Transfers:  Transfers  Sit to Stand: Modified independent (08/02/22 1029)  Stand to sit: Modified independent (08/02/22 1029)  Bed to Chair: Modified independent (08/02/22 1029)  Stand Pivot Transfers: Stand by assistance (with 2ww) (07/30/22 1138)  Car Transfer: Modified independent (08/02/22 1029)  Comment: vc's for maintaining hip precautions (07/30/22 1138)  Transfer Training  Transfer Training: Yes (08/02/22 1325)  Overall Level of Assistance: Modified independent (08/02/22 1325)  Interventions: Verbal cues (08/02/22 1325)  Sit to Stand: Modified independent (08/02/22 1325)  Stand to Sit: Modified independent (08/02/22 1325)  Car Transfer: Modified independent (08/02/22 1325)  Gait:   Ambulation  WB Status: R LE WBAT; posterior hip precautions (08/02/22 1029)  Ambulation  Surface: level tile;uneven;carpet (08/02/22 1029)  Device: Rolling Walker (08/02/22 1029)  Assistance: Modified Independent (08/02/22 1029)  Quality of Gait: mild antalgic pattern (08/02/22 1029)  Gait Deviations: Slow Francoise;Decreased step length (07/31/22 1126)  Distance: 350ft (08/02/22 1029)  Comments: VCs to keep hands on [facility] while ambulating.   TUG 15.21 with [facility] (08/02/22 1029)  More Ambulation?: Yes (07/31/22 1126)  Ambulation 2  Surface - 2: level tile;carpet (07/31/22 1126)  Device 2: Single point cane (07/31/22 1126)  Assistance 2: Stand by assistance (07/31/22 1126)  Quality of Gait 2: Step through patter w/ good HS, reduce Rt step length likely D/T limited Rt hip extension mobility, no LOB or unsteadiness observed (07/31/22 1126)  Distance: 75'x2 (07/31/22 1126)  Gait Training: Yes (08/02/22 1325)  Overall Level of Assistance: Modified independent (08/02/22 1325)  Distance (ft): 200 Feet (08/02/22 1325)  Assistive Device: Walker, rolling (08/02/22 1325)  Interventions: Verbal cues (08/02/22 1325)  Base of Support: Narrowed (08/02/22 1325)  Speed/Francoise: Fluctuations (08/02/22 1325)  Step Length: Right shortened (08/02/22 1325)  Gait Abnormalities: Circumduction (08/02/22 1325)  Rail Use: Right (08/02/22 1325)  Right Side Weight Bearing: As tolerated (08/02/22 1325)  Left Side Weight Bearing: As tolerated (08/02/22 1325)  Uneven Terrain - Level of Assistance: Modified independent (08/02/22 1325)  Stairs:  Stairs/Curb  Stairs?: Yes (08/02/22 1031)  Stairs  # Steps : 12 (08/02/22 1031)  Stairs Height: 6\" (08/02/22 1031)  Rails: Left ascending (08/02/22 1031)  Device: Single pt cane (08/02/22 1031)  Assistance: Modified independent  (08/02/22 1031)  Comment: Completed with 1 rail ascending on L to simulate home enterance and ascending R rail and st cane in L to simulate stairs 2nd floor bed and bath. Instructed pt on sequencing with st cane. (08/01/22 1156)  Stairs - Level of Assistance: Supervision (08/02/22 1325)  Number of Stairs Trained: 12 (08/02/22 1325)  W/C mobility:  Wheelchair Management  Wheelchair Management: No (08/02/22 1325)    Occupational Therapy:   Hand Dominance: Right  ADL  Feeding: Dentures; Modified independent  (upper dentures only) (07/30/22 0806)  Grooming: Stand by assistance (07/30/22 0806)  Grooming Skilled Clinical Factors: VC's to follow precautions (07/30/22 0806)  UE Bathing: Supervision (07/30/22 0806)  LE Bathing: Minimal assistance (07/30/22 0806)  LE Bathing Skilled Clinical Factors: pt raising L leg to wash feet, assist with R foot, VC's to follow precautions (pt trying to flex leg while seated to reach foot), (07/30/22 0806)  UE Dressing: Setup;Supervision (07/30/22 0806)  LE Dressing: Moderate assistance (07/30/22 0806)  LE Dressing Skilled Clinical Factors: to don socks B feet, pt able to doff L sock,assist to thread LE into LB clothing (07/30/22 0806)  Toileting: Unable to assess(comment) (07/30/22 0806)  Toileting Skilled Clinical Factors: pt declined need (07/30/22 0806)  Toilet Transfers  Toilet Transfer: Unable to assess (07/30/22 1219)     Shower Transfers  Shower - Transfer From: [place] (07/30/22 1219)  Shower - Transfer Type: To (07/30/22 1219)  Shower - Transfer To: Shower seat with back (07/30/22 1219)  Shower - Technique: Ambulating (07/30/22 1219)  Shower Transfers: Stand by assistance (07/30/22 1219)  Shower Transfers Comments: VC's to follow precautions (07/30/22 1219)    Speech Therapy:            Diet/Swallow:                      Lab/X-ray studies reviewed, analyzed and discussed with patient and staff:   No results found for this or any previous visit (from the past 24 hour(s)).     XR HIP RIGHT   7/28/2022   Unremarkable alignment of the right hip prosthesis, no evidence of lucency surrounding the prosthesis. No evidence of cortical irregularity or lucency to suggest a fracture, no evidence of lytic or sclerotic bone lesion is seen. Mild degenerative bone changes are seen. The overlying soft tissues are unremarkable. Unremarkable alignment of the right hip prosthesis, no evidence of complications. XR HIP RIGHT 7/20/2022    Single frontal view of the right hip demonstrates reduction of the previously noted dislocation of the right hip prosthesis. No other significant new nor acute osseous lesion has developed. REDUCTION OF PREVIOUSLY NOTED DISLOCATION OF RIGHT HIP PROSTHESIS. XR HIP RIGHT  7/20/2022  RIGHT HIP ARTHROPLASTY. SUPERIOR DISLOCATION OF THE FEMORAL HEAD COMPONENT RELATIVE TO THE ACETABULAR CUP. Previous extensive, complex labs, notes and diagnostics reviewed and analyzed. ALLERGIES:    Allergies as of 07/29/2022 - Fully Reviewed 07/29/2022   Allergen Reaction Noted    Doxycycline  02/25/2021    Ibandronic acid  07/27/2022    Keflex [cephalexin] Nausea Only 07/18/2017    Lidocaine  02/25/2021    Nickel  08/21/2014    Prednisone  07/21/2020    Shellfish-derived products  02/25/2021    Simvastatin  04/18/2017    Tetracyclines & related  03/09/2021    Ultram [tramadol hcl]  09/20/2011    Augmentin [amoxicillin-pot clavulanate] Nausea And Vomiting 02/25/2021      (please also verify by checking MAR)       I reviewed her [facility] prescription monitoring service data sheets in hopes of eliminating polypharmacy and weaning to the lowest effective dose of pain medications and eliminating the concomitant use of benzodiazepines. I see no medications of concern. I see no habits of combining sedatives and narcotics. Complex Physical Medicine & Rehab Issues addressed during rehab stay:   Severe abnormality of gait and mobility and impaired self-care and ADL's secondary to [address] .   Functional and medical status have improved greatly status postacute rehab at [facility].  Bowel and Bladder dysfunction  , Neurogenic bowel and bladder:  frequent toileting, ambulate to bathroom with assistance, check post void residuals. Check for C.difficile x1 if >2 loose stools in 24 hours, continue bowel & bladder program.  Monitor bowel and bladder function. Lactinex 2 PO every AC. MOM prn, Brown Bomb prn, Glycerin suppository prn, enema prn.    therapy and nursing  co-treat and problem solve re continence. Severe RIGHT HIP REVISION TOTAL HIP ARTHROPLASTY pain as well as generalized OA pain: reassess pain every shift and prior to and after each therapy session, give Scheduled Ultram with Tylenol and prn Tylenol and consider scheduled Tylenol, modalities prn in therapy, masage, Lidoderm, K-pad prn.   scheduled AM pain meds. Skin healing  RIGHT HIP REVISION TOTAL HIP ARTHROPLASTY incision and breakdown risk:    pressure relief program.  Daily skin exams and reports from nursing. Fatigue due to nutritional and hydration deficiency: Add and titrate vitamin B12 vitamin D and CoQ10 continue to monitor I&Os, calorie counts prn, dietary consult prn. Add healthy snack at night. Acute episodic insomnia with situational adjustment disorder:  prn Ambien, monitor for day time sedation. Falls risk elevated:  patient to use call light to get nursing assistance to get up, bed and chair alarm. Elevated DVT risk: progressive activities in PT, continue prophylaxis  LUIS hose, elevation and  ASA . Complex discharge planning: Patient is doing much better functionally and okay to transition to independent in the room. Date of discharge August 3, 2022 to home with friends who will be staying temporarily and [facility] home [facility] care.   Patient is status post weekly team meeting   Monday to re-assess progress towards goals, discuss and address social, psychological and medical comorbidities and to address difficulties they may be having progressing in therapy. Patient and family education is in progress. The patient is to follow-up with their family physician after discharge. Complex Active General Medical Issues that complicated care:      Status post total replacement of right hip-strict hip precautions as this is her first revision and hit her hip popped out twice over the past year.-Follow-up with Dr. Lonny Cabral as an outpatient     PADMINI (generalized anxiety disorder), Depressive disorder-emotional support provided daily, vitamin B12, encourage participation in rehabilitation support group and recreational therapy, adjust/add medications (Lexapro, Ativan, add vitamin D)   Cervical spondylosis without myelopathy Cervical radiculitis, DDD (degenerative disc disease), lumbar,  Postlaminectomy syndrome, cervical region, Myalgia-lowest effective dose of pain medication, treat depression, titrate Neurontin and Topamax  HTN, CAD (coronary artery disease)-Acute rehab to monitor heart rate and rhythm with the option of telemetry and the effects of chronotropic medication with respect to increasing physical activity and exercise in PT, OT, ADLs with medication titration to lowest effective dosing. Continue blood signs every shift focusing on heart rate, rhythm and blood pressure checks with orthostatic checks-monitoring the effect of exercise, therapy and posture. Consult hospitalist for backup medical and adjust/add medications (Crestor). Monitor heart rate and blood pressure as well as medications effects on vital signs before during and after therapy with especial focus on preventing orthostasis and falls risk. COPD (chronic obstructive pulmonary disease)-Acute rehab for endurance traing with Pulse Ox to monitoring oxygen saturation and heart rate with O2 titration to lowest effective dose.  Pulse oximeter checks to shift and at HS to dose and titrate oxygen and aerosol treatments monitor for nocturnal hypoxemia, monitor vital signs, oxygen prn. Focus on energy conservation. Review and simplify inpatient and outpatient medications and dosing times. Transition to self medication program if able.         Electronically signed by Stefani Nair DO on 8/1/22 at 7:57 AM JULIETAT       Shaan Palomo D.O., PM&R     Attending    [address]

## 2022-08-03 NOTE — DISCHARGE INSTR - COC
Continuity of Care Form    Patient Name: Jerman Cedeno   :  1950  MRN:  [id number]    6 [facility] date:  2022  Discharge date:  8/3/2022    Code Status Order: Full Code   Advance Directives:     Admitting Physician:  Lynette Barriga DO  PCP: Fidencio Emmanuel DO    Discharging Nurse: [facility] Unit/Room#: E858/A972-56  Discharging Unit Phone Number: [phone]    Emergency Contact:   Extended Emergency Contact Information  Primary Emergency Contact: Dmitry Clarke [address] Phone: [phone]  Work Phone: [phone]  Mobile Phone: [phone]  Relation: Child  Secondary Emergency Contact: Leanne Long [address] Phone: [phone]  Work Phone: [phone]  Mobile Phone: [phone]  Relation: Brother/Sister    Past Surgical History:  Past Surgical History:   Procedure Laterality Date    APPENDECTOMY      CARDIAC CATHETERIZATION  2013     [address]    fusion    CHOLECYSTECTOMY      COLONOSCOPY  2009    COSMETIC SURGERY  2010    on eye    JOINT REPLACEMENT Right 2021    hip    [place] TOTAL KNEE ARTHROPLASTY Left 2017    LEFT TOTAL KNEE ARTHROPLASTY SUPINE BELEN PSI SPINAL (OUTSIDE PAT DR. OLIVA) performed by Dolly Manzano MD at [place] [address] Right 2022    RIGHT HIP REVISION TOTAL HIP ARTHROPLASTY performed by Leonila De La Garza MD at [address] [place] [address]       Immunization History:   Immunization History   Administered Date(s) Administered    Influenza Virus Vaccine 10/14/2015    Influenza Whole 2013    Influenza, High Dose (Fluzone 65 yrs and older) 11/15/2016, 10/18/2017, 2018    Pneumococcal Conjugate 13-valent ([id number]) 11/15/2016    Pneumococcal Polysaccharide ([id number]) 2014    Td, unspecified formulation 2013       Active Problems:  Patient Active Problem List   Diagnosis Code    Osteoarthritis M19.90    Headache R51.9    Hernia, hiatal K44.9    Anxiety F41.9    Family history of heart disease Z82.49    Hx of cardiac cath x 2, normal, last 9/13 Z98.890    Fatigue R53.83    Allergic rhinitis, Dr Chrissie Lawrence J30.9    Dyslipidemia E78.5    Cat bite involving extremity [id number]    Cellulitis, improving L03.90    CAD (coronary artery disease) I25.10    S/P cervical spinal fusion, 5-6 Z98.1    CTS (carpal tunnel syndrome), Ortho G56.00    Cervical radiculitis, surgery pending M54.12    History of tobacco use Z87.891    PADMINI (generalized anxiety disorder) F41.1    DDD (degenerative disc disease), lumbar M51.36    Cervical spine disease M48.9    COPD (chronic obstructive pulmonary disease) ([facility]) J44.9    Migraine G43.909    Myalgia M79.10    Osteoarthritis of left knee M17.12    Other symptoms referable to back M53.80    Postlaminectomy syndrome, cervical region M96.1    Right leg numbness R20.0    Status post total replacement of right hip Z96.641    Impaired mobility and activities of daily living dt RIGHT HIP REVISION TOTAL HIP ARTHROPLASTY Z74.09, Z78.9    Chronic mesenteric ischemia (HCC) K55.1    Cervical spondylosis without myelopathy M47.812    Bilateral carotid artery stenosis I65.23    Acquired hypothyroidism E03.9    Depressive disorder F32.9    Hyperglycemia R73.9    Lumbar spondylosis M47.816    Impaired mobility and ADLs Z74.09, Z78.9       Isolation/Infection:   Isolation            No Isolation          Patient Infection Status       None to display            Nurse Assessment:  Last Vital Signs: BP (!) 125/49   Pulse 65   Temp 98.4 °F (36.9 °C)   Resp 17   Ht 5' 3.5\" (1.613 m)   Wt 179 lb 7.3 oz (81.4 kg)   LMP  (LMP Unknown)   SpO2 96%   BMI 31.29 kg/m²     Last documented pain score (0-10 scale): Pain Level: 0  Last Weight:   Wt Readings from Last 1 Encounters:   07/30/22 179 lb 7.3 oz (81.4 kg)     Mental Status:  oriented and alert    IV Access:  - None    Nursing Mobility/ADLs:  Walking Independent  Transfer  Independent  Bathing  Independent  Dressing  Independent  Toileting  Independent  Feeding  Independent  Med [address]  Med Delivery   whole    Wound Care Documentation and Therapy:  Incision 07/28/22 Thigh Right;Lateral;Outer (Active)   Dressing Status Clean;Dry; Intact; Other (Comment) 08/02/22 2104   Dressing Change Due 08/04/22 08/02/22 2104   Incision Cleansed Not Cleansed; Other (Comment) 08/02/22 2104   Dressing/Treatment Other (comment) 08/02/22 2104   Closure Other (Comment) 08/02/22 2104   Margins Other (Comment) 08/02/22 2104   Incision Assessment Other (Comment) 08/02/22 2104   Drainage Amount None 08/02/22 2104   Drainage Description Sanguinous 08/01/22 0845   Odor None 08/02/22 2104   Jessica-incision Assessment Warm; Intact;Edematous 08/02/22 2104   Number of days: 5        Elimination:  Continence: Bowel: Yes  Bladder: Yes  Urinary Catheter: None   Colostomy/Ileostomy/Ileal Conduit: No       Date of Last BM: 6/2/2022  No intake or output data in the 24 hours ending 08/03/22 0116  No intake/output data recorded. Safety Concerns:     None    Impairments/Disabilities:      None    Nutrition Therapy:  Current Nutrition Therapy:   - Oral Diet:  General    Routes of Feeding: Oral  Liquids: Thin Liquids  Daily Fluid Restriction: no  Last Modified Barium Swallow with Video (Video Swallowing Test): not done    Treatments at the Time of Hospital Discharge:   Respiratory Treatments: none  Oxygen Therapy:  is not on home oxygen therapy.   Ventilator:    - No ventilator support    Rehab Therapies: Physical Therapy and Occupational Therapy  Weight Bearing Status/Restrictions: No weight bearing restrictions  Other Medical Equipment (for information only, NOT a DME order):  walker  Other Treatments:       Patient's personal belongings (please select all that are sent with patient):  Hearing Aides bilateral, Dentures upper and lower    RN SIGNATURE:  Electronically signed by Xavier Acosta Latosha Damon on 8/3/22 at 12:44 PM EDT    CASE MANAGEMENT/SOCIAL WORK SECTION    Inpatient Status Date: 7/29/2022    Readmission Risk Assessment Score:  Readmission Risk              Risk of Unplanned Readmission:  [id number]           Discharging to Facility/ Agency   Name: Nayeli Forrest Edi Reyes, [facility] [address] [phone]  Address:  [phone]  Fax:    Dialysis Facility (if applicable)   Name:  Address:  Dialysis Schedule:  Phone:  Fax:    / signature: Electronically signed by Kati Chawla RN on 8/3/22 at 10:16 AM EDT    PHYSICIAN SECTION    Prognosis: Good    Condition at Discharge: Stable    Rehab Potential (if transferring to Rehab): Good    Recommended Labs or Other Treatments After Discharge:      Physician Certification: I certify the above information and transfer of Tres Boss  is necessary for the continuing treatment of the diagnosis listed and that she requires Home Care for less 30 days.      Update Admission H&P: No change in H&P    PHYSICIAN SIGNATURE: Joslyn Brewre DO   Electronically signed by Kati Chawla RN on 8/3/22 at 10:16 AM EDT

## 2022-08-03 NOTE — PROGRESS NOTES
Physical Therapy Rehab Treatment Note  [facility]       NAME: Loy Ring  : 1950 (80 y.o.)  MRN: [id number]  CODE STATUS: Full Code    Date of Service: 8/3/2022       Restrictions:  Restrictions/Precautions: Weight Bearing, Fall Risk  Lower Extremity Weight Bearing Restrictions  Right Lower Extremity Weight Bearing: Weight Bearing As Tolerated  Left Lower Extremity Weight Bearing: Weight Bearing As Tolerated  Position Activity Restriction  Hip Precautions: Posterior hip precautions       SUBJECTIVE:   Subjective: Pt states she is ready to go home. Pt states she can feel the differance of leg length when she walks. Pain  Pain: 4/10 R hip sore Pt declined intervention. OBJECTIVE:         Bed mobility  Rolling to Left: Modified independent  Rolling to Right: Unable to assess  Supine to Sit: Modified independent  Sit to Supine: Modified independent    Transfers  Sit to Stand: Modified independent  Stand to sit: Modified independent  Bed to Chair: Modified independent  Car Transfer: Modified independent    Ambulation  WB Status: R LE WBAT; posterior hip precautions  Ambulation  Surface: level tile;uneven;carpet  Device: Rolling Walker  Assistance: Modified Independent  Quality of Gait: mild antalgic pattern, flexed R knee, slight vaulting when advancing L LE  Distance: 300ft    Stairs/Curb  Stairs?: Yes  Stairs  # Steps : 16  Stairs Height: 6\"  Rails: Left ascending  Curbs: 6\"  Device: Rolling walker  Assistance: Modified independent         PT Exercises  Exercise Treatment: supine heel slides, hip abd, SAQs x10 ea        Education Provided: Precautions  Education  Education Given To: Patient  Education Provided: Precautions  Education Provided Comments: Reviewed ergonomics and body mechanics while following hip prec with functional tasks ie. cleaning cat litter box. Showed pt long handled litter .         ASSESSMENT/PROGRESS TOWARDS GOALS: goals met Goals:  Long Term Goals  Long term goal 1: Pt will demonstrate bed mobiilty with indep following posterior hip precautions-met  Long term goal 2: Pt will demonstrate functional transfers including car transfers with indep-met  Long term goal 3: Pt will amb 150ft with LRAD and indep-met  Long term goal 4: Pt will negotiate 16 steps with handrail and indep-met  Long term goal 5: Pt will demonstrate TUG <18 seconds to decrease fall risk-met  Patient Goals   Patient goals : to go home and be indep    PLAN OF CARE/Safety:   Plan Comment: Pt scheduled to D/C home today.       Therapy Time:   Individual   Time In 1300   Time Out 1400   Minutes 60     Minutes:  Transfer/Bed mobility training:10  Gait trainin  Neuro re education:0  Therapeutic ex:20      Marcus Huynh PTA, 22 at 1:47 PM

## 2022-08-03 NOTE — PROGRESS NOTES
Physical Therapy Rehab Treatment Note  Facility/Department: [facility]  Room: R2/R255-01       NAME: Evelin Corbin  : 1950 (80 y.o.)  MRN: [id number]  CODE STATUS: Full Code    Date of Service: 8/3/2022       Restrictions:  Restrictions/Precautions: Weight Bearing, Fall Risk  Lower Extremity Weight Bearing Restrictions  Right Lower Extremity Weight Bearing: Weight Bearing As Tolerated  Left Lower Extremity Weight Bearing: Weight Bearing As Tolerated  Position Activity Restriction  Hip Precautions: Posterior hip precautions       SUBJECTIVE:   Subjective: Pt states she is ready to go home. Pt states she can feel the differance of leg length when she walks. Pain  Pain: 4/10 R hip sore Pt declined intervention. OBJECTIVE:         Bed mobility  Rolling to Left: Modified independent  Rolling to Right: Unable to assess  Supine to Sit: Modified independent  Sit to Supine: Modified independent    Transfers  Sit to Stand: Modified independent  Stand to sit: Modified independent  Bed to Chair: Modified independent  Car Transfer: Modified independent    Ambulation  WB Status: R LE WBAT; posterior hip precautions  Ambulation  Surface: level tile;uneven;carpet  Device: Rolling Walker  Assistance: Modified Independent  Quality of Gait: mild antalgic pattern, flexed R knee, slight vaulting when advancing L LE  Distance: 300ft    Stairs/Curb  Stairs?: Yes  Stairs  # Steps : 16  Stairs Height: 6\"  Rails: Left ascending  Device: Single pt cane  Assistance: Modified independent         PT Exercises  Exercise Treatment: seated LAQs x20, hip add with ball x20, hip abd RTB x20, supine heel slides x10, supine hip abd x10, supine bridges x10  Reviewed and issued for HEP. Issued RTB.   Dynamic Standing Balance Exercises: Utilized reacher to  bean bags from floor while standing with [employer].  Standing Open/Closed Kinetic Chain Exercises: sink ex x10 ea         Education Provided: Home Exercise Program  Education  Education Given To: Patient  Education Provided: Home Exercise Program  Education Provided Comments: Reviewed and issued HEP for seated and supine therex. Pt demo'd independently after instruction. Issued RTB for seated hip abd. Reviewed previously issued sink ex for HEP. Educated pt on progressing of reps to increase strength. Pt reports she feels comfortable doing these ex at home. Educated pt to ice hip PRN  20min at a time. ASSESSMENT/PROGRESS TOWARDS GOALS: Goals met       Goals:  Long Term Goals  Long term goal 1: Pt will demonstrate bed mobiilty with indep following posterior hip precautions-met  Long term goal 2: Pt will demonstrate functional transfers including car transfers with indep-met  Long term goal 3: Pt will amb 150ft with LRAD and indep-met  Long term goal 4: Pt will negotiate 16 steps with handrail and indep-met  Long term goal 5: Pt will demonstrate TUG <18 seconds to decrease fall risk-met  Patient Goals   Patient goals : to go home and be indep    PLAN OF CARE/Safety:   Plan Comment: Pt scheduled to D/C home today.       Therapy Time:   Individual   Time In 7989   Time Out 1050   Minutes 60     Minutes:  Transfer/Bed mobility trainin  Gait trainin  Neuro re education:0  Therapeutic ex:30      Harish Walker PTA, 22 at 10:47 AM

## 2022-08-03 NOTE — DISCHARGE INSTR - DIET

## 2022-08-03 NOTE — PROGRESS NOTES
Assessment completed. VSS. Medicated with scheduled ultram for 5/10 right hip pain, described as being \"sore. \" LBM 7/31. Aquacell in place with scant drainage present. WBAT. Independent in the room. Call light within reach. No distress noted.   Electronically signed by Gregoria Maravilla RN on 8/3/2022 at 12:02 AM

## 2022-08-03 NOTE — PROGRESS NOTES
OCCUPATIONAL THERAPY  INPATIENT REHAB TREATMENT NOTE  [facility]      NAME: Aspen Mai  : 1950 (80 y.o.)  MRN: [id number]  CODE STATUS: Full Code  Room: R255/R255-01    Date of Service: 8/3/2022    Referring Physician: Dr. Ezra Pearson Diagnosis: Impaired mobility and ADL's due to [address]    Restrictions  Restrictions/Precautions  Restrictions/Precautions: Weight Bearing, Fall Risk   Lower Extremity Weight Bearing Restrictions  Right Lower Extremity Weight Bearing: Weight Bearing As Tolerated  Left Lower Extremity Weight Bearing: Weight Bearing As Tolerated     Position Activity Restriction  Hip Precautions: Posterior hip precautions    Patient's date of birth confirmed: Yes    SAFETY:  Safety Devices  Type of devices: All fall risk precautions in place    SUBJECTIVE:  Subjective: I feel good about going home  Pain: 4/10 pain    OBJECTIVE:     While seated at edge of mat, challenged strength, activity tolerance, ROM, and flexibility for improved ADL performance. Issued pt BUE HEP handout. Pt able to read through exercises and complete with fair technique. Pt mainly SUP. Recommend follow up with home therapy to ensure technique     Challenged pt through usage of 2# to complete BUE exercises. 2 sets x 10 reps completed. Exercises focused on all UE joints and planes of motion including scapular protraction/retraction, shoulder flexion/extension/rotation/horizontal abduction, elbow flexion/extension, supination/pronation, and wrist/digit flexion/extension. Medication management: Modified independent    Health Management: Patient completed medication management simulation utilizing 7 provided medication bottles with written instruction to place a total of 74 beads into the provided weekly medication organizer. Patient with no difficulty opening medication bottles. Patient with no difficulty opening organizer boxes.     Patient placed a total of 74 beads into organizer with 0 verbal cues and a total of 74 being correct. Patient with no difficulty manipulating beads. Patient able to sort beads back into correct bottles with 0 errors. Patient able to securely close 7/7 caps on medication bottles. While standing, completed fine motor task with focus on balance, coordination, activity tolerance, and strength in order to improve general function. Challenged pt through usage of small dowels. Pt required to manipulate dowels, through pinching/grasping, reaching and placing according to instruction into vertical peg tree. Repetitive bilateral UE reaches completed to forward/vertical planes and at times required >90 degrees of shoulder flexion. Good ability to complete task. Pt aware of precautions while completing activity   Transfers: independent  Standing tolerance: 5+ mins  Standing balance: G     Education:  Education  Education Given To: Patient  Education Provided: Safety;Equipment;Home Exercise Program;Transfer Training;DME/Home Modifications  Education Method: Demonstration;Verbal;Teach Back  Education Outcome: Verbalized understanding;Demonstrated understanding    Educated pt regarding options for her tub as she reported a typical tub bench will not work. Educated her regarding over the tub options that are more like an actual bench seat. Pt reported she was not interested. ASSESSMENT:   Pt to d/c later today- feels confident about returning home      PLAN OF CARE:  Strengthening, Balance training, Functional mobility training, Endurance training, Safety education & training, Self-Care / ADL  Continue OT per POC    Patient goals : \"I want to go tomorrow morning, not wait until the afternoon. \"  Long Term Goal 1: Pt will be IND wtih precautions during ADL's. Long Term Goal 2: Pt will be Mod IND with LB dressing.       Therapy Time:   Individual Group Co-Treat   Time In 1100       Time Out 1200         Minutes 60         Patient participated

## 2022-08-03 NOTE — PLAN OF CARE
Problem: Discharge Planning  Goal: Discharge to home or other facility with appropriate resources  8/2/2022 2324 by Fermin Bailey RN  Outcome: Progressing     Problem: Safety - Adult  Goal: Free from fall injury  8/2/2022 2324 by Fermin Bailey RN  Outcome: Progressing     Problem: ABCDS Injury Assessment  Goal: Absence of physical injury  8/2/2022 2324 by Fermni Bailey RN  Outcome: Progressing     Problem: Pain  Goal: Verbalizes/displays adequate comfort level or baseline comfort level  8/2/2022 2324 by Fermin Bailey RN  Outcome: Progressing

## 2022-08-04 DIAGNOSIS — Z96.649 AFTERCARE FOLLOWING HIP JOINT REPLACEMENT SURGERY, UNSPECIFIED LATERALITY: Primary | ICD-10-CM

## 2022-08-04 DIAGNOSIS — G89.18 POST-OP PAIN: ICD-10-CM

## 2022-08-04 DIAGNOSIS — Z47.1 AFTERCARE FOLLOWING HIP JOINT REPLACEMENT SURGERY, UNSPECIFIED LATERALITY: Primary | ICD-10-CM

## 2022-08-04 RX ORDER — TRAMADOL HYDROCHLORIDE 50 MG/1
50 TABLET ORAL EVERY 4 HOURS PRN
Qty: 40 TABLET | Refills: 0 | Status: SHIPPED | OUTPATIENT
Start: 2022-08-04 | End: 2022-08-11

## 2022-08-04 NOTE — PROGRESS NOTES
Note patient DC from rehab sp hip revision--told us she had meds at home--she did not have them at home--ok for post op meds until she v=can see ortho

## 2022-08-10 NOTE — PROGRESS NOTES
Facility/Department: [facility]  Physical Therapy Acute Rehab Discharge Summary  [facility]    NAME: Cecil Nunez  : 1950  MRN: [id number]    Admission Date: 2022  6:28 PM  Discharge Date: 8/3/22    Rehab Diagnosis(es): Impaired mobility and ADLs d/t R hip revision total hip arthroplasty-rehab admit 22  Patient Active Problem List    Diagnosis Date Noted    Impaired mobility and ADLs 2022    Impaired mobility and activities of daily living [place] 2022    Bilateral carotid artery stenosis 2022    Acquired hypothyroidism 2022    Hyperglycemia 2022    Status post total replacement of right hip 2022    Depressive disorder 2022    Lumbar spondylosis 2021    Chronic mesenteric ischemia ([facility] [address] 07/10/2020    Cervical spondylosis without myelopathy 2013    Cervical spine disease 2018    Migraine 2018    Right leg numbness 2018    Osteoarthritis of left knee 2017    COPD (chronic obstructive pulmonary disease) ([facility] [address] 2017    DDD (degenerative disc disease), lumbar 2016    PADMINI (generalized anxiety disorder) 2015    History of tobacco use 2014    S/P cervical spinal fusion, 5-6 2014    CTS (carpal tunnel syndrome), Ortho 2014    Cervical radiculitis, surgery pending 2014    CAD (coronary artery disease) 2014    Cat bite involving extremity 2013    Cellulitis, improving 2013    Allergic rhinitis, Dr Yanni Jacobs 10/28/2013    Dyslipidemia 10/28/2013    Hx of cardiac cath x 2, normal, last 9/13 10/07/2013    Fatigue 10/07/2013    Postlaminectomy syndrome, cervical region 2013    Family history of heart disease 2013    Osteoarthritis     Headache     Hernia, hiatal     Anxiety     Myalgia 10/16/2009    Other symptoms referable to back 2007       Past Medical History:   Diagnosis Date    Acquired hypothyroidism 2022 Allergic rhinitis, Dr Zaire Hopper 10/28/2013    Anxiety     Back pain     Bilateral carotid artery stenosis 7/29/2022    CAD (coronary artery disease)     Cat bite involving extremity 11/5/2013    Cat bite involving extremity 11/5/2013    Cellulitis 11/5/2013    Cellulitis, improving 11/5/2013    Cervical radiculitis 1/13/2014    Cervical radiculitis, surgery pending 1/13/2014    COPD (chronic obstructive pulmonary disease) ([facility] [address] 1/3/2017    CTS (carpal tunnel syndrome), Ortho 1/13/2014    Depressive disorder 3/25/2022    Dyslipidemia 10/28/2013    Fatigue 10/7/2013    Headache(784.0)     H/O MIGRANES    Hernia, hiatal     H/O     Hx of cardiac cath x 2, normal, last 9/13 10/7/2013    Osteoarthritis     S/P cervical spinal fusion, 5-6 1/13/2014    Seizure disorder ([address]     Seizure disorder ([address]     Sinusitis 10/28/2013    Tobacco abuse, quit 9/3/13 10/7/2013    Tobacco abuse, quit 9/3/13      Past Surgical History:   Procedure Laterality Date    APPENDECTOMY      CARDIAC CATHETERIZATION  09/05/2013     [address]    fusion    CHOLECYSTECTOMY      COLONOSCOPY  2009    COSMETIC SURGERY  2010    on eye    JOINT REPLACEMENT Right 08/09/2021    hip    [place] TOTAL KNEE ARTHROPLASTY Left 12/05/2017    LEFT TOTAL KNEE ARTHROPLASTY SUPINE BELEN PSI SPINAL (OUTSIDE PAT DR. OLIVA) performed by Dolly Manzano MD at [address] Right 7/28/2022    RIGHT HIP REVISION TOTAL HIP ARTHROPLASTY performed by Leonila De La Garza MD at [address] [place] [address]       Indications for Skilled Intervention: Decreased functional mobility , Decreased strength, Decreased balance, Decreased endurance    GOALS:  Long Term Goals  Long term goal 1: Pt will demonstrate bed mobiilty with indep following posterior hip precautions-met  Long term goal 2: Pt will demonstrate functional transfers including car transfers with indep-met  Long term goal 3: Pt will amb 150ft with LRAD and indep-met  Long term goal 4: Pt will negotiate 16 steps with handrail and indep-met  Long term goal 5: Pt will demonstrate TUG <18 seconds to decrease fall risk-met    Summary of POC: Throughout rehab stay, pt received skilled physical therapy treatment 2.0 hours daily for 5days. Skilled Services Provided: Strengthening, Balance training, Functional mobility training, Transfer training, Gait training, Endurance training, Stair training, Neuromuscular re-education, Home exercise program, Safety education & training, Equipment evaluation, education, & procurement, Modalities, Positioning, Patient/Caregiver education & training, Cognitive/Perceptual training, Therapeutic activities (Please refer to daily notes for all treatment details)    ASSESSMENT: Pt achieving all goals as outlined above. States readiness to return home. Appropriate for DC from acute rehab PT program.    Discharge Plan: DC home at indep level of function. Rec f/u OP PT.      Joe Gomez, PT, 08/10/22 at 8:29 AM

## 2022-10-31 ENCOUNTER — HOSPITAL ENCOUNTER (OUTPATIENT)
Dept: PHYSICAL THERAPY | Age: 72
Setting detail: THERAPIES SERIES
Discharge: HOME | End: 2022-10-31
Payer: MEDICARE

## 2022-10-31 PROCEDURE — 97162 PT EVAL MOD COMPLEX 30 MIN: CPT

## 2022-10-31 ASSESSMENT — PAIN SCALES - GENERAL: PAINLEVEL_OUTOF10: 4

## 2022-10-31 ASSESSMENT — PAIN DESCRIPTION - LOCATION: LOCATION: HIP

## 2022-10-31 ASSESSMENT — PAIN DESCRIPTION - ORIENTATION: ORIENTATION: RIGHT

## 2022-10-31 NOTE — PROGRESS NOTES
[address] [facility]  PHYSICAL THERAPY EVALUATION    Physical Therapy: Initial Evaluation    Patient: Deb Bolden (21 y.o.     female)   Examination Date: 10/31/2022   :  1950 ;    Savita Carey MRN: [id number]  CSN: [id number]   Insurance: Payor: MEDICARE / Plan: MEDICARE PART A AND B / Product Type: *No Product type* /   Insurance ID: [id number] - (Medicare) Secondary Insurance (if applicable): [employer]   Referring Physician: Marina Carpenter MD       Visits to Date/Visits Approved: 1 /      No Show/Cancelled Appts: 0 / 0     Medical Diagnosis: Presence of unspecified artificial hip joint [Z96.649]        Treatment Diagnosis: Rt hip pain     PERTINENT MEDICAL HISTORY           Medical History: Chart Reviewed: Yes   Past Medical History:   Diagnosis Date    Acquired hypothyroidism 2022    Allergic rhinitis, Dr Phyllis Bland 10/28/2013    Anxiety     Back pain     Bilateral carotid artery stenosis 2022    CAD (coronary artery disease)     Cat bite involving extremity 2013    Cat bite involving extremity 2013    Cellulitis 2013    Cellulitis, improving 2013    Cervical radiculitis 2014    Cervical radiculitis, surgery pending 2014    COPD (chronic obstructive pulmonary disease) ([facility] [address] 1/3/2017    CTS (carpal tunnel syndrome), Ortho 2014    Depressive disorder 3/25/2022    Dyslipidemia 10/28/2013    Fatigue 10/7/2013    Headache(784.0)     H/O MIGRANES    Hernia, hiatal     H/O     Hx of cardiac cath x 2, normal, last 9/13 10/7/2013    Osteoarthritis     S/P cervical spinal fusion, 5-6 2014    Seizure disorder ([address]     Seizure disorder ([address]     Sinusitis 10/28/2013    Tobacco abuse, quit 9/3/13 10/7/2013    Tobacco abuse, quit 9/3/13      Surgical History:   Past Surgical History:   Procedure Laterality Date    APPENDECTOMY      CARDIAC CATHETERIZATION  2013    [address] SURGERY   fusion    CHOLECYSTECTOMY      COLONOSCOPY  2009    COSMETIC SURGERY  2010    on eye    JOINT REPLACEMENT Right 08/09/2021    hip    DC TOTAL KNEE ARTHROPLASTY Left 12/05/2017    LEFT TOTAL KNEE ARTHROPLASTY SUPINE BELEN PSI SPINAL (OUTSIDE PAT DR. OLIVA) performed by Jennifer Reaves MD at [facility] Right 7/28/2022    RIGHT HIP REVISION TOTAL HIP ARTHROPLASTY performed by Jonathan Puga MD at [address] [place] [address]       Medications:   Current Outpatient Medications:     gabapentin (NEURONTIN) 300 MG capsule, TAKE 1 CAPSULE TWICE DAILY & TAKE 3 CAPSULES AT BEDTIME, Disp: , Rfl:     escitalopram (LEXAPRO) 10 MG tablet, Take 5 mg by mouth, Disp: , Rfl:     topiramate (TOPAMAX) 25 MG tablet, Take 25 mg by mouth 2 times daily (Patient not taking: No sig reported), Disp: , Rfl:     nitroGLYCERIN (NITROSTAT) 0.4 MG SL tablet, Place 0.4 mg under the tongue every 5 minutes as needed for Chest pain up to max of 3 total doses. If no relief after 1 dose, call 911. (Patient not taking: Reported on 7/28/2022), Disp: , Rfl:     Probiotic Product (PROBIOTIC ADVANCED PO), Take by mouth, Disp: , Rfl:     sodium chloride (OCEAN, BABY AYR) 0.65 % nasal spray, 1 spray by Nasal route as needed for Congestion, Disp: , Rfl:     Cholecalciferol (VITAMIN D3) 50 MCG (2000 UT) CAPS, Take by mouth, Disp: , Rfl:     LORazepam (ATIVAN) 1 MG tablet, Take 1 mg by mouth 3 times daily as needed for Anxiety.  , Disp: , Rfl:     rosuvastatin (CRESTOR) 10 MG tablet, Take 10 mg by mouth daily, Disp: , Rfl:     loratadine (CLARITIN) 10 MG tablet, Take 1 tablet by mouth daily, Disp: 30 tablet, Rfl: 1    aspirin 81 MG EC tablet, Take 1 tablet by mouth 2 times daily, Disp: 60 tablet, Rfl: 0  Allergies: Doxycycline, Ibandronic acid, Keflex [cephalexin], Lidocaine, Nickel, Prednisone, Shellfish-derived products, Simvastatin, Tetracyclines & related, and Augmentin [amoxicillin-pot clavulanate]      SUBJECTIVE EXAMINATION     History obtained from[de-identified] Patient,           Subjective History:    Subjective: Had a Rt THR last August without restrictions. On 4/12/22 was outside, bent over and tripped and felt Rt hip go out. Had surgery to have hip put back in. On 7/5/22 was released to do normal activities and by 7/20/22 hip went out again. Had a Rt hip revision on 7/28/22. Pt had inpatient rehab for 4 days. Had [place] [id number] for about a month. Pt reports gait is off and legs sway a lot. Pt reports currently on steroids due to legs going numb at times - unsure if due to prosthetic having nickel and pt is allergic to nickel. Pt had restrictions after revision and then restrictions were released 2 weeks ago. Has been using a s/c to walk for a long time after surgery. Rt leg feels like it's going to give out at times.   Additional Pertinent Hx (if applicable): OA, COPD, CAD, cervical fusion, Rt THR with revision   Comment: RTD beginning of December      Learning/Language: Learning  Does the patient/guardian have any barriers to learning?: No barriers  Will there be a co-learner?: No  What is the preferred language of the patient/guardian?: English  Is an  required?: No  How does the patient/guardian prefer to learn new concepts?: Listening     Pain Screening    Pain Screening  Patient Currently in Pain: Yes  Pain Assessment: 0-10  Pain Level: 4  Best Pain Level: 3  Worst Pain Level: 10  Pain Location: Hip  Pain Orientation: Right    Functional Status    Social History:    Social History  Lives With: Alone  Type of Home: House  Home Layout: Two level, Bed/Bath upstairs  Home Access: Stairs to enter with rails  Entrance Stairs - Number of Steps: 4  Home Equipment: Phyliss Man, rolling    Occupation/Interests:   Occupation: Retired    Prior Level of Function:     Independent        Current Level of Function:   ADL Assistance: Independent  Homemaking Assistance: Independent  Ambulation Assistance: Independent  Transfer Assistance: Independent  Active : Yes         OBJECTIVE EXAMINATION     Restrictions:              Review of Systems:  Vision: Impaired  Visual Deficits: Hx of cataracts  Hearing: Within functional limits  Overall Orientation Status: Within Normal Limits  Patient affect[de-identified] Normal  Follows Commands: Within Functional Limits    Palpation:   Right Hip Palpation: Standing: Rt iliac crest slightly elevated    Mobility:   Ambulation  Surface: Carpet  Device: Single point cane, No Device  Assistance: Supervision, Independent  Quality of Gait: Slight Rt Trendelenburg, Rt LE appears longer than Lt  Gait Deviations: Slow Francoise, Decreased step length, Decreased step height  Distance: 80'      Neuro Screen: Sensation  Overall Sensation Status: Impaired (neuropathy from knee down on Lt leg)    Left AROM  Right AROM         AROM LLE (degrees)  L Hip Flexion 0-125: 112    AROM RLE (degrees)  R Hip Flexion 0-125: 98      Left Strength  Right Strength         Strength LLE  L Hip Flexion: 5/5  L Hip Extension: 3+/5  L Hip ABduction: 4/5  L Knee Flexion: 5/5  L Knee Extension: 5/5  L Ankle Dorsiflexion: 5/5    Strength RLE  R Hip Flexion: 5/5  R Hip Extension: 3/5  R Hip ABduction: 4-/5  R Knee Flexion: 5/5  R Knee Extension: 5/5  R Ankle Dorsiflexion: 5/5     Outcomes Score:  Exam: Decreased ROM and strength with increased pain impacting mobility and QOL. LEFS = 40/80      Treatment:    Exercises:   Exercises  Exercise 1: Bike*  Exercise 2: SLR*  Exercise 3: Bridges*  Exercise 4: Sidelying hip abd*  Exercise 5: Prone hip ext*  Exercise 6: Hip hikes*  Exercise 7: Step ups*  Exercise 8: STS*  Exercise 10: Aquatics:  Exercise 11: Gait drills*  Exercise 12: Sink ex*  Exercise 13: Hip circles*  Exercise 14:  Step ups*    *Indicates exercise,modality, or manual techniques to be initiated when appropriate       ASSESSMENT     Impression: Assessment: Pt presents with ongoing weakness and limited mobility after a Rt THR followed by a Rt hip revision. Pt with limited Rt hip flexion ROM due to stretching vs Lt. Pt with decreased strength in carlos hips likely impacting her stability when ambulating. PT tends to ambulate with excessive lateral trunk lean during Rt stance with a Rt Trendelenburg. Pt would benefit from further skilled PT to improve her ROM, strength and pain to increase her ease with all mobility. Body Structures, Functions, Activity Limitations Requiring Skilled Therapeutic Intervention: Decreased functional mobility , Decreased ROM, Decreased strength, Increased pain    Statement of Medical Necessity: Physical Therapy is both indicated and medically necessary as outlined in the POC to increase the likelihood of meeting the functionally related goals stated below. Patient's Activity Tolerance: Patient tolerated evaluation without incident      Patient's rehabilitation potential/prognosis is considered to be: Good    Factors which may impact rehabilitation potential include: None     Patient Education: Goals, PT Role, Plan of Care, Evaluative findings      GOALS   Patient Goal(s): Patient Goals : To walk normal    Short Term Goals Completed by 2 weeks Goal Status   Independent with HEP New     Long Term Goals Completed by 5 weeks Goal Status   LTG 1 Improve carlos LE strength to >/= 4+/5 to improve stability with standing and walking. New   LTG 2 Reduce Rt hip pain to </= 2/10 with all activity. New   LTG 3 Pt will ambulate with LRD vs no AD throughout clinic with minimal to no deviations S/I. New   LTG 4 LEFS >/= 49/80 to improve pt's QOL.  New          TREATMENT PLAN       Requires PT Follow-Up: Yes    Treatment may include any combination of the following: Strengthening, ROM, Balance training, Functional mobility training, Transfer training, Gait training, Stair training, Neuromuscular re-education, Manual Therapy - Soft Tissue Mobilization, Home exercise program, Safety education & training, Patient/Caregiver education & training, Equipment evaluation, education, & procurement, Modalities, Aquatics, Integrated dry needling     Frequency / Duration:  Patient to be seen 2 times per week for 5 weeks  Plan Comment:    Transfer care to [facility] PT, DPT          Eval Complexity:   Decision Making: Medium Complexity  History: Personal Factors and/or Comorbidities Impacting POC: High  History: PMH: OA, COPD, CAD, cervical fusion, Rt THR with revision  Examination of body system(s) including body structures and functions, activity limitations, and/or participation restrictions: High  Exam: Decreased ROM and strength with increased pain impacting mobility and QOL. LEFS = 40/80  Clinical Presentation: Medium  Clinical Presentation: Evolving    POST-PAIN     Pain Rating (0-10 pain scale): 0  /10  Location and pain description same as pre-treatment unless indicated. Action: [x] NA  [] Call Physician  [] Perform HEP  [] Meds as prescribed    Evaluation and patient rights have been reviewed and patient agrees with plan of care. Yes  [x]  No  []   Explain:     Nichole Fall Risk Assessment  Risk Factor Scale  Score   History of Falls [] Yes  [x] No 25  0 0   Secondary Diagnosis [] Yes  [x] No 15  0 0   Ambulatory Aid [] Furniture  [x] Crutches/cane/walker  [] None/bedrest/wheelchair/nurse 30  15  0 15   IV/Heparin Lock [] Yes  [x] No 20  0 0   Gait/Transferring [x] Impaired  [] Weak  [] Normal/bedrest/immobile 20  10  0 20   Mental Status [] Forgets limitations  [x] Oriented to own ability 15  0 0      Total:35     Based on the Assessment score: check the appropriate box.   []  No intervention needed   Low =   Score of 0-24  [x]  Use standard prevention interventions Moderate =  Score of 24-44   [x] Discuss fall prevention strategies   [x] Indicate moderate falls risk on eval  []  Use high risk prevention interventions High = Score of 45 and higher   [] Discuss fall prevention strategies   [] Provide supervision during treatment time      Minutes:  PT Individual Minutes  Time In: 1005  Time Out: 1050  Minutes: 45     Procedure Minutes: 45' eval     Electronically signed by Jesse Rivera PT on 10/31/22 at 2:00 PM EDT

## 2022-10-31 NOTE — PROGRESS NOTES
[facility] [address], [address]     Ph: [phone]  Fax: [phone]      [x] Certification  [] Recertification []  Plan of Care  [] Progress Note [] Discharge      Referring Provider: Darcy Rodriguez MD     From:  Isidoro Harris, PT  Patient: Joel Latif (89 y.o. female) : 1950 Date: 10/31/2022  Medical Diagnosis: Presence of unspecified artificial hip joint [Z96.649]       Treatment Diagnosis: Rt hip pain    Plan of Care/Certification Expiration Date: : 23   Progress Report Period from:  10/31/2022  to 10/31/2022    Visits to Date: 1 No Show: 0 Cancelled Appts: 0    OBJECTIVE:   Short Term Goals - Time Frame for Short Term Goals: 2 weeks    Goals Current/Discharge status  Status   Short Term Goal 1: Independent with HEP  ongoing New     Long Term Goals - Time Frame for Long Term Goals : 5 weeks  Goals Current/ Discharge status Status   Long Term Goal 1: Improve carlos LE strength to >/= 4+/5 to improve stability with standing and walking. Strength LLE  L Hip Flexion: 5/5  L Hip Extension: 3+/5  L Hip ABduction: 4/5  L Knee Flexion: 5/5  L Knee Extension: 5/5  L Ankle Dorsiflexion: 5/5  Strength RLE  R Hip Flexion: 5/5  R Hip Extension: 3/5  R Hip ABduction: 4-/5  R Knee Flexion: 5/5  R Knee Extension: 5/5  R Ankle Dorsiflexion: 5/5    New   Long Term Goal 2: Reduce Rt hip pain to </= 2/10 with all activity. 3-10/10 New   Long Term Goal 3: Pt will ambulate with LRD vs no AD throughout clinic with minimal to no deviations S/I. Ambulation  Surface: Carpet  Device: Single point cane, No Device  Assistance: Supervision, Independent  Quality of Gait: Slight Rt Trendelenburg, Rt LE appears longer than Lt  Gait Deviations: Slow Francoise, Decreased step length, Decreased step height  Distance: [de-identified]'    New   Long Term Goal 4: LEFS >/= 49/80 to improve pt's QOL.  40/80 New       Body Structures, Functions, Activity Limitations Requiring Skilled Therapeutic Intervention: Decreased functional mobility , Decreased ROM, Decreased strength, Increased pain  Assessment: Pt presents with ongoing weakness and limited mobility after a Rt THR followed by a Rt hip revision. Pt with limited Rt hip flexion ROM due to stretching vs Lt. Pt with decreased strength in carlos hips likely impacting her stability when ambulating. PT tends to ambulate with excessive lateral trunk lean during Rt stance with a Rt Trendelenburg. Pt would benefit from further skilled PT to improve her ROM, strength and pain to increase her ease with all mobility. Therapy Prognosis: Good      PT Education: Goals;PT Role;Plan of Care;Evaluative findings    PLAN: [x] Evaluate and Treat  Frequency/Duration:  Plan Frequency: 2  Plan weeks: 5  Current Treatment Recommendations: Strengthening, ROM, Balance training, Functional mobility training, Transfer training, Gait training, Stair training, Neuromuscular re-education, Manual Therapy - Soft Tissue Mobilization, Home exercise program, Safety education & training, Patient/Caregiver education & training, Equipment evaluation, education, & procurement, Modalities, Aquatics, Integrated dry needling  Additional Comments: Transfer care to Isadora [facility]iedo PT, DPT     Precautions:       falls                     Patient Status:[x] Continue/ Initiate plan of Care    [] Discharge PT. Recommend pt continue with HEP. [] Additional visits requested, Please re-certify for additional visits:    [] Hold         Signature: Electronically signed by Frantz Vasquez PT on 10/31/22 at 4:26 PM EDT      If you have any questions or concerns, please don't hesitate to call. Thank you for your referral.    I have reviewed this plan of care and certify a need for medically necessary rehabilitation services.     Physician Signature:__________________________________________________________  Date:  Please sign and return

## 2022-11-02 ENCOUNTER — HOSPITAL ENCOUNTER (OUTPATIENT)
Dept: PHYSICAL THERAPY | Age: 72
Setting detail: THERAPIES SERIES
Discharge: HOME | End: 2022-11-02
Payer: MEDICARE

## 2022-11-02 PROCEDURE — 97113 AQUATIC THERAPY/EXERCISES: CPT

## 2022-11-02 NOTE — PROGRESS NOTES
[facility]  Outpatient Physical Therapy    Treatment Note        Date: 2022  Patient: Bailey Nelson  : 1950   Confirmed: Yes  MRN: [id number]  Referring Provider: Dalia Arreguin MD    Medical Diagnosis: Presence of unspecified artificial hip joint [Z96.649]       Treatment Diagnosis: Rt hip pain    Visit Information:  Insurance: Payor: Denece Specter / Plan: MEDICARE PART A AND B / Product Type: *No Product type* /   PT Visit Information  PT Insurance Information: Medicare  Total # of Visits to Date: 2  Plan of Care/Certification Expiration Date: 23  No Show: 0  Progress Note Due Date: 22  Canceled Appointment: 0  Progress Note Counter: 2/10    Subjective Information:  Subjective: no pain currently, but sometimes it just feels like my RLE is going to give out. HEP Compliance:  [] Good [] Fair [] Poor [x] Reports not doing due to:issue sink ex on NV    Pain Screening  Patient Currently in Pain: Denies    Treatment:  Exercises:  Exercises  Exercise 11: Gait drills, F/R/S/high knee march x 3 laps  Exercise 12: Sink ex x 10 w/ circles  Exercise 14: Step ups F/L x 15, RLE       *Indicates exercise, modality, or manual techniques to be initiated when appropriate    Objective Measures    Strength: [x] NT  [] MMT completed:         ROM: [x] NT  [] ROM measurements:       Assessment: Body Structures, Functions, Activity Limitations Requiring Skilled Therapeutic Intervention: Decreased functional mobility , Decreased ROM, Decreased strength, Increased pain  Assessment: initiated aquatic exercises today, vc's for technique, good tolerance to session, no c/o pain. Treatment Diagnosis: Rt hip pain  Therapy Prognosis: Good       Post-Pain Assessment:       Pain Rating (0-10 pain scale):  0 /10   Location and pain description same as pre-treatment unless indicated.    Action: [] NA   [x] Perform HEP  [] Meds as prescribed  [] Modalities as prescribed   [] Call Physician     GOALS Patient Goal(s): Patient Goals : To walk normal    Short Term Goals Completed by 2 weeks Goal Status   STG 1 Independent with HEP In progress       Long Term Goals Completed by 5 weeks Goal Status   LTG 1 Improve carlos LE strength to >/= 4+/5 to improve stability with standing and walking. In progress   LTG 2 Reduce Rt hip pain to </= 2/10 with all activity. In progress   LTG 3 Pt will ambulate with LRD vs no AD throughout clinic with minimal to no deviations S/I. In progress   LTG 4 LEFS >/= 49/80 to improve pt's QOL. In progress   LTG 10                Plan:  Frequency/Duration:  Plan  Plan Frequency: 2  Plan weeks: 5  Current Treatment Recommendations: Strengthening, ROM, Balance training, Functional mobility training, Transfer training, Gait training, Stair training, Neuromuscular re-education, Manual Therapy - Soft Tissue Mobilization, Home exercise program, Safety education & training, Patient/Caregiver education & training, Equipment evaluation, education, & procurement, Modalities, Aquatics, Integrated dry needling  Additional Comments: Transfer care to [facility] PT, DPT  Pt to continue current HEP. See objective section for any therapeutic exercise changes, additions or modifications this date.     Therapy Time:      PT Individual Minutes  Time In: 0829  Time Out: 6770  Minutes: 38  Timed Code Treatment Minutes: 38 Minutes  Procedure Minutes:0  Timed Activity Minutes Units   aquatic Ex 38 3     Electronically signed by Isac Solis PTA on 11/2/22 at 1:57 PM EDT

## 2022-11-09 ENCOUNTER — HOSPITAL ENCOUNTER (OUTPATIENT)
Dept: PHYSICAL THERAPY | Age: 72
Setting detail: THERAPIES SERIES
Discharge: HOME | End: 2022-11-09
Payer: MEDICARE

## 2022-11-09 PROCEDURE — 97110 THERAPEUTIC EXERCISES: CPT

## 2022-11-09 NOTE — PROGRESS NOTES
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  : 1950   Confirmed: Yes  MRN: [id number]  Referring Provider: Wilfredo Bermudez MD    Medical Diagnosis: Presence of unspecified artificial hip joint [Z96.649]       Treatment Diagnosis: Rt hip pain    Visit Information:  Insurance: Payor: MEDICARE / Plan: MEDICARE PART A AND B / Product Type: *No Product type* /   PT Visit Information  PT Insurance Information: Medicare  Total # of Visits to Date: 3  Plan of Care/Certification Expiration Date: 23  No Show: 0  Progress Note Due Date: 22  Canceled Appointment: 0  Progress Note Counter: 3/10    Subjective Information:  Subjective: no pain currently, But continues to feel like RLE is unstable  HEP Compliance:  [x] Good [] Fair [] Poor [] Reports not doing due to:         Treatment:  Exercises:  Exercises  Exercise 1: Scifit L 1.0 x 5 minutes  Exercise 2: SLR x 15  Exercise 3: Bridges 5 sec x 15  Exercise 4: Sidelying hip abd x 15  Exercise 5: Prone hip ext x 15  Exercise 7: Step ups 4 \" x 15 F/L  Exercise 20: HEP: SLR, S/L Abd, Prone hip ext, Bridges, Stepups fwd & lat. Modalities:   Declined       *Indicates exercise, modality, or manual techniques to be initiated when appropriate    Objective Measures:        Strength: [x] NT  [] MMT completed:     ROM: [x] NT  [] ROM measurements:     Assessment: Body Structures, Functions, Activity Limitations Requiring Skilled Therapeutic Intervention: Decreased functional mobility , Decreased ROM, Decreased strength, Increased pain  Assessment: Initiated land based exercises per POC. . Much verbal cueing to reorient to task. Good tolerance to exercises struggles with hip extension prone. No instability noted in LE's.   Treatment Diagnosis: Rt hip pain  Therapy Prognosis: Good          Post-Pain Assessment:       Pain Rating (0-10 pain scale):   0/10   Location and pain description same as pre-treatment unless indicated. Action: [] NA   [x] Perform HEP  [] Meds as prescribed  [] Modalities as prescribed   [] Call Physician     GOALS   Patient Goal(s): Patient Goals : To walk normal    Short Term Goals Completed by 2 weeks Goal Status   STG 1 Independent with HEP In progress     Long Term Goals Completed by 5 weeks Goal Status   LTG 1 Improve carlos LE strength to >/= 4+/5 to improve stability with standing and walking. In progress   LTG 2 Reduce Rt hip pain to </= 2/10 with all activity. In progress   LTG 3 Pt will ambulate with LRD vs no AD throughout clinic with minimal to no deviations S/I. In progress   LTG 4 LEFS >/= 49/80 to improve pt's QOL. In progress          Plan:  Frequency/Duration:     Pt to continue current HEP. See objective section for any therapeutic exercise changes, additions or modifications this date.     Therapy Time:      PT Individual Minutes  Time In: 1120  Time Out: 1783  Minutes: 42  Timed Code Treatment Minutes: 42 Minutes  Procedure Minutes:0    Timed Activity Minutes Units   Ther Ex 42 3     Electronically signed by Faith Salinas PTA on 11/9/22 at 12:28 PM EST

## 2022-11-11 ENCOUNTER — HOSPITAL ENCOUNTER (OUTPATIENT)
Dept: PHYSICAL THERAPY | Age: 72
Setting detail: THERAPIES SERIES
Discharge: HOME | End: 2022-11-11
Payer: MEDICARE

## 2022-11-11 PROCEDURE — 97113 AQUATIC THERAPY/EXERCISES: CPT

## 2022-11-11 ASSESSMENT — PAIN DESCRIPTION - LOCATION: LOCATION: HIP

## 2022-11-11 ASSESSMENT — PAIN DESCRIPTION - PAIN TYPE: TYPE: SURGICAL PAIN

## 2022-11-11 ASSESSMENT — PAIN DESCRIPTION - ORIENTATION: ORIENTATION: RIGHT

## 2022-11-11 ASSESSMENT — PAIN SCALES - GENERAL: PAINLEVEL_OUTOF10: 5

## 2022-11-11 ASSESSMENT — PAIN DESCRIPTION - DESCRIPTORS: DESCRIPTORS: ACHING;SORE

## 2022-11-11 NOTE — PROGRESS NOTES
[facility]  Outpatient Physical Therapy    Treatment Note        Date: 2022  Patient: Jennifer Winston  : 1950   Confirmed: Yes  MRN: [id number]  Referring Provider: Evie Bowie MD    Medical Diagnosis: Presence of unspecified artificial hip joint [Z96.649]       Treatment Diagnosis: Rt hip pain    Visit Information:  Insurance: Payor: Yuliteri Arevalowilfredjoel / Plan: MEDICARE PART A AND B / Product Type: *No Product type* /   PT Visit Information  PT Insurance Information: Medicare  Total # of Visits to Date: 4  Plan of Care/Certification Expiration Date: 23  No Show: 0  Progress Note Due Date: 22  Canceled Appointment: 0  Progress Note Counter: 4/10    Subjective Information:  Subjective: patient reports she is in increased pain this date. states shes at about a 5/10. was worried about difficulty complete prone hip extension last visit on land  HEP Compliance:  [x] Good [] Fair [] Poor [] Reports not doing due to:    Pain Screening  Patient Currently in Pain: Yes  Pain Assessment: 0-10  Pain Level: 5  Pain Type: Surgical pain  Pain Location: Hip  Pain Orientation: Right  Pain Descriptors: Aching, Sore    Treatment:  Exercises:  Exercises  Exercise 10: Aquatics:  Exercise 11: Gait drills, F/R/S/high knee march x 3 laps  Exercise 12: Sink exercises: hip abduction, hip extension, squats 1 set x 20 reps each (carlos)  Exercise 13: Hip circles CW/CCW carlos x10 each  Exercise 14: Step ups F/L x 20 RLE  Exercise 20: HEP: cont current     *Indicates exercise, modality, or manual techniques to be initiated when appropriate    Objective Measures:      Strength: [x] NT  [] MMT completed:     ROM: [x] NT  [] ROM measurements:     Assessment:    Body Structures, Functions, Activity Limitations Requiring Skilled Therapeutic Intervention: Decreased functional mobility , Decreased ROM, Decreased strength, Increased pain  Assessment: Cont with aquatics this session with focus on R LE mobility and strengthening. Challenged patient by having her use intermittent UE support during activities and patient's balance was significant challenged, frequent cuing utizlied to remind patient to use support when needed. Cont to progress as tolerated. Treatment Diagnosis: Rt hip pain  Therapy Prognosis: Good     Post-Pain Assessment:       Pain Rating (0-10 pain scale):   \"I feel better\"/10   Location and pain description same as pre-treatment unless indicated. Action: [] NA   [x] Perform HEP  [] Meds as prescribed  [] Modalities as prescribed   [] Call Physician     GOALS   Patient Goal(s): Patient Goals : To walk normal    Short Term Goals Completed by 2 weeks Goal Status   STG 1 Independent with HEP In progress     Long Term Goals Completed by 5 weeks Goal Status   LTG 1 Improve carlos LE strength to >/= 4+/5 to improve stability with standing and walking. In progress   LTG 2 Reduce Rt hip pain to </= 2/10 with all activity. In progress   LTG 3 Pt will ambulate with LRD vs no AD throughout clinic with minimal to no deviations S/I. In progress   LTG 4 LEFS >/= 49/80 to improve pt's QOL. In progress          Plan:  Frequency/Duration:  Plan  Plan Frequency: 2  Plan weeks: 5  Current Treatment Recommendations: Strengthening, ROM, Balance training, Functional mobility training, Transfer training, Gait training, Stair training, Neuromuscular re-education, Manual Therapy - Soft Tissue Mobilization, Home exercise program, Safety education & training, Patient/Caregiver education & training, Equipment evaluation, education, & procurement, Modalities, Aquatics, Integrated dry needling  Additional Comments: Transfer care to [facility] PT, DPT  Pt to continue current HEP. See objective section for any therapeutic exercise changes, additions or modifications this date.     Therapy Time:      PT Individual Minutes  Time In: 1118  Time Out: 1200  Minutes: 42  Timed Code Treatment Minutes: 42 Minutes  Procedure Minutes:  Timed Activity Minutes Units   [facility] 3     Electronically signed by Ashley Alcantara PT on 11/11/22 at 12:13 PM EST

## 2022-11-15 ENCOUNTER — HOSPITAL ENCOUNTER (OUTPATIENT)
Dept: PHYSICAL THERAPY | Age: 72
Setting detail: THERAPIES SERIES
Discharge: HOME | End: 2022-11-15
Payer: MEDICARE

## 2022-11-15 PROCEDURE — 97110 THERAPEUTIC EXERCISES: CPT

## 2022-11-15 NOTE — PROGRESS NOTES
[facility]  Outpatient Physical Therapy    Treatment Note        Date: 11/15/2022  Patient: Ed Nunez  : 1950   Confirmed: Yes  MRN: [id number]  Referring Provider: Carlo Brunner MD    Medical Diagnosis: Presence of unspecified artificial hip joint [Z96.649]       Treatment Diagnosis: Rt hip pain    Visit Information:  Insurance: Payor: [place] / Plan: MEDICARE PART A AND B / Product Type: *No Product type* /   PT Visit Information  PT Insurance Information: Medicare  Total # of Visits to Date: 5  Plan of Care/Certification Expiration Date: 23  No Show: 0  Progress Note Due Date: 22  Canceled Appointment: 0  Progress Note Counter: 5/10    Subjective Information:  Subjective: Patient reports with a 5/10 level of \"soreness\" in hip. HEP Compliance:  [x] Good [] Fair [] Poor [] Reports not doing due to:    Pain Screening  Patient Currently in Pain: Denies    Treatment:  Exercises:  Exercises  Exercise 1: Scifit L 2.0 x 5 minutes  Exercise 2: SLR x 20  Exercise 3: Bridges 5 sec x 20  Exercise 4: Sidelying hip abd x 20  Exercise 5: Prone hip ext x 20  Exercise 6: Hip hikes x 10  Exercise 7: Step ups 4 \" x 20 F/L  Exercise 8: STS w/out UE assist x 10  Exercise 9: H/L ADD & ABD Ball & RTB x 20  Exercise 20: HEP: SLR, S/L Abd, Prone hip ext, Bridges, Stepups fwd & lat. Modalities:   Declined       *Indicates exercise, modality, or manual techniques to be initiated when appropriate    Objective Measures:         Strength: [x] NT  [] MMT completed:     ROM: [x] NT  [] ROM measurements:       Assessment: Body Structures, Functions, Activity Limitations Requiring Skilled Therapeutic Intervention: Decreased functional mobility , Decreased ROM, Decreased strength, Increased pain  Assessment: Continue to progress current exercxises with focus on LE strengthening. Increased most exercises and added Hip Hikes to futher improve hip strength and gait.  Declined CP will do at home.  Treatment Diagnosis: Rt hip pain  Therapy Prognosis: Good          Post-Pain Assessment:       Pain Rating (0-10 pain scale):  0 /10   Location and pain description same as pre-treatment unless indicated. Action: [] NA   [x] Perform HEP  [] Meds as prescribed  [] Modalities as prescribed   [] Call Physician     GOALS   Patient Goal(s): Patient Goals : To walk normal    Short Term Goals Completed by 2 weeks Goal Status   STG 1 Independent with HEP In progress     Long Term Goals Completed by 5 weeks Goal Status   LTG 1 Improve carlos LE strength to >/= 4+/5 to improve stability with standing and walking. In progress   LTG 2 Reduce Rt hip pain to </= 2/10 with all activity. In progress   LTG 3 Pt will ambulate with LRD vs no AD throughout clinic with minimal to no deviations S/I. In progress   LTG 4 LEFS >/= 49/80 to improve pt's QOL. In progress          Plan:  Frequency/Duration:  Plan  Plan Frequency: 2  Plan weeks: 5  Current Treatment Recommendations: Strengthening, ROM, Balance training, Functional mobility training, Transfer training, Gait training, Stair training, Neuromuscular re-education, Manual Therapy - Soft Tissue Mobilization, Home exercise program, Safety education & training, Patient/Caregiver education & training, Equipment evaluation, education, & procurement, Modalities, Aquatics, Integrated dry needling  Additional Comments: Transfer care to [facility] PT, DPT  Pt to continue current HEP. See objective section for any therapeutic exercise changes, additions or modifications this date.     Therapy Time:      PT Individual Minutes  Time In: 6336  Time Out: 1116  Minutes: 43  Timed Code Treatment Minutes: 43 Minutes  Procedure Minutes:0    Timed Activity Minutes Units   Ther Ex 43 3     Electronically signed by Loi Merchant PTA on 11/15/22 at 12:17 PM EST

## 2022-11-17 ENCOUNTER — HOSPITAL ENCOUNTER (OUTPATIENT)
Dept: PHYSICAL THERAPY | Age: 72
Setting detail: THERAPIES SERIES
Discharge: HOME | End: 2022-11-17
Payer: MEDICARE

## 2022-11-17 PROCEDURE — 97113 AQUATIC THERAPY/EXERCISES: CPT

## 2022-11-17 ASSESSMENT — PAIN SCALES - GENERAL: PAINLEVEL_OUTOF10: 7

## 2022-11-17 ASSESSMENT — PAIN DESCRIPTION - DESCRIPTORS: DESCRIPTORS: ACHING;SORE

## 2022-11-17 ASSESSMENT — PAIN DESCRIPTION - ORIENTATION: ORIENTATION: RIGHT

## 2022-11-17 ASSESSMENT — PAIN DESCRIPTION - PAIN TYPE: TYPE: SURGICAL PAIN

## 2022-11-17 ASSESSMENT — PAIN DESCRIPTION - LOCATION: LOCATION: HIP

## 2022-11-17 NOTE — PROGRESS NOTES
[facility]  Outpatient Physical Therapy    Treatment Note        Date: 2022  Patient: Margo Garsia  : 1950   Confirmed: Yes  MRN: [id number]  Referring Provider: Jessica Avila MD    Medical Diagnosis: Presence of unspecified artificial hip joint [Z96.649]       Treatment Diagnosis: Rt hip pain    Visit Information:  Insurance: Payor: Gaby Wilhelm / Plan: MEDICARE PART A AND B / Product Type: *No Product type* /   PT Visit Information  PT Insurance Information: Medicare  Total # of Visits to Date: 6  Plan of Care/Certification Expiration Date: 23  No Show: 0  Progress Note Due Date: 22  Canceled Appointment: 0  Progress Note Counter: 6/10    Subjective Information:  Subjective: \"Getting out of bed was hard today. \"  Patient requesting to finish up last 4 sessions in pool. HEP Compliance:  [x] Good [] Fair [] Poor [] Reports not doing due to:    Pain Screening  Patient Currently in Pain: Yes  Pain Assessment: 0-10  Pain Level: 7  Pain Type: Surgical pain  Pain Location: Hip  Pain Orientation: Right  Pain Descriptors: Aching, Sore    Treatment:  Exercises:  Exercises  Exercise 10: Aquatics:  Exercise 11: Gait drills, F/R/S/high knee march x 3 laps  Exercise 12: Sink exercises: hip abduction, hip extension, squats 1 set x 20 reps each (carlos)  Exercise 13: Hip circles CW/CCW carlos x10 each  Exercise 14: Step ups 4-way, bilateral UE support, 1 set x 15 reps each direction  Exercise 15: leg press with noodle x1, 1 set x 20 reps x 5 seconds  Exercise 20: HEP: cont current       *Indicates exercise, modality, or manual techniques to be initiated when appropriate    Objective Measures:       Strength: [x] NT  [] MMT completed:    ROM: [x] NT  [] ROM measurements:    Assessment:    Body Structures, Functions, Activity Limitations Requiring Skilled Therapeutic Intervention: Decreased functional mobility , Decreased ROM, Decreased strength, Increased pain  Assessment: Changed patient's last remaining 4 visits to pool as patient feels she has a comprehensive land program at this time from prior therapy bouts. Plan to finalize patient's aquatics PT POC over the next 4 visits for patient to continue to perform aquatics at facility upon discharge. Addition of unilateral leg presses with noodles to patient's PT POC with challenge getting into position with noodle. Treatment Diagnosis: Rt hip pain  Therapy Prognosis: Good    Post-Pain Assessment:       Pain Rating (0-10 pain scale):  \"better\" /10   Location and pain description same as pre-treatment unless indicated. Action: [] NA   [x] Perform HEP  [] Meds as prescribed  [x] Modalities as prescribed   [] Call Physician     GOALS   Patient Goal(s): Patient Goals : To walk normal    Short Term Goals Completed by 2 weeks Goal Status   STG 1 Independent with HEP In progress     Long Term Goals Completed by 5 weeks Goal Status   LTG 1 Improve carlos LE strength to >/= 4+/5 to improve stability with standing and walking. In progress   LTG 2 Reduce Rt hip pain to </= 2/10 with all activity. In progress   LTG 3 Pt will ambulate with LRD vs no AD throughout clinic with minimal to no deviations S/I. In progress   LTG 4 LEFS >/= 49/80 to improve pt's QOL. In progress       Plan:  Frequency/Duration:  Plan  Plan Frequency: 2  Plan weeks: 5  Current Treatment Recommendations: Strengthening, ROM, Balance training, Functional mobility training, Transfer training, Gait training, Stair training, Neuromuscular re-education, Manual Therapy - Soft Tissue Mobilization, Home exercise program, Safety education & training, Patient/Caregiver education & training, Equipment evaluation, education, & procurement, Modalities, Aquatics, Integrated dry needling  Additional Comments: Transfer care to [facility] PT, DPT  Pt to continue current HEP. See objective section for any therapeutic exercise changes, additions or modifications this date.     Therapy Time:      PT Individual Minutes  Time In: 2548  Time Out: 1125  Minutes: 43  Timed Code Treatment Minutes: 43 Minutes  Procedure Minutes: 0 minutes  Timed Activity Minutes Units   Aquatics 43 3   Electronically signed by Tressie Kehr, PT on 11/17/22 at 11:31 AM EST

## 2022-11-21 ENCOUNTER — HOSPITAL ENCOUNTER (OUTPATIENT)
Dept: PHYSICAL THERAPY | Age: 72
Setting detail: THERAPIES SERIES
Discharge: HOME | End: 2022-11-21
Payer: MEDICARE

## 2022-11-21 PROCEDURE — 97113 AQUATIC THERAPY/EXERCISES: CPT

## 2022-11-21 ASSESSMENT — PAIN DESCRIPTION - PAIN TYPE: TYPE: SURGICAL PAIN

## 2022-11-21 ASSESSMENT — PAIN DESCRIPTION - DESCRIPTORS: DESCRIPTORS: ACHING;SORE

## 2022-11-21 ASSESSMENT — PAIN DESCRIPTION - ORIENTATION: ORIENTATION: RIGHT

## 2022-11-21 ASSESSMENT — PAIN DESCRIPTION - LOCATION: LOCATION: HIP

## 2022-11-21 ASSESSMENT — PAIN SCALES - GENERAL: PAINLEVEL_OUTOF10: 5

## 2022-11-21 NOTE — PROGRESS NOTES
patient's aquatics PT POC over the next 3 visits for patient to continue to perform aquatics at facility upon discharge. Addition of kickboard with ambulation exercises for increased challenge & resistance. Addition of deep end exercises with good tolerance. Patient continues to report improved pain post-treatment, however, improved pain only last for a little bit of time. Treatment Diagnosis: Rt hip pain  Therapy Prognosis: Good    Post-Pain Assessment:       Pain Rating (0-10 pain scale):  \"better\" /10   Location and pain description same as pre-treatment unless indicated. Action: [] NA   [x] Perform HEP  [] Meds as prescribed  [x] Modalities as prescribed   [] Call Physician     GOALS   Patient Goal(s): Patient Goals : To walk normal    Short Term Goals Completed by 2 weeks Goal Status   STG 1 Independent with HEP In progress     Long Term Goals Completed by 5 weeks Goal Status   LTG 1 Improve carlos LE strength to >/= 4+/5 to improve stability with standing and walking. In progress   LTG 2 Reduce Rt hip pain to </= 2/10 with all activity. In progress   LTG 3 Pt will ambulate with LRD vs no AD throughout clinic with minimal to no deviations S/I. In progress   LTG 4 LEFS >/= 49/80 to improve pt's QOL. In progress       Plan:  Frequency/Duration:  Plan  Plan Frequency: 2  Plan weeks: 5  Current Treatment Recommendations: Strengthening, ROM, Balance training, Functional mobility training, Transfer training, Gait training, Stair training, Neuromuscular re-education, Manual Therapy - Soft Tissue Mobilization, Home exercise program, Safety education & training, Patient/Caregiver education & training, Equipment evaluation, education, & procurement, Modalities, Aquatics, Integrated dry needling  Additional Comments: Transfer care to [facility] PT, DPT  Pt to continue current HEP. See objective section for any therapeutic exercise changes, additions or modifications this date.     Therapy Time:      PT Individual Minutes  Time In: 1300  Time Out: 6436  Minutes: 38  Timed Code Treatment Minutes: 38 Minutes  Procedure Minutes: 0 minutes  Timed Activity Minutes Units   Aquatics 38 3   Electronically signed by Radha Juarez PT on 11/21/22 at 1:47 PM EST

## 2022-11-23 ENCOUNTER — HOSPITAL ENCOUNTER (OUTPATIENT)
Dept: PHYSICAL THERAPY | Age: 72
Setting detail: THERAPIES SERIES
Discharge: HOME | End: 2022-11-23
Payer: MEDICARE

## 2022-11-23 PROCEDURE — 97113 AQUATIC THERAPY/EXERCISES: CPT

## 2022-11-23 ASSESSMENT — PAIN DESCRIPTION - LOCATION: LOCATION: LEG

## 2022-11-23 ASSESSMENT — PAIN SCALES - GENERAL: PAINLEVEL_OUTOF10: 7

## 2022-11-23 ASSESSMENT — PAIN DESCRIPTION - DESCRIPTORS: DESCRIPTORS: THROBBING

## 2022-11-23 NOTE — PROGRESS NOTES
[facility]  Outpatient Physical Therapy    Treatment Note        Date: 2022  Patient: Shraddha Guo  : 1950   Confirmed: Yes  MRN: [id number]  Referring Provider: Ijeoma Molina MD    Medical Diagnosis: Presence of unspecified artificial hip joint [Z96.649]       Treatment Diagnosis: Rt hip pain    Visit Information:  Insurance: Payor: José Manuel Alert / Plan: MEDICARE PART A AND B / Product Type: *No Product type* /   PT Visit Information  PT Insurance Information: Medicare  Total # of Visits to Date: 8  Plan of Care/Certification Expiration Date: 23  No Show: 0  Progress Note Due Date: 22  Canceled Appointment: 0  Progress Note Counter: 8/10    Subjective Information:  Subjective: \"I just have aches and pains\" Reports stiffness in the AMs, challenged with stairs. Patient reports decrease in pain while in the water. HEP Compliance:  [x] Good [] Fair [] Poor [] Reports not doing due to:    Pain Screening  Patient Currently in Pain: Yes  Pain Level: 7  Pain Location: Leg  Pain Descriptors: Throbbing    Treatment:  Exercises:  Exercises  Exercise 9: deep end exercises with bilateral noodles, 2 minute increments, bicycles, forward/backward scissors, side/side scissors, high knee marches, hanging  Exercise 10: Aquatics:  Exercise 11: Gait drills, F/R/S/high knee march x 3 laps with kickboard for increased resistance  Exercise 13: Hip circles CW/CCW carlos x10 each  Exercise 14: Step ups 2-way, bilateral UE support, 1 set x 20 reps each direction  Exercise 16: Single stepping with focus on quality F/L x10 ea            *Indicates exercise, modality, or manual techniques to be initiated when appropriate          Assessment: Body Structures, Functions, Activity Limitations Requiring Skilled Therapeutic Intervention: Decreased functional mobility , Decreased ROM, Decreased strength, Increased pain  Assessment: Initiated single stepping as patient reports increased deviations with gait. VCs for knee extension such as while completing hip circles and deep end open chain exercises. Decrease in pain at end of session, VCs to facilitate heel strike and hip flexion throughout ambulation. Treatment Diagnosis: Rt hip pain  Therapy Prognosis: Good          Post-Pain Assessment:       Pain Rating (0-10 pain scale):   0/10   Location and pain description same as pre-treatment unless indicated. Action: [x] NA   [] Perform HEP  [] Meds as prescribed  [] Modalities as prescribed   [] Call Physician     GOALS   Patient Goal(s): Patient Goals : To walk normal    Short Term Goals Completed by 2 weeks Goal Status   STG 1 Independent with HEP In progress     Long Term Goals Completed by 5 weeks Goal Status   LTG 1 Improve carlos LE strength to >/= 4+/5 to improve stability with standing and walking. In progress   LTG 2 Reduce Rt hip pain to </= 2/10 with all activity. In progress   LTG 3 Pt will ambulate with LRD vs no AD throughout clinic with minimal to no deviations S/I. In progress   LTG 4 LEFS >/= 49/80 to improve pt's QOL. In progress     Plan:  Frequency/Duration:  Plan  Plan Frequency: 2  Plan weeks: 5  Current Treatment Recommendations: Strengthening, ROM, Balance training, Functional mobility training, Transfer training, Gait training, Stair training, Neuromuscular re-education, Manual Therapy - Soft Tissue Mobilization, Home exercise program, Safety education & training, Patient/Caregiver education & training, Equipment evaluation, education, & procurement, Modalities, Aquatics, Integrated dry needling  Additional Comments: Transfer care to [facility] PT, DPT  Pt to continue current HEP. See objective section for any therapeutic exercise changes, additions or modifications this date.     Therapy Time:      PT Individual Minutes  Time In: 0793  Time Out: 1202  Minutes: 40  Timed Code Treatment Minutes: 40 Minutes  Procedure Minutes:0  Timed Activity Minutes Units   Aquatic 40 3     Electronically signed by Wellington Childress PTA on 11/23/22 at 10:00 AM EST

## 2022-11-28 ENCOUNTER — HOSPITAL ENCOUNTER (OUTPATIENT)
Dept: PHYSICAL THERAPY | Age: 72
Setting detail: THERAPIES SERIES
Discharge: HOME | End: 2022-11-28
Payer: MEDICARE

## 2022-11-28 PROCEDURE — 97113 AQUATIC THERAPY/EXERCISES: CPT

## 2022-11-28 ASSESSMENT — PAIN DESCRIPTION - ORIENTATION: ORIENTATION: RIGHT

## 2022-11-28 ASSESSMENT — PAIN SCALES - GENERAL: PAINLEVEL_OUTOF10: 5

## 2022-11-28 ASSESSMENT — PAIN DESCRIPTION - LOCATION: LOCATION: LEG

## 2022-11-28 ASSESSMENT — PAIN DESCRIPTION - PAIN TYPE: TYPE: SURGICAL PAIN

## 2022-11-28 NOTE — PROGRESS NOTES
[facility]  Outpatient Physical Therapy    Treatment Note        Date: 2022  Patient: Joel Latif  : 1950   Confirmed: Yes  MRN: [id number]  Referring Provider: Darcy Rodriguez MD    Medical Diagnosis: Presence of unspecified artificial hip joint [Z96.649]       Treatment Diagnosis: Rt hip pain    Visit Information:  Insurance: Payor: Nicko Madison / Plan: MEDICARE PART A AND B / Product Type: *No Product type* /   PT Visit Information  PT Insurance Information: Medicare  Total # of Visits to Date: 9  Plan of Care/Certification Expiration Date: 23  No Show: 0  Progress Note Due Date: 22  Canceled Appointment: 0  Progress Note Counter: 9/10    Subjective Information:  Subjective: Patient reports she is still struggling with N/T into legs. 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 moya to get this checked out because she is sure if this may be a root cause of her pain. States she feels pain at night when she is resting her legs and unsure why. f/u with PCP on friday. HEP Compliance:  [x] Good [] Fair [] Poor [] Reports not doing due to:    Pain Screening  Patient Currently in Pain: Yes  Pain Level: 5  Pain Type: Surgical pain  Pain Location: Leg  Pain Orientation: Right    Treatment:  Exercises:  Exercises  Exercise 9: Deep end w/ carlos DB: fwd/lateral scissor kicks, bicycles, hip circles x 1 min each activity  Exercise 11: Gait drills, F/R/S x3 laps  Exercise 14:  Step ups 2-way, bilateral UE support, 1 set x 20 reps each direction  Exercise 19: objective measures  Exercise 20: HEP: cont current     *Indicates exercise, modality, or manual techniques to be initiated when appropriate    Objective Measures:      Ambulation  Surface: Level tile, Carpet  Device: No Device  Assistance: Supervision  Quality of Gait: decreased WB on R LE, step-too gait pattern, slight FF trunk posture, increased lateral trunk flexion during swing phase  Gait Deviations: Slow Francoise, Decreased step length, Decreased step height  Distance: 50'    Strength: [] NT  [x] MMT completed:  Strength RLE  R Hip Flexion: 3+/5  R Hip Extension: 3-/5  R Hip ABduction: 3+/5  R Knee Flexion: 5/5  R Knee Extension: 5/5     ROM: [x] NT  [] ROM measurements:     LEFS: 32/80    Assessment: Body Structures, Functions, Activity Limitations Requiring Skilled Therapeutic Intervention: Decreased functional mobility , Decreased ROM, Decreased strength, Increased pain  Assessment: Patient is a 72 yo female who has completed a total of 9 aquatic appointments to date s/p R TATE revision. PN completed this date for medicare. patient demonstrated minimal to no strength improvements this date, no change in pain this date stating her pain has transitioned from her hip to her legs. Patient also demonstrated reduction in LEFS score this date, indicating decreased subjective QOL when compared to initial evaluation. Patient continues to ambulate with SPC, however forgot it this date and demonstrated gait deficits limiting overall general mobility when WB. Cont skilled therapy indicated to help improve pain, gait and strength with possible transition to land d/t patient being independent in the pool. Treatment Diagnosis: Rt hip pain  Therapy Prognosis: Good     Post-Pain Assessment:       Pain Rating (0-10 pain scale):   \"no change\"/10   Location and pain description same as pre-treatment unless indicated. Action: [] NA   [x] Perform HEP  [] Meds as prescribed  [] Modalities as prescribed   [] Call Physician     GOALS   Patient Goal(s): Patient Goals : To walk normal    Short Term Goals Completed by 2 weeks Goal Status   STG 1 Independent with HEP Met     Long Term Goals Completed by 5 weeks Goal Status   LTG 1 Improve carlos LE strength to >/= 4+/5 to improve stability with standing and walking. In progress, Not Met   LTG 2 Reduce Rt hip pain to </= 2/10 with all activity.  In progress, Not Met   LTG 3 Pt will ambulate with LRD vs no AD throughout clinic with minimal to no deviations S/I. In progress, Not Met   LTG 4 LEFS >/= 49/80 to improve pt's QOL. In progress, Not Met          Plan:  Frequency/Duration:  Plan  Plan Frequency: 2  Plan weeks: 5  Current Treatment Recommendations: Strengthening, ROM, Balance training, Functional mobility training, Transfer training, Gait training, Stair training, Neuromuscular re-education, Manual Therapy - Soft Tissue Mobilization, Home exercise program, Safety education & training, Patient/Caregiver education & training, Equipment evaluation, education, & procurement, Modalities, Aquatics, Integrated dry needling  Additional Comments: discharge vs transition to land at . Janelkb Taylor 144 12/06/2022  Pt to continue current HEP. See objective section for any therapeutic exercise changes, additions or modifications this date.     Therapy Time:      PT Individual Minutes  Time In: 6850  Time Out: 9214  Minutes: 40  Timed Code Treatment Minutes: 40 Minutes  Procedure Minutes:  Timed Activity Minutes Units   Aquatic Ther Ex 40 3     Electronically signed by Robin Olivas PT on 11/28/22 at 12:42 PM EST

## 2022-11-28 NOTE — PROGRESS NOTES
[address]     Ph: [phone]  Fax: [phone]      [] Certification  [] Recertification []  Plan of Care  [x] Progress Note [] Discharge      Referring Provider: Darcy Rodriguez MD     From:  Keyla Toney, PT,DPT  Patient: Joel Latif (73 y.o. female) : 1950 Date: 2022  Medical Diagnosis: Presence of unspecified artificial hip joint [Z96.649]       Treatment Diagnosis: Rt hip pain    Plan of Care/Certification Expiration Date: : 23   Progress Report Period from:  10/31/2022  to 2022    Visits to Date: 9 No Show: 0 Cancelled Appts: 0    OBJECTIVE:   Short Term Goals - Time Frame for Short Term Goals: 2 weeks    Goals Current/Discharge status  Status   Short Term Goal 1: Independent with HEP  Understanding and compliant with aquatics Met     Long Term Goals - Time Frame for Long Term Goals : 5 weeks  Goals Current/ Discharge status Status   Long Term Goal 1: Improve carlos LE strength to >/= 4+/5 to improve stability with standing and walking. Strength RLE  R Hip Flexion: 3+/5  R Hip Extension: 3-/5  R Hip ABduction: 3+/5  R Knee Flexion: 5/5  R Knee Extension: 5/5  In progress, Not Met   Long Term Goal 2: Reduce Rt hip pain to </= 2/10 with all activity. Pain Screening  Patient Currently in Pain: Yes  Pain Level: 5  Pain Type: Surgical pain  Pain Location: Leg  Pain Orientation: Right In progress, Not Met   Long Term Goal 3: Pt will ambulate with LRD vs no AD throughout clinic with minimal to no deviations S/I.  Ambulation  Surface: Level tile, Carpet  Device: No Device  Assistance: Supervision  Quality of Gait: decreased WB on R LE, step-too gait pattern, slight FF trunk posture, increased lateral trunk flexion during swing phase  Gait Deviations: Slow Francoise, Decreased step length, Decreased step height  Distance: 48' In progress, Not Met   Long Term Goal 4: LEFS >/= 49/80 to improve pt's QOL. LEFS: 32/80 In progress, Not Met     Body Structures, Functions, Activity Limitations Requiring Skilled Therapeutic Intervention: Decreased functional mobility , Decreased ROM, Decreased strength, Increased pain  Assessment: Patient is a 70 yo female who has completed a total of 9 aquatic appointments to date s/p R TATE revision. PN completed this date for medicare. patient demonstrated minimal to no strength improvements this date, no change in pain this date stating her pain has transitioned from her hip to her legs. Patient also demonstrated reduction in LEFS score this date, indicating decreased subjective QOL when compared to initial evaluation. Patient continues to ambulate with SPC, however forgot it this date and demonstrated gait deficits limiting overall general mobility when WB. Cont skilled therapy indicated to help improve pain, gait and strength with possible transition to land d/t patient being independent in the pool. Therapy Prognosis: Good    PLAN: [x] Evaluate and Treat  Frequency/Duration:  Plan Frequency: 2  Plan weeks: 5  Current Treatment Recommendations: Strengthening, ROM, Balance training, Functional mobility training, Transfer training, Gait training, Stair training, Neuromuscular re-education, Manual Therapy - Soft Tissue Mobilization, Home exercise program, Safety education & training, Patient/Caregiver education & training, Equipment evaluation, education, & procurement, Modalities, Aquatics, Integrated dry needling  Additional Comments: discharge vs transition to land at [facility] 12/06/2022                   Patient Status:[x] Continue/ Initiate plan of Care    [] Discharge PT. Recommend pt continue with HEP. [] Additional visits requested, Please re-certify for additional visits:    [] Hold         Signature: Electronically signed by Rey Kuhn PT on 11/28/22 at 12:43 PM EST      If you have any questions or concerns, please don't hesitate to call.   Thank you for your referral.

## 2022-12-05 ENCOUNTER — APPOINTMENT (OUTPATIENT)
Dept: PHYSICAL THERAPY | Age: 72
End: 2022-12-05
Payer: MEDICARE

## 2022-12-06 ENCOUNTER — HOSPITAL ENCOUNTER (OUTPATIENT)
Dept: PHYSICAL THERAPY | Age: 72
Setting detail: THERAPIES SERIES
Discharge: HOME | End: 2022-12-06
Payer: MEDICARE

## 2022-12-06 PROCEDURE — 97113 AQUATIC THERAPY/EXERCISES: CPT

## 2022-12-06 ASSESSMENT — PAIN DESCRIPTION - PAIN TYPE: TYPE: SURGICAL PAIN

## 2022-12-06 ASSESSMENT — PAIN SCALES - GENERAL: PAINLEVEL_OUTOF10: 4

## 2022-12-06 ASSESSMENT — PAIN DESCRIPTION - ORIENTATION: ORIENTATION: RIGHT

## 2022-12-06 ASSESSMENT — PAIN DESCRIPTION - DESCRIPTORS: DESCRIPTORS: THROBBING

## 2022-12-06 ASSESSMENT — PAIN DESCRIPTION - LOCATION: LOCATION: LEG

## 2022-12-06 NOTE — PROGRESS NOTES
[address]     Ph: [phone]  Fax: [phone]      [] Certification  [] Recertification []  Plan of Care  [] Progress Note [x] Discharge      Referring Provider: Susan Mckeon MD     From:  Jenna Payton, PT, DPT  Patient: Luma Purdy (71 y.o. female) : 1950 Date: 2022  Medical Diagnosis: Presence of unspecified artificial hip joint [Z96.649]       Treatment Diagnosis: Rt hip pain    Plan of Care/Certification Expiration Date: : 23   Progress Report Period from:  10/31/2022  to 2022    Visits to Date: 10 No Show: 0 Cancelled Appts: 0    OBJECTIVE:   Short Term Goals - Time Frame for Short Term Goals: 2 weeks    Goals Current/Discharge status  Status   Short Term Goal 1: Independent with HEP  Independent/compliant Met     Long Term Goals - Time Frame for Long Term Goals : 5 weeks  Goals Current/ Discharge status Status   Long Term Goal 1: Improve carlos LE strength to >/= 4+/5 to improve stability with standing and walking. Strength RLE  R Hip Flexion: 3+/5  R Hip Extension: 3-/5  R Hip ABduction: 3+/5  R Knee Flexion: 5/5  R Knee Extension: 5/5  Partially met   Long Term Goal 2: Reduce Rt hip pain to </= 2/10 with all activity. Pain Screening  Patient Currently in Pain: Yes  Pain Assessment: 0-10  Pain Level: 4  Pain Type: Surgical pain  Pain Location: Leg  Pain Orientation: Right  Pain Descriptors: Throbbing Not Met   Long Term Goal 3: Pt will ambulate with LRD vs no AD throughout clinic with minimal to no deviations S/I. Continues to require SPC for ambulation with maximum/moderate gait deviations Not Met   Long Term Goal 4: LEFS >/= 49/80 to improve pt's QOL.  Exam: LEFS = 32/80 (-8 point improvement since initial evaluation)  Not Met     Assessment: Patient is a 71year old female who presented on 10/31/22 following a R TATE & right hip revision who has completed 10/10 anticipated outpatient PT sessions on land & in pool this date. Patient has been limited in making progress throughout this PT POC secondary to on-going pain in right hip affecting patient's improvements towards other goals as well. Patient self-reports ~40% of her normal self. Patient is seeing vascular MD in ~1-2 weeks. Patient is recommended to perform PT HEP regularly every day as tolerated as well as to follow-up with ortho MD as soon as possible. PLAN:   Frequency/Duration:  Additional Comments: Discharge                  Patient Status:[] Continue/ Initiate plan of Care    [x] Discharge PT. Recommend pt continue with HEP. [] Additional visits requested, Please re-certify for additional visits:    [] Hold         Signature: Electronically signed by Brandy Martines PT on 12/6/22 at 3:09 PM EST      If you have any questions or concerns, please don't hesitate to call. Thank you for your referral.    I have reviewed this plan of care and certify a need for medically necessary rehabilitation services.     Physician Signature:__________________________________________________________  Date:  Please sign and return

## 2022-12-06 NOTE — PROGRESS NOTES
[facility]  Outpatient Physical Therapy    Treatment Note        Date: 2022  Patient: Sid Kim  : 1950   Confirmed: Yes  MRN: [id number]  Referring Provider: Trina Rivera MD    Medical Diagnosis: Presence of unspecified artificial hip joint [Z96.649]       Treatment Diagnosis: Rt hip pain    Visit Information:  Insurance: Payor: Michelle Ramachandran / Plan: MEDICARE PART A AND B / Product Type: *No Product type* /   PT Visit Information  PT Insurance Information: Medicare  Total # of Visits to Date: 10  Plan of Care/Certification Expiration Date: 23  No Show: 0  Progress Note Due Date: 22  Canceled Appointment: 0  Progress Note Counter: 10/10    Subjective Information:  Subjective: Patient is in agreement with discharge this date because she does not feel like she is getting better. HEP Compliance:  [x] Good [] Fair [] Poor [] Reports not doing due to:    Pain Screening  Patient Currently in Pain: Yes  Pain Assessment: 0-10  Pain Level: 4  Pain Type: Surgical pain  Pain Location: Leg  Pain Orientation: Right  Pain Descriptors: Throbbing    Treatment:  Exercises:  Exercises  Exercise 11: Gait drills, F/R/S x3 laps  Exercise 12: Sink exercises: hip abduction, hip extension, squats 1 set x 20 reps each (carlos)  Exercise 14:  Step ups 2-way, bilateral UE support, 1 set x 20 reps each direction  Exercise 19: objective measures  Exercise 20: HEP: reviewed exercises/stretches      *Indicates exercise, modality, or manual techniques to be initiated when appropriate    Objective Measures:     Ambulation  Surface: Level tile, Carpet  Device: Single point cane  Other Apparatus: Left  Assistance: Modified Independent  Quality of Gait: decreased WB on R LE, step-to gait pattern, slight FF trunk posture, increased lateral trunk flexion during swing phase  Gait Deviations: Slow Francoise, Decreased step length, Decreased step height  Distance: clinic distances         Stairs  # Steps : 5  Stairs Height: 6\"  Rails: Bilateral  Device: No Device  Assistance: Modified independent   Comment: intermittent reciprocal & non-reciprocal stair pattern    Strength: [] NT  [x] MMT completed:  Strength RLE  R Hip Flexion: 3+/5  R Hip Extension: 3-/5  R Hip ABduction: 3+/5  R Knee Flexion: 5/5  R Knee Extension: 5/5      ROM: [] NT  [x] ROM measurements:     AROM LLE (degrees)  LLE General AROM: >90*   AROM RLE (degrees)  RLE General AROM: >90*     Assessment: Body Structures, Functions, Activity Limitations Requiring Skilled Therapeutic Intervention: Decreased functional mobility , Decreased ROM, Decreased strength, Increased pain  Assessment: Patient is a 71year old female who presented on 10/31/22 following a R TATE & right hip revision who has completed 10/10 anticipated outpatient PT sessions on land & in pool this date. Patient has been limited in making progress throughout this PT POC secondary to on-going pain in right hip affecting patient's improvements towards other goals as well. Patient self-reports ~40% of her normal self. Patient is seeing vascular MD in ~1-2 weeks. Patient is recommended to perform PT HEP regularly every day as tolerated as well as to follow-up with ortho MD as soon as possible. Treatment Diagnosis: Rt hip pain  Therapy Prognosis: Good    Post-Pain Assessment:       Pain Rating (0-10 pain scale):  \"same\" /10   Location and pain description same as pre-treatment unless indicated. Action: [] NA   [x] Perform HEP  [] Meds as prescribed  [x] Modalities as prescribed   [] Call Physician     GOALS   Patient Goal(s): Patient Goals : To walk normal    Short Term Goals Completed by 2 weeks Goal Status   STG 1 Independent with HEP Met     Long Term Goals Completed by 5 weeks Goal Status   LTG 1 Improve carlos LE strength to >/= 4+/5 to improve stability with standing and walking. Partially met   LTG 2 Reduce Rt hip pain to </= 2/10 with all activity.  Not Met   LTG 3 Pt will ambulate with LRD vs no AD throughout clinic with minimal to no deviations S/I. Not Met   LTG 4 LEFS >/= 49/80 to improve pt's QOL. Not Met       Plan:  Frequency/Duration:  Plan  Additional Comments: Discharge  Pt to continue current HEP. See objective section for any therapeutic exercise changes, additions or modifications this date.     Therapy Time:      PT Individual Minutes  Time In: 7662  Time Out: 1500  Minutes: 41  Timed Code Treatment Minutes: 41 Minutes  Procedure Minutes: 0 minutes  Timed Activity Minutes Units   Aquatics 41 3   Electronically signed by Maxine Cool PT on 12/6/22 at 3:03 PM EST

## 2022-12-08 ENCOUNTER — APPOINTMENT (OUTPATIENT)
Dept: PHYSICAL THERAPY | Age: 72
End: 2022-12-08
Payer: MEDICARE

## 2022-12-11 NOTE — FLOWSHEET NOTE
Assessment completed. Patient alert oriented and cooperative. Complains of pain to right hip, pain medications effective. Patient requests discharge home tomorrow 8/3/2022, physician aware. Denies any further needs or complaints at this time. English

## 2023-03-08 DIAGNOSIS — M54.9 BACK PAIN, UNSPECIFIED BACK LOCATION, UNSPECIFIED BACK PAIN LATERALITY, UNSPECIFIED CHRONICITY: Primary | ICD-10-CM

## 2023-03-08 DIAGNOSIS — F41.9 ANXIETY: ICD-10-CM

## 2023-03-08 RX ORDER — LORAZEPAM 1 MG/1
1 TABLET ORAL 3 TIMES DAILY
COMMUNITY
End: 2023-03-14

## 2023-03-08 RX ORDER — GABAPENTIN 300 MG/1
300 CAPSULE ORAL 3 TIMES DAILY
COMMUNITY
End: 2023-03-14

## 2023-03-11 RX ORDER — LORAZEPAM 1 MG/1
1 TABLET ORAL 3 TIMES DAILY
Qty: 90 TABLET | Refills: 0 | Status: CANCELLED | OUTPATIENT
Start: 2023-03-11

## 2023-03-11 RX ORDER — GABAPENTIN 300 MG/1
CAPSULE ORAL
Qty: 150 CAPSULE | Refills: 0 | Status: CANCELLED | OUTPATIENT
Start: 2023-03-11

## 2023-03-14 DIAGNOSIS — F41.9 ANXIETY DISORDER, UNSPECIFIED: ICD-10-CM

## 2023-03-14 DIAGNOSIS — M79.605 PAIN IN LEFT LEG: ICD-10-CM

## 2023-03-14 DIAGNOSIS — M54.50 LOW BACK PAIN, UNSPECIFIED: ICD-10-CM

## 2023-03-14 RX ORDER — GABAPENTIN 300 MG/1
CAPSULE ORAL
Qty: 150 CAPSULE | Refills: 0 | Status: SHIPPED | OUTPATIENT
Start: 2023-03-14 | End: 2023-03-24 | Stop reason: SINTOL

## 2023-03-14 RX ORDER — LORAZEPAM 1 MG/1
TABLET ORAL
Qty: 90 TABLET | Refills: 0 | Status: SHIPPED | OUTPATIENT
Start: 2023-03-14 | End: 2023-03-24 | Stop reason: SDUPTHER

## 2023-03-18 DIAGNOSIS — M79.605 PAIN IN LEFT LEG: ICD-10-CM

## 2023-03-18 DIAGNOSIS — F41.9 ANXIETY DISORDER, UNSPECIFIED: ICD-10-CM

## 2023-03-18 DIAGNOSIS — M54.50 LOW BACK PAIN, UNSPECIFIED: ICD-10-CM

## 2023-03-18 DIAGNOSIS — K21.9 GASTRO-ESOPHAGEAL REFLUX DISEASE WITHOUT ESOPHAGITIS: ICD-10-CM

## 2023-03-18 DIAGNOSIS — R60.9 EDEMA, UNSPECIFIED: ICD-10-CM

## 2023-03-21 RX ORDER — HYDROCHLOROTHIAZIDE 25 MG/1
TABLET ORAL
Qty: 30 TABLET | Refills: 0 | Status: SHIPPED | OUTPATIENT
Start: 2023-03-21 | End: 2023-04-19

## 2023-03-21 RX ORDER — LORAZEPAM 1 MG/1
TABLET ORAL
Qty: 90 TABLET | Refills: 0 | OUTPATIENT
Start: 2023-03-21

## 2023-03-21 RX ORDER — GABAPENTIN 300 MG/1
CAPSULE ORAL
Qty: 150 CAPSULE | Refills: 0 | OUTPATIENT
Start: 2023-03-21

## 2023-03-21 RX ORDER — PANTOPRAZOLE SODIUM 40 MG/1
TABLET, DELAYED RELEASE ORAL
Qty: 30 TABLET | Refills: 5 | Status: SHIPPED | OUTPATIENT
Start: 2023-03-21 | End: 2023-07-31 | Stop reason: ALTCHOICE

## 2023-03-22 PROBLEM — R73.9 HYPERGLYCEMIA: Status: ACTIVE | Noted: 2023-03-22

## 2023-03-22 PROBLEM — M79.605 LUMBAR PAIN WITH RADIATION DOWN LEFT LEG: Status: ACTIVE | Noted: 2023-03-22

## 2023-03-22 PROBLEM — R93.89: Status: ACTIVE | Noted: 2023-03-22

## 2023-03-22 PROBLEM — L03.032 PARONYCHIA OF TOE OF LEFT FOOT: Status: ACTIVE | Noted: 2023-03-22

## 2023-03-22 PROBLEM — Z96.649 STATUS POST TOTAL REPLACEMENT OF HIP: Status: ACTIVE | Noted: 2023-03-22

## 2023-03-22 PROBLEM — I73.9 PVD (PERIPHERAL VASCULAR DISEASE) (CMS-HCC): Status: ACTIVE | Noted: 2023-03-22

## 2023-03-22 PROBLEM — M51.369 OTHER INTERVERTEBRAL DISC DEGENERATION, LUMBAR REGION: Status: ACTIVE | Noted: 2023-03-22

## 2023-03-22 PROBLEM — J32.9 CHRONIC SINUSITIS: Status: ACTIVE | Noted: 2023-03-22

## 2023-03-22 PROBLEM — Z86.59 HISTORY OF ANXIETY: Status: ACTIVE | Noted: 2023-03-22

## 2023-03-22 PROBLEM — M70.70 HIP BURSITIS: Status: ACTIVE | Noted: 2023-03-22

## 2023-03-22 PROBLEM — F32.A DEPRESSION: Status: ACTIVE | Noted: 2023-03-22

## 2023-03-22 PROBLEM — M79.672 LEFT FOOT PAIN: Status: ACTIVE | Noted: 2023-03-22

## 2023-03-22 PROBLEM — F41.9 ANXIETY DISORDER: Status: ACTIVE | Noted: 2023-03-22

## 2023-03-22 PROBLEM — G40.909 SEIZURE DISORDER (MULTI): Status: ACTIVE | Noted: 2023-03-22

## 2023-03-22 PROBLEM — R60.9 EDEMA: Status: ACTIVE | Noted: 2023-03-22

## 2023-03-22 PROBLEM — F43.9 STRESS: Status: ACTIVE | Noted: 2023-03-22

## 2023-03-22 PROBLEM — E78.5 HYPERLIPIDEMIA: Status: ACTIVE | Noted: 2023-03-22

## 2023-03-22 PROBLEM — M81.0 OSTEOPOROSIS: Status: ACTIVE | Noted: 2023-03-22

## 2023-03-22 PROBLEM — K55.1 MESENTERIC ARTERY STENOSIS (MULTI): Status: ACTIVE | Noted: 2023-03-22

## 2023-03-22 PROBLEM — T63.441A BEE STING: Status: ACTIVE | Noted: 2023-03-22

## 2023-03-22 PROBLEM — M25.50 POLYARTHRALGIA: Status: ACTIVE | Noted: 2023-03-22

## 2023-03-22 PROBLEM — J34.89 NASAL CONGESTION WITH RHINORRHEA: Status: ACTIVE | Noted: 2023-03-22

## 2023-03-22 PROBLEM — Z79.2 PROPHYLACTIC ANTIBIOTIC: Status: ACTIVE | Noted: 2023-03-22

## 2023-03-22 PROBLEM — M54.16 LUMBAR RADICULAR PAIN: Status: ACTIVE | Noted: 2023-03-22

## 2023-03-22 PROBLEM — R41.3 MEMORY CHANGES: Status: ACTIVE | Noted: 2023-03-22

## 2023-03-22 PROBLEM — S80.812A ABRASION OF LEFT LEG: Status: ACTIVE | Noted: 2023-03-22

## 2023-03-22 PROBLEM — R93.89 ABNORMAL ANGIOGRAM: Status: ACTIVE | Noted: 2023-03-22

## 2023-03-22 PROBLEM — M51.36 OTHER INTERVERTEBRAL DISC DEGENERATION, LUMBAR REGION: Status: ACTIVE | Noted: 2023-03-22

## 2023-03-22 PROBLEM — M47.816 OSTEOARTHRITIS OF LUMBAR SPINE: Status: ACTIVE | Noted: 2023-03-22

## 2023-03-22 PROBLEM — B35.1 ONYCHOMYCOSIS: Status: ACTIVE | Noted: 2023-03-22

## 2023-03-22 PROBLEM — L65.9 HAIR LOSS: Status: ACTIVE | Noted: 2023-03-22

## 2023-03-22 PROBLEM — B37.9 YEAST INFECTION: Status: ACTIVE | Noted: 2023-03-22

## 2023-03-22 PROBLEM — M25.473 ANKLE EDEMA: Status: ACTIVE | Noted: 2023-03-22

## 2023-03-22 PROBLEM — N39.0 URINARY TRACT INFECTION: Status: ACTIVE | Noted: 2023-03-22

## 2023-03-22 PROBLEM — G62.9 PERIPHERAL NEUROPATHY: Status: ACTIVE | Noted: 2023-03-22

## 2023-03-22 PROBLEM — M16.11 PRIMARY OSTEOARTHRITIS OF RIGHT HIP: Status: ACTIVE | Noted: 2023-03-22

## 2023-03-22 PROBLEM — I65.23 BILATERAL CAROTID ARTERY STENOSIS: Status: ACTIVE | Noted: 2023-03-22

## 2023-03-22 PROBLEM — I77.3 FIBROMUSCULAR DYSPLASIA (CMS-HCC): Status: ACTIVE | Noted: 2023-03-22

## 2023-03-22 PROBLEM — R53.83 FATIGUE: Status: ACTIVE | Noted: 2023-03-22

## 2023-03-22 PROBLEM — K04.7 ABSCESSED TOOTH: Status: ACTIVE | Noted: 2023-03-22

## 2023-03-22 PROBLEM — M25.512 LEFT SHOULDER PAIN: Status: ACTIVE | Noted: 2023-03-22

## 2023-03-22 PROBLEM — J44.9 COPD, MILD (MULTI): Status: ACTIVE | Noted: 2023-03-22

## 2023-03-22 PROBLEM — R35.0 INCREASED FREQUENCY OF URINATION: Status: ACTIVE | Noted: 2023-03-22

## 2023-03-22 PROBLEM — G60.9 IDIOPATHIC PERIPHERAL NEUROPATHY: Status: ACTIVE | Noted: 2023-03-22

## 2023-03-22 PROBLEM — M25.561 KNEE PAIN, RIGHT: Status: ACTIVE | Noted: 2023-03-22

## 2023-03-22 PROBLEM — M54.50 LUMBAR PAIN WITH RADIATION DOWN LEFT LEG: Status: ACTIVE | Noted: 2023-03-22

## 2023-03-22 PROBLEM — M19.90 ARTHRITIS: Status: ACTIVE | Noted: 2023-03-22

## 2023-03-22 PROBLEM — L23.9 ALLERGIC DERMATITIS: Status: ACTIVE | Noted: 2023-03-22

## 2023-03-22 PROBLEM — R52 GENERALIZED PAIN: Status: ACTIVE | Noted: 2023-03-22

## 2023-03-22 PROBLEM — S73.004A: Status: ACTIVE | Noted: 2023-03-22

## 2023-03-22 PROBLEM — L03.116 CELLULITIS OF LEFT LOWER LEG: Status: ACTIVE | Noted: 2023-03-22

## 2023-03-22 PROBLEM — F17.200 NICOTINE DEPENDENCE: Status: ACTIVE | Noted: 2023-03-22

## 2023-03-22 PROBLEM — E55.9 VITAMIN D DEFICIENCY: Status: ACTIVE | Noted: 2023-03-22

## 2023-03-22 PROBLEM — F17.200 CURRENT EVERY DAY SMOKER: Status: ACTIVE | Noted: 2023-03-22

## 2023-03-22 PROBLEM — E03.9 ACQUIRED HYPOTHYROIDISM: Status: ACTIVE | Noted: 2023-03-22

## 2023-03-22 PROBLEM — K21.9 GERD (GASTROESOPHAGEAL REFLUX DISEASE): Status: ACTIVE | Noted: 2023-03-22

## 2023-03-22 PROBLEM — R30.0 DYSURIA: Status: ACTIVE | Noted: 2023-03-22

## 2023-03-22 PROBLEM — M53.86 DISORDER OF LUMBAR SPINE: Status: ACTIVE | Noted: 2023-03-22

## 2023-03-22 PROBLEM — J32.9 SINUSITIS: Status: ACTIVE | Noted: 2023-03-22

## 2023-03-22 PROBLEM — Z96.649 STATUS POST REVISION OF TOTAL HIP REPLACEMENT: Status: ACTIVE | Noted: 2023-03-22

## 2023-03-22 PROBLEM — I25.10 ATHEROSCLEROSIS OF CORONARY ARTERY: Status: ACTIVE | Noted: 2023-03-22

## 2023-03-22 PROBLEM — R09.81 NASAL CONGESTION WITH RHINORRHEA: Status: ACTIVE | Noted: 2023-03-22

## 2023-03-22 RX ORDER — CHOLECALCIFEROL (VITAMIN D3) 50 MCG
1 TABLET ORAL DAILY
COMMUNITY
Start: 2022-09-24 | End: 2023-08-04

## 2023-03-22 RX ORDER — DULOXETIN HYDROCHLORIDE 30 MG/1
1 CAPSULE, DELAYED RELEASE ORAL DAILY
COMMUNITY
Start: 2023-03-02 | End: 2023-03-24 | Stop reason: SINTOL

## 2023-03-22 RX ORDER — SEMAGLUTIDE 0.25 MG/.5ML
0.25 INJECTION, SOLUTION SUBCUTANEOUS
COMMUNITY
Start: 2023-01-03 | End: 2023-03-30

## 2023-03-22 RX ORDER — ASPIRIN 81 MG/1
1 TABLET ORAL
COMMUNITY
Start: 2020-09-03

## 2023-03-22 RX ORDER — L.ACID,FERM,PLA,RHA/B.BIF,LONG 126 MG
1 TABLET, DELAYED AND EXTENDED RELEASE ORAL DAILY
COMMUNITY
End: 2023-07-31 | Stop reason: ALTCHOICE

## 2023-03-22 RX ORDER — MIRTAZAPINE 15 MG/1
1 TABLET, FILM COATED ORAL NIGHTLY
COMMUNITY
Start: 2022-12-30 | End: 2023-03-30

## 2023-03-22 RX ORDER — LORATADINE 10 MG/1
1 TABLET ORAL DAILY
COMMUNITY
Start: 2021-06-30

## 2023-03-22 RX ORDER — ROSUVASTATIN CALCIUM 10 MG/1
1 TABLET, COATED ORAL NIGHTLY
COMMUNITY
Start: 2022-04-08 | End: 2023-03-24 | Stop reason: ALTCHOICE

## 2023-03-22 RX ORDER — TRAMADOL HYDROCHLORIDE 50 MG/1
TABLET ORAL
COMMUNITY
Start: 2022-08-10 | End: 2023-03-30 | Stop reason: ALTCHOICE

## 2023-03-22 RX ORDER — TERBINAFINE HYDROCHLORIDE 250 MG/1
1 TABLET ORAL DAILY
COMMUNITY
Start: 2022-06-09 | End: 2023-03-30 | Stop reason: ALTCHOICE

## 2023-03-22 RX ORDER — LANOLIN ALCOHOL/MO/W.PET/CERES
1000 CREAM (GRAM) TOPICAL DAILY
COMMUNITY
End: 2023-03-24 | Stop reason: SDUPTHER

## 2023-03-22 RX ORDER — IBANDRONATE SODIUM 150 MG/1
150 TABLET, FILM COATED ORAL
COMMUNITY
Start: 2021-12-02 | End: 2023-05-30 | Stop reason: SDUPTHER

## 2023-03-22 RX ORDER — NAPROXEN 500 MG/1
1 TABLET ORAL
COMMUNITY
Start: 2022-12-30 | End: 2023-06-30 | Stop reason: ALTCHOICE

## 2023-03-24 ENCOUNTER — OFFICE VISIT (OUTPATIENT)
Dept: PRIMARY CARE | Facility: CLINIC | Age: 73
End: 2023-03-24
Payer: MEDICARE

## 2023-03-24 VITALS
HEIGHT: 64 IN | BODY MASS INDEX: 30.77 KG/M2 | RESPIRATION RATE: 16 BRPM | OXYGEN SATURATION: 98 % | SYSTOLIC BLOOD PRESSURE: 110 MMHG | DIASTOLIC BLOOD PRESSURE: 60 MMHG | HEART RATE: 76 BPM | WEIGHT: 180.2 LBS

## 2023-03-24 DIAGNOSIS — Z12.11 ENCOUNTER FOR SCREENING COLONOSCOPY: ICD-10-CM

## 2023-03-24 DIAGNOSIS — G40.909 SEIZURE DISORDER (MULTI): Primary | ICD-10-CM

## 2023-03-24 DIAGNOSIS — I77.3 FIBROMUSCULAR DYSPLASIA (CMS-HCC): ICD-10-CM

## 2023-03-24 DIAGNOSIS — I73.9 PVD (PERIPHERAL VASCULAR DISEASE) (CMS-HCC): ICD-10-CM

## 2023-03-24 DIAGNOSIS — J44.9 COPD, MILD (MULTI): ICD-10-CM

## 2023-03-24 DIAGNOSIS — K55.1 MESENTERIC ARTERY STENOSIS (MULTI): ICD-10-CM

## 2023-03-24 PROBLEM — S73.004A: Status: RESOLVED | Noted: 2023-03-22 | Resolved: 2023-03-24

## 2023-03-24 PROCEDURE — 99214 OFFICE O/P EST MOD 30 MIN: CPT | Performed by: FAMILY MEDICINE

## 2023-03-24 RX ORDER — DICLOFENAC SODIUM 75 MG/1
1 TABLET, DELAYED RELEASE ORAL 2 TIMES DAILY
COMMUNITY
Start: 2022-12-27 | End: 2023-03-30 | Stop reason: ALTCHOICE

## 2023-03-24 RX ORDER — PREDNISONE 10 MG/1
TABLET ORAL
COMMUNITY
Start: 2022-10-21 | End: 2023-03-24 | Stop reason: ALTCHOICE

## 2023-03-24 RX ORDER — HYDROCODONE BITARTRATE AND ACETAMINOPHEN 5; 325 MG/1; MG/1
TABLET ORAL
COMMUNITY
Start: 2022-07-21 | End: 2023-03-30 | Stop reason: ALTCHOICE

## 2023-03-24 RX ORDER — ASPIRIN 325 MG
50000 TABLET, DELAYED RELEASE (ENTERIC COATED) ORAL WEEKLY
COMMUNITY
End: 2023-03-24 | Stop reason: SDUPTHER

## 2023-03-24 RX ORDER — TOPIRAMATE 25 MG/1
25 TABLET ORAL 2 TIMES DAILY
COMMUNITY
End: 2023-03-30 | Stop reason: ALTCHOICE

## 2023-03-24 RX ORDER — LORAZEPAM 1 MG/1
1 TABLET ORAL
COMMUNITY
End: 2023-04-15

## 2023-03-24 RX ORDER — LORAZEPAM 0.5 MG/1
TABLET ORAL
COMMUNITY
End: 2023-03-24 | Stop reason: SDUPTHER

## 2023-03-24 RX ORDER — NITROFURANTOIN 25; 75 MG/1; MG/1
CAPSULE ORAL
COMMUNITY
Start: 2023-02-02 | End: 2023-03-24 | Stop reason: ALTCHOICE

## 2023-03-24 RX ORDER — PHENTERMINE HYDROCHLORIDE 37.5 MG/1
1 TABLET ORAL DAILY
COMMUNITY
Start: 2022-12-02 | End: 2023-03-24 | Stop reason: ALTCHOICE

## 2023-03-24 RX ORDER — LANOLIN ALCOHOL/MO/W.PET/CERES
1 CREAM (GRAM) TOPICAL
COMMUNITY

## 2023-03-24 RX ORDER — ACETAMINOPHEN 500 MG
TABLET ORAL
COMMUNITY
End: 2023-03-24 | Stop reason: SDUPTHER

## 2023-03-24 RX ORDER — ASPIRIN 81 MG/1
1 TABLET ORAL 2 TIMES DAILY
COMMUNITY
Start: 2017-12-08 | End: 2023-03-24 | Stop reason: SDUPTHER

## 2023-03-24 RX ORDER — CHOLECALCIFEROL (VITAMIN D3) 25 MCG
1 TABLET ORAL DAILY
COMMUNITY
End: 2023-03-24 | Stop reason: SDUPTHER

## 2023-03-24 RX ORDER — PAROXETINE 10 MG/1
10 TABLET, FILM COATED ORAL DAILY
COMMUNITY
Start: 2022-12-15 | End: 2023-03-24 | Stop reason: SINTOL

## 2023-03-24 RX ORDER — AZELASTINE 1 MG/ML
1 SPRAY, METERED NASAL 2 TIMES DAILY
COMMUNITY

## 2023-03-24 RX ORDER — NITROGLYCERIN 0.4 MG/1
0.4 TABLET SUBLINGUAL
COMMUNITY
End: 2023-10-17 | Stop reason: ALTCHOICE

## 2023-03-24 RX ORDER — HYDROCHLOROTHIAZIDE 25 MG/1
1 TABLET ORAL
COMMUNITY
Start: 2022-02-28 | End: 2023-03-30 | Stop reason: ALTCHOICE

## 2023-03-24 RX ORDER — ASPIRIN 81 MG/1
162 TABLET ORAL
COMMUNITY
End: 2023-03-24 | Stop reason: SDUPTHER

## 2023-03-24 RX ORDER — GABAPENTIN 300 MG/1
300 CAPSULE ORAL 3 TIMES DAILY
COMMUNITY
Start: 2021-04-13 | End: 2023-03-24 | Stop reason: SINTOL

## 2023-03-24 NOTE — PROGRESS NOTES
Subjective   Patient ID: Vaishali Valentine is a 72 y.o. female who presents for Hip Pain (Patient following up on hip pain, she had hip replacement, patient wants bone density test printout Nov 2022. Patient claims her legs are feeling numb since the hip replacement July 28, 2022.).    HPI    Review of Systems  Constitutional: Fever, weight gain, weight loss, appetite change, night sweats, fatigue, chills.  Eyes : blurry, double vision, vision, loss, tearing, redness, pain, sensitivity to light, glaucoma.  Ears: nose, mouth, and throat: Hearing loss, ringing in the ears, ear pain, nasal congestion, nasal drainage, nosebleeds, mouth, throat, irritation tooth problem.  Cardiovascular: chest pain, pressure, heart racing, palpitations, sweating, leg swelling, high or low blood pressure  Pulmonary: Cough, yellow or green sputum, blood and sputum, shortness of breath, wheezing  Gastrointestinal: Nausea, vomiting, diarrhea, constipation, pain, blood in stool, or vomitus, heartburn, difficulty swallowing  Genitourinary: incontinence, abnormal bleeding, abnormal discharge, urinary frequency, urinary hesitancy, pain, impotence sexual problem, infection, urinary retention  Musculoskeletal: Pain, stiffness, joint, redness or warmth, arthritis, back pain, weakness, muscle wasting, sprain or fracture  Neuro: Weight weakness, dizziness, change in voice, change in taste change in vision, change in hearing, loss, or change of sensation, trouble walking, balance problems coordination problems, shaking, speech problem  Endocrine: cold or heat intolerance, blood sugar problem, weight gain or loss missed periods hot flashes, sweats, change in body hair, change in libido, increased thirst, increased urination  Heme/lymph: Swelling, bleeding, problem anemia, bruising, enlarged lymph nodes  Allergic/immunologic: H. plus nasal drip, watery itchy eyes, nasal drainage, immunosuppressed  The above were reviewed and noted negative except as  "noted in HPI and Problem List.     Objective   /60 (BP Location: Right arm, Patient Position: Sitting, BP Cuff Size: Adult)   Pulse 76   Resp 16   Ht 1.626 m (5' 4\")   Wt 81.7 kg (180 lb 3.2 oz)   SpO2 98%   BMI 30.93 kg/m²     Physical Exam  Constitutional: Well developed, well nourished, alert and in no acute distress   Eyes: Normal external exam. Pupils equally round and reactive to light with normal accommodation and extraocular movements intact.  Neck: Supple, no lymphadenopathy or masses.   Cardiovascular: Regular rate and rhythm, normal S1 and S2, no murmurs, gallops, or rubs. Radial pulses normal. No peripheral edema.  Pulmonary: No respiratory distress, lungs clear to auscultation bilaterally. No wheezes, rhonchi, rales.  Abdomen: soft,non tender, non distended, without masses or HSM  Skin: Warm, well perfused, normal skin turgor and color.   Neurologic: Cranial nerves II-XII grossly intact.   Psychiatric: Mood calm and affect normal  Musculoskeletal: Moving all extremities without restriction     Assessment/Plan   Problem List Items Addressed This Visit          Nervous    Seizure disorder (CMS/HCC) - Primary       Respiratory    COPD, mild (CMS/HCC)       Circulatory    Fibromuscular dysplasia (CMS/HCC)    Mesenteric artery stenosis (CMS/HCC)    PVD (peripheral vascular disease) (CMS/HCC)     Other Visit Diagnoses       Encounter for screening colonoscopy              start ibandronate 150 mg monthly    colonoscopy ordered     mammogram scheduled for March 31 2023 at [facility]    UA next     patient seeing Dr. Heath for back     ozempic too costly    Patient due for U/S of Carotid arteries in February, 2023.    Dr. Mcduffie recommends CTA in 6 months to 1 year to observe R ICA irregularity.     Patient also seeing vascular surgeon Dr. Aceves at [facility] for abdominal vascular disease    Patient also seeing Dr. Aceves at [facility] who is also a vascular surgeon who placed her Superior Mesenteric Artery " stent.    nails wnl

## 2023-03-30 ENCOUNTER — OFFICE VISIT (OUTPATIENT)
Dept: PRIMARY CARE | Facility: CLINIC | Age: 73
End: 2023-03-30
Payer: MEDICARE

## 2023-03-30 VITALS
WEIGHT: 177 LBS | RESPIRATION RATE: 16 BRPM | DIASTOLIC BLOOD PRESSURE: 78 MMHG | SYSTOLIC BLOOD PRESSURE: 112 MMHG | HEART RATE: 69 BPM | HEIGHT: 64 IN | OXYGEN SATURATION: 97 % | BODY MASS INDEX: 30.22 KG/M2

## 2023-03-30 DIAGNOSIS — F41.1 GENERALIZED ANXIETY DISORDER: ICD-10-CM

## 2023-03-30 DIAGNOSIS — E78.2 MIXED HYPERLIPIDEMIA: ICD-10-CM

## 2023-03-30 DIAGNOSIS — L03.312 CELLULITIS OF BACK: ICD-10-CM

## 2023-03-30 DIAGNOSIS — E03.9 ACQUIRED HYPOTHYROIDISM: ICD-10-CM

## 2023-03-30 DIAGNOSIS — G40.909 SEIZURE DISORDER (MULTI): Primary | ICD-10-CM

## 2023-03-30 DIAGNOSIS — M54.50 LUMBAR PAIN WITH RADIATION DOWN LEFT LEG: ICD-10-CM

## 2023-03-30 DIAGNOSIS — M47.26 OSTEOARTHRITIS OF SPINE WITH RADICULOPATHY, LUMBAR REGION: ICD-10-CM

## 2023-03-30 DIAGNOSIS — M79.605 LUMBAR PAIN WITH RADIATION DOWN LEFT LEG: ICD-10-CM

## 2023-03-30 PROCEDURE — 99214 OFFICE O/P EST MOD 30 MIN: CPT | Performed by: FAMILY MEDICINE

## 2023-03-30 RX ORDER — ESCITALOPRAM OXALATE 10 MG/1
5 TABLET ORAL
COMMUNITY
Start: 2022-03-16 | End: 2023-03-30 | Stop reason: ALTCHOICE

## 2023-03-30 RX ORDER — AMOXICILLIN 500 MG/1
500 CAPSULE ORAL 3 TIMES DAILY
Qty: 30 CAPSULE | Refills: 0 | Status: SHIPPED | OUTPATIENT
Start: 2023-03-30 | End: 2023-04-09

## 2023-03-30 NOTE — PROGRESS NOTES
Subjective   Patient ID: Vaishali Valentine is a 72 y.o. female who presents for Rash (Patient c/o red/ painful rash that is weeping on right side of back x 2 weeks.).    Rash  This is a new problem. The current episode started 1 to 4 weeks ago. The problem has been gradually worsening since onset. The affected locations include the back. The rash is characterized by pain, redness and draining. It is unknown if there was an exposure to a precipitant.        Review of Systems   Skin:  Positive for rash.   Constitutional: Fever, weight gain, weight loss, appetite change, night sweats, fatigue, chills.  Eyes : blurry, double vision, vision, loss, tearing, redness, pain, sensitivity to light, glaucoma.  Ears: nose, mouth, and throat: Hearing loss, ringing in the ears, ear pain, nasal congestion, nasal drainage, nosebleeds, mouth, throat, irritation tooth problem.  Cardiovascular: chest pain, pressure, heart racing, palpitations, sweating, leg swelling, high or low blood pressure  Pulmonary: Cough, yellow or green sputum, blood and sputum, shortness of breath, wheezing  Gastrointestinal: Nausea, vomiting, diarrhea, constipation, pain, blood in stool, or vomitus, heartburn, difficulty swallowing  Genitourinary: incontinence, abnormal bleeding, abnormal discharge, urinary frequency, urinary hesitancy, pain, impotence sexual problem, infection, urinary retention  Musculoskeletal: Pain, stiffness, joint, redness or warmth, arthritis, back pain, weakness, muscle wasting, sprain or fracture  Neuro: Weight weakness, dizziness, change in voice, change in taste change in vision, change in hearing, loss, or change of sensation, trouble walking, balance problems coordination problems, shaking, speech problem  Endocrine: cold or heat intolerance, blood sugar problem, weight gain or loss missed periods hot flashes, sweats, change in body hair, change in libido, increased thirst, increased urination  Heme/lymph: Swelling, bleeding,  "problem anemia, bruising, enlarged lymph nodes  Allergic/immunologic: H. plus nasal drip, watery itchy eyes, nasal drainage, immunosuppressed  The above were reviewed and noted negative except as noted in HPI and Problem List.     Objective   /78   Pulse 69   Resp 16   Ht 1.626 m (5' 4\")   Wt 80.3 kg (177 lb)   SpO2 97%   BMI 30.38 kg/m²     Physical Exam  Constitutional: Well developed, well nourished, alert and in no acute distress   Eyes: Normal external exam. Pupils equally round and reactive to light with normal accommodation and extraocular movements intact.  Neck: Supple, no lymphadenopathy or masses.   Cardiovascular: Regular rate and rhythm, normal S1 and S2, no murmurs, gallops, or rubs. Radial pulses normal. No peripheral edema.  Pulmonary: No respiratory distress, lungs clear to auscultation bilaterally. No wheezes, rhonchi, rales.  Abdomen: soft,non tender, non distended, without masses or HSM  Skin: Warm, well perfused, normal skin turgor and color.   Neurologic: Cranial nerves II-XII grossly intact.   Psychiatric: Mood calm and affect normal  Musculoskeletal: Moving all extremities without restriction   Assessment/Plan   Problem List Items Addressed This Visit          Nervous    Seizure disorder (CMS/HCC) - Primary       Musculoskeletal    Osteoarthritis of lumbar spine       Endocrine/Metabolic    Acquired hypothyroidism       Infectious/Inflammatory    Cellulitis of back       Other    Anxiety disorder    Hyperlipidemia    Lumbar pain with radiation down left leg        Patient advised of weight reducing program to include low-fat diet, moderate exercise program to include 150 minutes/week. Discussed risks of CAD and DM with obesity. Follow up as noted in chart     I have personally reviewed the [facility] report with the patient and have considered the risk of abuse, addiction, dependence and diversion.     Patient's use of medication is allowing patient to be able to perform  ADL's. " Patient is always being evaluated for the possibility of lowering the medication dosage.    Begin taking Amoxicillin.     continue all current medications and therapy for chronic medical conditions    start ibandronate 150 mg monthly     colonoscopy 04/06/23 at [facility]     Low back injections with Dr. Fry [facility] 04/14/23     mammogram scheduled for March 31 2023 at [facility] next      patient seeing Dr. Heath for back      ozempic too costly     Patient due for U/S of Carotid arteries in February, 2023.     Dr. Mcduffie recommends CTA in 6 months to 1 year to observe R ICA irregularity.      Patient also seeing vascular surgeon Dr. Aceves at [facility] for abdominal vascular disease     Patient also seeing Dr. Aceves at [facility] who is also a vascular surgeon who placed her Superior Mesenteric Artery stent.     nails wnl    Scribe Attestation  By signing my name below, I, ASTRID Avalos Scribe   attest that this documentation has been prepared under the direction and in the presence of Oli Hutchins MD.    Provider Attestation - Scribe documentation    All medical record entries made by the Scribe were at my direction and personally dictated by me. I have reviewed the chart and agree that the record accurately reflects my personal performance of the history, physical exam, discussion and plan.

## 2023-04-05 ENCOUNTER — TELEPHONE (OUTPATIENT)
Dept: PRIMARY CARE | Facility: CLINIC | Age: 73
End: 2023-04-05

## 2023-04-10 ENCOUNTER — APPOINTMENT (OUTPATIENT)
Dept: PRIMARY CARE | Facility: CLINIC | Age: 73
End: 2023-04-10
Payer: MEDICARE

## 2023-04-12 DIAGNOSIS — M79.605 PAIN IN LEFT LEG: ICD-10-CM

## 2023-04-12 DIAGNOSIS — F41.9 ANXIETY DISORDER, UNSPECIFIED: ICD-10-CM

## 2023-04-12 DIAGNOSIS — M54.50 LOW BACK PAIN, UNSPECIFIED: ICD-10-CM

## 2023-04-15 RX ORDER — GABAPENTIN 300 MG/1
CAPSULE ORAL
Qty: 150 CAPSULE | Refills: 0 | Status: SHIPPED | OUTPATIENT
Start: 2023-04-15 | End: 2023-10-17 | Stop reason: SDUPTHER

## 2023-04-15 RX ORDER — LORAZEPAM 1 MG/1
TABLET ORAL
Qty: 90 TABLET | Refills: 0 | Status: SHIPPED | OUTPATIENT
Start: 2023-04-15 | End: 2023-05-30 | Stop reason: SDUPTHER

## 2023-04-16 DIAGNOSIS — M79.605 PAIN IN LEFT LEG: ICD-10-CM

## 2023-04-16 DIAGNOSIS — R60.9 EDEMA, UNSPECIFIED: ICD-10-CM

## 2023-04-16 DIAGNOSIS — F41.9 ANXIETY DISORDER, UNSPECIFIED: ICD-10-CM

## 2023-04-16 DIAGNOSIS — M54.50 LOW BACK PAIN, UNSPECIFIED: ICD-10-CM

## 2023-04-19 RX ORDER — LORAZEPAM 1 MG/1
TABLET ORAL
Qty: 90 TABLET | Refills: 0 | OUTPATIENT
Start: 2023-04-19

## 2023-04-19 RX ORDER — GABAPENTIN 300 MG/1
CAPSULE ORAL
Qty: 150 CAPSULE | Refills: 0 | OUTPATIENT
Start: 2023-04-19

## 2023-04-19 RX ORDER — HYDROCHLOROTHIAZIDE 25 MG/1
TABLET ORAL
Qty: 30 TABLET | Refills: 3 | Status: SHIPPED | OUTPATIENT
Start: 2023-04-19 | End: 2023-08-04

## 2023-04-24 ENCOUNTER — APPOINTMENT (OUTPATIENT)
Dept: PRIMARY CARE | Facility: CLINIC | Age: 73
End: 2023-04-24
Payer: MEDICARE

## 2023-05-30 ENCOUNTER — OFFICE VISIT (OUTPATIENT)
Dept: PRIMARY CARE | Facility: CLINIC | Age: 73
End: 2023-05-30
Payer: MEDICARE

## 2023-05-30 VITALS
DIASTOLIC BLOOD PRESSURE: 64 MMHG | BODY MASS INDEX: 29.35 KG/M2 | RESPIRATION RATE: 16 BRPM | HEART RATE: 61 BPM | WEIGHT: 171 LBS | OXYGEN SATURATION: 99 % | SYSTOLIC BLOOD PRESSURE: 122 MMHG

## 2023-05-30 DIAGNOSIS — E03.9 ACQUIRED HYPOTHYROIDISM: ICD-10-CM

## 2023-05-30 DIAGNOSIS — M47.26 OSTEOARTHRITIS OF SPINE WITH RADICULOPATHY, LUMBAR REGION: Primary | ICD-10-CM

## 2023-05-30 DIAGNOSIS — I77.3 FIBROMUSCULAR DYSPLASIA (CMS-HCC): ICD-10-CM

## 2023-05-30 DIAGNOSIS — L03.312 CELLULITIS OF BACK: ICD-10-CM

## 2023-05-30 DIAGNOSIS — I73.9 PVD (PERIPHERAL VASCULAR DISEASE) (CMS-HCC): ICD-10-CM

## 2023-05-30 DIAGNOSIS — M54.50 LUMBAR PAIN WITH RADIATION DOWN LEFT LEG: ICD-10-CM

## 2023-05-30 DIAGNOSIS — Z12.11 ENCOUNTER FOR SCREENING COLONOSCOPY: ICD-10-CM

## 2023-05-30 DIAGNOSIS — F41.1 GENERALIZED ANXIETY DISORDER: ICD-10-CM

## 2023-05-30 DIAGNOSIS — K55.1 MESENTERIC ARTERY STENOSIS (MULTI): ICD-10-CM

## 2023-05-30 DIAGNOSIS — J44.9 COPD, MILD (MULTI): ICD-10-CM

## 2023-05-30 DIAGNOSIS — M79.605 LUMBAR PAIN WITH RADIATION DOWN LEFT LEG: ICD-10-CM

## 2023-05-30 DIAGNOSIS — M81.0 AGE RELATED OSTEOPOROSIS, UNSPECIFIED PATHOLOGICAL FRACTURE PRESENCE: ICD-10-CM

## 2023-05-30 DIAGNOSIS — G40.909 SEIZURE DISORDER (MULTI): ICD-10-CM

## 2023-05-30 DIAGNOSIS — E78.2 MIXED HYPERLIPIDEMIA: ICD-10-CM

## 2023-05-30 DIAGNOSIS — F41.9 ANXIETY DISORDER, UNSPECIFIED: ICD-10-CM

## 2023-05-30 PROBLEM — M47.817 FACET ARTHROPATHY, LUMBOSACRAL: Status: ACTIVE | Noted: 2019-12-16

## 2023-05-30 PROBLEM — N30.00 ACUTE CYSTITIS WITHOUT HEMATURIA: Status: ACTIVE | Noted: 2020-07-21

## 2023-05-30 PROBLEM — G43.909 MIGRAINE: Status: ACTIVE | Noted: 2018-09-11

## 2023-05-30 PROBLEM — R23.3 EASY BRUISING: Status: ACTIVE | Noted: 2020-07-21

## 2023-05-30 PROBLEM — F17.200 SMOKER: Status: ACTIVE | Noted: 2020-07-21

## 2023-05-30 PROBLEM — M17.12 OSTEOARTHRITIS OF LEFT KNEE: Status: ACTIVE | Noted: 2017-11-08

## 2023-05-30 PROBLEM — H25.813 COMBINED FORM OF AGE-RELATED CATARACT, BOTH EYES: Status: ACTIVE | Noted: 2022-04-20

## 2023-05-30 PROCEDURE — 1160F RVW MEDS BY RX/DR IN RCRD: CPT | Performed by: FAMILY MEDICINE

## 2023-05-30 PROCEDURE — 99214 OFFICE O/P EST MOD 30 MIN: CPT | Performed by: FAMILY MEDICINE

## 2023-05-30 PROCEDURE — 1036F TOBACCO NON-USER: CPT | Performed by: FAMILY MEDICINE

## 2023-05-30 PROCEDURE — 1159F MED LIST DOCD IN RCRD: CPT | Performed by: FAMILY MEDICINE

## 2023-05-30 PROCEDURE — 3008F BODY MASS INDEX DOCD: CPT | Performed by: FAMILY MEDICINE

## 2023-05-30 PROCEDURE — 1157F ADVNC CARE PLAN IN RCRD: CPT | Performed by: FAMILY MEDICINE

## 2023-05-30 RX ORDER — IBANDRONATE SODIUM 150 MG/1
150 TABLET, FILM COATED ORAL
Qty: 3 TABLET | Refills: 1 | Status: SHIPPED | OUTPATIENT
Start: 2023-05-30 | End: 2023-10-17 | Stop reason: ALTCHOICE

## 2023-05-30 RX ORDER — PHENTERMINE HYDROCHLORIDE 37.5 MG/1
37.5 TABLET ORAL
Qty: 30 TABLET | Refills: 0 | Status: SHIPPED | OUTPATIENT
Start: 2023-05-30 | End: 2023-06-30 | Stop reason: ALTCHOICE

## 2023-05-30 RX ORDER — ROSUVASTATIN CALCIUM 10 MG/1
10 TABLET, COATED ORAL DAILY
COMMUNITY
Start: 2023-04-12 | End: 2023-06-30 | Stop reason: ALTCHOICE

## 2023-05-30 RX ORDER — LORAZEPAM 1 MG/1
1 TABLET ORAL 3 TIMES DAILY
Qty: 90 TABLET | Refills: 0 | Status: SHIPPED | OUTPATIENT
Start: 2023-05-30 | End: 2023-06-30 | Stop reason: SDUPTHER

## 2023-05-30 ASSESSMENT — ENCOUNTER SYMPTOMS
DECREASED CONCENTRATION: 0
SHORTNESS OF BREATH: 0
CONSTIPATION: 0
AGITATION: 0
BLOOD IN STOOL: 0
CONFUSION: 0
EYE PAIN: 0
ABDOMINAL DISTENTION: 0
SORE THROAT: 0
OCCASIONAL FEELINGS OF UNSTEADINESS: 0
RECTAL PAIN: 0
CONSTITUTIONAL NEGATIVE: 1
NECK STIFFNESS: 0
FATIGUE: 0
APPETITE CHANGE: 0
DIARRHEA: 0
DIZZINESS: 0
MYALGIAS: 0
DEPRESSION: 1
POLYDIPSIA: 0
FEVER: 0
ADENOPATHY: 0
SEIZURES: 0
TROUBLE SWALLOWING: 0
DYSURIA: 0
SINUS PAIN: 0
CHEST TIGHTNESS: 0
RHINORRHEA: 0
SPEECH DIFFICULTY: 0
SINUS PRESSURE: 0
DYSPHORIC MOOD: 0
SLEEP DISTURBANCE: 0
COLOR CHANGE: 0
ARTHRALGIAS: 1
NERVOUS/ANXIOUS: 0
HEADACHES: 0
ACTIVITY CHANGE: 0
ABDOMINAL PAIN: 0
HEMATURIA: 0
FLANK PAIN: 0
BACK PAIN: 1
LOSS OF SENSATION IN FEET: 0
POLYPHAGIA: 0
COUGH: 0
STRIDOR: 0
PALPITATIONS: 0
PHOTOPHOBIA: 0

## 2023-05-30 NOTE — PROGRESS NOTES
Subjective   Patient ID: Vaishali Valentine is a 72 y.o. female who presents for Follow-up.    Pt present with hospital visit due to frequent voiding. (Friday)  Pt states she has back problem where her spine is pushing in her bladder.     Medication refill.     U/A done Friday and MRI     Would like to discuss a pain medication due to sever back pain.          Review of Systems   Constitutional: Negative.  Negative for activity change, appetite change, fatigue and fever.   HENT:  Negative for congestion, dental problem, ear discharge, ear pain, mouth sores, rhinorrhea, sinus pressure, sinus pain, sore throat, tinnitus and trouble swallowing.    Eyes:  Negative for photophobia, pain and visual disturbance.   Respiratory:  Negative for cough, chest tightness, shortness of breath and stridor.    Cardiovascular:  Negative for chest pain and palpitations.   Gastrointestinal:  Negative for abdominal distention, abdominal pain, blood in stool, constipation, diarrhea and rectal pain.   Endocrine: Negative for cold intolerance, heat intolerance, polydipsia, polyphagia and polyuria.   Genitourinary:  Negative for dysuria, flank pain, hematuria and urgency.   Musculoskeletal:  Positive for arthralgias and back pain. Negative for gait problem, myalgias and neck stiffness.   Skin:  Negative for color change and rash.   Allergic/Immunologic: Negative for environmental allergies and food allergies.   Neurological:  Negative for dizziness, seizures, syncope, speech difficulty and headaches.   Hematological:  Negative for adenopathy.   Psychiatric/Behavioral:  Negative for agitation, confusion, decreased concentration, dysphoric mood and sleep disturbance. The patient is not nervous/anxious.        Objective   /64   Pulse 61   Resp 16   Wt 77.6 kg (171 lb)   SpO2 99%   BMI 29.35 kg/m²     Physical Exam  Vitals reviewed.   Constitutional:       General: She is not in acute distress.     Appearance: Normal appearance. She is  normal weight. She is not ill-appearing or diaphoretic.   HENT:      Head: Normocephalic.      Right Ear: Tympanic membrane and external ear normal.      Left Ear: Tympanic membrane and external ear normal.      Nose: Nose normal. No congestion.      Mouth/Throat:      Mouth: Mucous membranes are dry.      Pharynx: No posterior oropharyngeal erythema.   Eyes:      General:         Right eye: No discharge.         Left eye: No discharge.      Extraocular Movements: Extraocular movements intact.      Conjunctiva/sclera: Conjunctivae normal.      Pupils: Pupils are equal, round, and reactive to light.   Cardiovascular:      Rate and Rhythm: Normal rate and regular rhythm.      Pulses: Normal pulses.      Heart sounds: Normal heart sounds. No murmur heard.  Pulmonary:      Effort: Pulmonary effort is normal. No respiratory distress.      Breath sounds: Normal breath sounds. No wheezing or rales.   Chest:      Chest wall: No tenderness.   Abdominal:      General: Abdomen is flat. Bowel sounds are normal. There is no distension.      Palpations: There is no mass.      Tenderness: There is no abdominal tenderness. There is no guarding.   Genitourinary:     Rectum: Normal.   Musculoskeletal:         General: No tenderness. Normal range of motion.      Cervical back: Normal range of motion and neck supple. No tenderness.      Right lower leg: No edema.      Left lower leg: No edema.   Skin:     General: Skin is warm and dry.      Coloration: Skin is not jaundiced.      Findings: No bruising or erythema.   Neurological:      General: No focal deficit present.      Mental Status: She is alert and oriented to person, place, and time. Mental status is at baseline.      Cranial Nerves: No cranial nerve deficit.      Sensory: No sensory deficit.      Coordination: Coordination normal.      Gait: Gait normal.   Psychiatric:         Mood and Affect: Mood normal.         Thought Content: Thought content normal.         Judgment:  Judgment normal.         Assessment/Plan   Problem List Items Addressed This Visit          Nervous    Seizure disorder (CMS/HCC)       Respiratory    COPD, mild (CMS/HCC)       Circulatory    Fibromuscular dysplasia (CMS/HCC)    Mesenteric artery stenosis (CMS/HCC)    PVD (peripheral vascular disease) (CMS/HCC)       Musculoskeletal    Osteoporosis    Relevant Medications    ibandronate (Boniva) 150 mg tablet    Osteoarthritis of lumbar spine - Primary    Relevant Orders    Referral to Neurosurgery    Follow Up In [facility] - PCP       Endocrine/Metabolic    Acquired hypothyroidism       Infectious/Inflammatory    Cellulitis of back       Other    Anxiety disorder    Hyperlipidemia    Lumbar pain with radiation down left leg    Relevant Orders    Referral to Neurosurgery    Follow Up In [facility] - PCP     Other Visit Diagnoses       Encounter for screening colonoscopy        Anxiety disorder, unspecified        Relevant Medications    LORazepam (Ativan) 1 mg tablet    Other Relevant Orders    Follow Up In [facility] - PCP    BMI 29.0-29.9,adult        Relevant Medications    phentermine (Adipex-P) 37.5 mg tablet              Scribe Attestation  By signing my name below, IElysia RMA , Scribe   attest that this documentation has been prepared under the direction and in the presence of Oli Hutchins MD.   Provider Attestation - Scribe documentation    All medical record entries made by the Scribe were at my direction and personally dictated by me. I have reviewed the chart and agree that the record accurately reflects my personal performance of the history, physical exam, discussion and plan.    Follow up in 4 WEEKS    Continue current medications and therapy for chronic medical conditions.    Patient was advised importance of proper diet/nutrition in addition adequate hydration. Patient was encouraged moderate exercise program to include 30 minutes daily for 5  days of the week or 150 minutes weekly. Patient will follow-up with us as scheduled.    I personally reviewed the OARRS report for this patient. I have considered the risks of abuse, dependence, addiction, and diversion.    UDS/CSA: 02/02/2023    EGD in future    REFERRED TO NEUROSURGERY DR. RENNY FREY     colonoscopy 04/06/23 at [facility]      Low back injections with Dr. Fry [facility] 04/14/23     mammogram scheduled for March 31 2023 at [facility]     Dr. Mcduffie recommends CTA in 6 months to 1 year to observe R ICA irregularity.      Patient also seeing vascular surgeon Dr. Aceves at [facility] for abdominal vascular disease     Patient also seeing Dr. Aceves at [facility] who is also a vascular surgeon who placed her Superior Mesenteric Artery stent.

## 2023-06-30 ENCOUNTER — OFFICE VISIT (OUTPATIENT)
Dept: PRIMARY CARE | Facility: CLINIC | Age: 73
End: 2023-06-30
Payer: MEDICARE

## 2023-06-30 VITALS
BODY MASS INDEX: 28.34 KG/M2 | HEIGHT: 64 IN | SYSTOLIC BLOOD PRESSURE: 118 MMHG | OXYGEN SATURATION: 100 % | WEIGHT: 166 LBS | HEART RATE: 77 BPM | DIASTOLIC BLOOD PRESSURE: 80 MMHG | RESPIRATION RATE: 16 BRPM

## 2023-06-30 DIAGNOSIS — J30.1 SEASONAL ALLERGIC RHINITIS DUE TO POLLEN: Primary | ICD-10-CM

## 2023-06-30 DIAGNOSIS — F06.4 ANXIETY DISORDER DUE TO KNOWN PHYSIOLOGICAL CONDITION: ICD-10-CM

## 2023-06-30 DIAGNOSIS — K21.00 GASTROESOPHAGEAL REFLUX DISEASE WITH ESOPHAGITIS WITHOUT HEMORRHAGE: ICD-10-CM

## 2023-06-30 DIAGNOSIS — K55.1 MESENTERIC ARTERY STENOSIS (MULTI): ICD-10-CM

## 2023-06-30 DIAGNOSIS — F43.9 STRESS: ICD-10-CM

## 2023-06-30 DIAGNOSIS — M79.605 LUMBAR PAIN WITH RADIATION DOWN LEFT LEG: ICD-10-CM

## 2023-06-30 DIAGNOSIS — E03.9 ACQUIRED HYPOTHYROIDISM: ICD-10-CM

## 2023-06-30 DIAGNOSIS — M47.26 OSTEOARTHRITIS OF SPINE WITH RADICULOPATHY, LUMBAR REGION: ICD-10-CM

## 2023-06-30 DIAGNOSIS — E78.5 DYSLIPIDEMIA: ICD-10-CM

## 2023-06-30 DIAGNOSIS — F41.9 ANXIETY DISORDER, UNSPECIFIED: ICD-10-CM

## 2023-06-30 DIAGNOSIS — I73.9 PVD (PERIPHERAL VASCULAR DISEASE) (CMS-HCC): ICD-10-CM

## 2023-06-30 DIAGNOSIS — K55.1 CHRONIC MESENTERIC ISCHEMIA (MULTI): ICD-10-CM

## 2023-06-30 DIAGNOSIS — M54.50 LUMBAR PAIN WITH RADIATION DOWN LEFT LEG: ICD-10-CM

## 2023-06-30 PROCEDURE — 99214 OFFICE O/P EST MOD 30 MIN: CPT | Performed by: FAMILY MEDICINE

## 2023-06-30 RX ORDER — LORAZEPAM 1 MG/1
1 TABLET ORAL 3 TIMES DAILY
Qty: 90 TABLET | Refills: 0 | Status: SHIPPED | OUTPATIENT
Start: 2023-06-30 | End: 2023-08-01 | Stop reason: SDUPTHER

## 2023-06-30 NOTE — PROGRESS NOTES
Subjective   Patient ID: Vaishali Valentine is a 72 y.o. female who presents for Osteoarthritis, COPD, Hyperlipidemia, and Anxiety.    Pt is here for one month follow up.    Hyperlipidemia  This is a recurrent problem. The problem is controlled.   Anxiety  Presents for follow-up visit. Symptoms occur constantly.            Review of Systems  12 Systems have been reviewed as follows.  Constitutional: Fever, weight gain, weight loss, appetite change, night sweats, fatigue, chills.  Eyes : blurry, double vision, vision, loss, tearing, redness, pain, sensitivity to light, glaucoma.  Ears, nose, mouth, and throat: Hearing loss, ringing in the ears, ear pain, nasal congestion, nasal drainage, nosebleeds, mouth, throat, irritation tooth problem.  Cardiovascular :chest pain, pressure, heart racing, palpitations, sweating, leg swelling, high or low blood pressure  Pulmonary: Cough, yellow or green sputum, blood and sputum, shortness of breath, wheezing  Gastrointestinal: Nausea, vomiting, diarrhea, constipation, pain, blood in stool, or vomitus, heartburn, difficulty swallowing  Genitourinary: incontinence, abnormal bleeding, abnormal discharge, urinary frequency, urinary hesitancy, pain, impotence sexual problem, infection, urinary retention  Musculoskeletal: Pain, stiffness, joint, redness or warmth, arthritis, back pain, weakness, muscle wasting, sprain or fracture  Neuro: Weight weakness, dizziness, change in voice, change in taste change in vision, change in hearing, loss, or change of sensation, trouble walking, balance problems coordination problems, shaking, speech problem  Endocrine , cold or heat intolerance, blood sugar problem, weight gain or loss missed periods hot flashes, sweats, change in body hair, change in libido, increased thirst, increased urination  Heme/lymph: Swelling, bleeding, problem anemia, bruising, enlarged lymph nodes  Allergic/immunologic: H. plus nasal drip, watery itchy eyes, nasal drainage,  "immunosuppressed  The above were reviewed and noted negative except as noted in HPI and Problem List.    Objective   /80 (BP Location: Right arm, Patient Position: Sitting, BP Cuff Size: Small adult)   Pulse 77   Resp 16   Ht 1.626 m (5' 4\")   Wt 75.3 kg (166 lb)   SpO2 100%   BMI 28.49 kg/m²     Physical Exam  Constitutional: Well developed, well nourished, alert and in no acute distress   Eyes: Normal external exam. Pupils equally round and reactive to light with normal accommodation and extraocular movements intact.  Neck: Supple, no lymphadenopathy or masses.   Cardiovascular: Regular rate and rhythm, normal S1 and S2, no murmurs, gallops, or rubs. Radial pulses normal. No peripheral edema.  Pulmonary: No respiratory distress, lungs clear to auscultation bilaterally. No wheezes, rhonchi, rales.  Abdomen: soft,non tender, non distended, without masses or HSM  Skin: Warm, well perfused, normal skin turgor and color.   Neurologic: Cranial nerves II-XII grossly intact.   Psychiatric: Mood calm and affect normal  Musculoskeletal: Moving all extremities without restriction    Assessment/Plan   Problem List Items Addressed This Visit       Acquired hypothyroidism    Relevant Orders    Follow Up In [facility] - PCP - Established    Anxiety disorder    Relevant Orders    Follow Up In [facility] - PCP - Established    GERD (gastroesophageal reflux disease)    Relevant Orders    Follow Up In [facility] - PCP - Established    Lumbar pain with radiation down left leg    Relevant Orders    Follow Up In [facility] - PCP - Established    Mesenteric artery stenosis (CMS/HCC)    Relevant Orders    Follow Up In [facility] - PCP - Established    PVD (peripheral vascular disease) (CMS/[facility])    Relevant Orders    Follow Up In [facility] - PCP - Established    Stress    Relevant Orders    Follow Up In [facility] - PCP - Established    " Osteoarthritis of lumbar spine    Relevant Orders    Follow Up In [facility] - PCP - Established    Chronic mesenteric ischemia (CMS/HCC)    Relevant Orders    Follow Up In [facility] - PCP - Established    Dyslipidemia    Relevant Orders    Follow Up In [facility] - PCP - Established    Allergic rhinitis - Primary    Relevant Orders    Follow Up In [facility] - PCP - Established     Other Visit Diagnoses       Anxiety disorder, unspecified        Relevant Medications    LORazepam (Ativan) 1 mg tablet    Other Relevant Orders    Follow Up In [facility] - PCP - Established    Follow Up In [facility] - PCP - Established          See  note 5/30/23    Continue current medications and therapy for chronic medical conditions

## 2023-07-31 ENCOUNTER — OFFICE VISIT (OUTPATIENT)
Dept: PRIMARY CARE | Facility: CLINIC | Age: 73
End: 2023-07-31
Payer: MEDICARE

## 2023-07-31 VITALS
HEIGHT: 64 IN | DIASTOLIC BLOOD PRESSURE: 64 MMHG | SYSTOLIC BLOOD PRESSURE: 122 MMHG | RESPIRATION RATE: 15 BRPM | HEART RATE: 73 BPM | WEIGHT: 168 LBS | OXYGEN SATURATION: 97 % | BODY MASS INDEX: 28.68 KG/M2

## 2023-07-31 DIAGNOSIS — E55.9 VITAMIN D DEFICIENCY: ICD-10-CM

## 2023-07-31 DIAGNOSIS — F41.9 ANXIETY DISORDER, UNSPECIFIED: Primary | ICD-10-CM

## 2023-07-31 DIAGNOSIS — E78.2 MIXED HYPERLIPIDEMIA: ICD-10-CM

## 2023-07-31 DIAGNOSIS — R10.9 ACUTE LEFT FLANK PAIN: ICD-10-CM

## 2023-07-31 DIAGNOSIS — R53.83 OTHER FATIGUE: ICD-10-CM

## 2023-07-31 PROCEDURE — 99214 OFFICE O/P EST MOD 30 MIN: CPT | Performed by: FAMILY MEDICINE

## 2023-07-31 PROCEDURE — 1159F MED LIST DOCD IN RCRD: CPT | Performed by: FAMILY MEDICINE

## 2023-07-31 PROCEDURE — 1036F TOBACCO NON-USER: CPT | Performed by: FAMILY MEDICINE

## 2023-07-31 PROCEDURE — 1160F RVW MEDS BY RX/DR IN RCRD: CPT | Performed by: FAMILY MEDICINE

## 2023-07-31 PROCEDURE — 1157F ADVNC CARE PLAN IN RCRD: CPT | Performed by: FAMILY MEDICINE

## 2023-07-31 PROCEDURE — 3008F BODY MASS INDEX DOCD: CPT | Performed by: FAMILY MEDICINE

## 2023-07-31 RX ORDER — LORAZEPAM 1 MG/1
1 TABLET ORAL 2 TIMES DAILY
Qty: 60 TABLET | Refills: 2 | Status: CANCELLED | OUTPATIENT
Start: 2023-07-31

## 2023-07-31 RX ORDER — TROSPIUM CHLORIDE 20 MG/1
20 TABLET, FILM COATED ORAL 2 TIMES DAILY
COMMUNITY
Start: 2023-07-20

## 2023-07-31 ASSESSMENT — ENCOUNTER SYMPTOMS
PHOTOPHOBIA: 0
COUGH: 0
ABDOMINAL PAIN: 0
POLYPHAGIA: 0
SHORTNESS OF BREATH: 0
HEMATURIA: 0
EYE PAIN: 0
BLOOD IN STOOL: 0
DIZZINESS: 0
ABDOMINAL DISTENTION: 0
DYSURIA: 0
APPETITE CHANGE: 0
SLEEP DISTURBANCE: 0
MYALGIAS: 0
CHEST TIGHTNESS: 0
FLANK PAIN: 0
NERVOUS/ANXIOUS: 0
DYSPHORIC MOOD: 0
SPEECH DIFFICULTY: 0
TROUBLE SWALLOWING: 0
ARTHRALGIAS: 0
DIARRHEA: 0
COLOR CHANGE: 0
PALPITATIONS: 0
CONSTIPATION: 0
FATIGUE: 0
HEADACHES: 0
ADENOPATHY: 0
RHINORRHEA: 0
DECREASED CONCENTRATION: 0
ACTIVITY CHANGE: 0
SORE THROAT: 0
POLYDIPSIA: 0
SEIZURES: 0
NECK STIFFNESS: 0
FEVER: 0
SINUS PRESSURE: 0
CONFUSION: 0
RECTAL PAIN: 0
CONSTITUTIONAL NEGATIVE: 1
SINUS PAIN: 0
AGITATION: 0
STRIDOR: 0

## 2023-07-31 NOTE — PROGRESS NOTES
Subjective   Patient ID: Vaishali Valentine is a 72 y.o. female who presents for Anxiety and Back Pain.    Anxiety  Patient reports no chest pain, confusion, decreased concentration, dizziness, nervous/anxious behavior, palpitations or shortness of breath.        She states having left back pain. She is given steroids injection by pain management. She states having a lost bladder control, given trospium 20mg. She is concern to use and would like to discus. She is having continue pain, with sharp pain from under arm pit to mid flank.     She will to see Dr. Diaz on Friday for review on right hip. She is having left leg shooting pain.     She would like to have a blood work.    She has spots on right tight. It will not heal x 1 month.    Patient is following up on anxiety and use of Ativan.  She will need a refill.    Review of Systems   Constitutional: Negative.  Negative for activity change, appetite change, fatigue and fever.   HENT:  Negative for congestion, dental problem, ear discharge, ear pain, mouth sores, rhinorrhea, sinus pressure, sinus pain, sore throat, tinnitus and trouble swallowing.    Eyes:  Negative for photophobia, pain and visual disturbance.   Respiratory:  Negative for cough, chest tightness, shortness of breath and stridor.    Cardiovascular:  Negative for chest pain and palpitations.   Gastrointestinal:  Negative for abdominal distention, abdominal pain, blood in stool, constipation, diarrhea and rectal pain.   Endocrine: Negative for cold intolerance, heat intolerance, polydipsia, polyphagia and polyuria.   Genitourinary:  Negative for dysuria, flank pain, hematuria and urgency.   Musculoskeletal:  Negative for arthralgias, gait problem, myalgias and neck stiffness.   Skin:  Negative for color change and rash.   Allergic/Immunologic: Negative for environmental allergies and food allergies.   Neurological:  Negative for dizziness, seizures, syncope, speech difficulty and headaches.  "  Hematological:  Negative for adenopathy.   Psychiatric/Behavioral:  Negative for agitation, confusion, decreased concentration, dysphoric mood and sleep disturbance. The patient is not nervous/anxious.        Objective   /64 (BP Location: Right arm, Patient Position: Sitting, BP Cuff Size: Adult)   Pulse 73   Resp 15   Ht 1.626 m (5' 4\")   Wt 76.2 kg (168 lb)   SpO2 97%   BMI 28.84 kg/m²     Physical Exam  Vitals reviewed.   Constitutional:       General: She is not in acute distress.     Appearance: Normal appearance. She is normal weight. She is not ill-appearing or diaphoretic.   HENT:      Head: Normocephalic.      Right Ear: Tympanic membrane and external ear normal.      Left Ear: Tympanic membrane and external ear normal.      Nose: Nose normal. No congestion.      Mouth/Throat:      Pharynx: No posterior oropharyngeal erythema.   Eyes:      General:         Right eye: No discharge.         Left eye: No discharge.      Extraocular Movements: Extraocular movements intact.      Conjunctiva/sclera: Conjunctivae normal.      Pupils: Pupils are equal, round, and reactive to light.   Cardiovascular:      Rate and Rhythm: Normal rate and regular rhythm.      Pulses: Normal pulses.      Heart sounds: Normal heart sounds. No murmur heard.  Pulmonary:      Effort: Pulmonary effort is normal. No respiratory distress.      Breath sounds: Normal breath sounds. No wheezing or rales.   Chest:      Chest wall: No tenderness.   Abdominal:      General: Abdomen is flat. Bowel sounds are normal. There is no distension.      Palpations: There is no mass.      Tenderness: There is no abdominal tenderness. There is no guarding.   Musculoskeletal:         General: No tenderness. Normal range of motion.      Cervical back: Normal range of motion and neck supple. No tenderness.      Right lower leg: No edema.      Left lower leg: No edema.   Skin:     General: Skin is warm and dry.      Coloration: Skin is not " jaundiced.      Findings: No bruising or erythema.   Neurological:      General: No focal deficit present.      Mental Status: She is alert and oriented to person, place, and time. Mental status is at baseline.      Cranial Nerves: No cranial nerve deficit.      Sensory: No sensory deficit.      Coordination: Coordination normal.      Gait: Gait normal.   Psychiatric:         Mood and Affect: Mood normal.         Thought Content: Thought content normal.         Judgment: Judgment normal.         Assessment/Plan   Problem List Items Addressed This Visit       Fatigue    Relevant Orders    Thyroid Stimulating Hormone    Sedimentation Rate    Hyperlipidemia    Relevant Orders    Lipid Panel    Vitamin D deficiency    Relevant Orders    Vitamin D 25-Hydroxy,Total     Other Visit Diagnoses       Anxiety disorder, unspecified    -  Primary    Acute left flank pain               Scribe Attestation  By signing my name below, IOdalis , Scribelle   attest that this documentation has been prepared under the direction and in the presence of Daniel Solano DO.  Provider Attestation - Scribe documentation  All medical record entries made by the Scribe were at my direction and personally dictated by me. I have reviewed the chart and agree that the record accurately reflects my personal performance of the history, physical exam, discussion and plan.

## 2023-07-31 NOTE — PATIENT INSTRUCTIONS
Follow up in 4 weeks    Continue current medications and therapy for chronic medical conditions.    Patient was advised importance of proper diet/nutrition in addition adequate hydration. Patient was encouraged moderate exercise program to include 30 minutes daily for 5 days of the week or 150 minutes weekly. Patient will follow-up with us as scheduled.    I personally reviewed the OARRS report for this patient. I have considered the risks of abuse, dependence, addiction, and diversion.    UDS/CSA 02/02/2023    Lipid, TSH, Sed Rate and Vit D ordered    Obtain left rib series

## 2023-08-02 DIAGNOSIS — R60.9 EDEMA, UNSPECIFIED: ICD-10-CM

## 2023-08-02 DIAGNOSIS — E55.9 VITAMIN D DEFICIENCY, UNSPECIFIED: ICD-10-CM

## 2023-08-04 DIAGNOSIS — R07.81 RIB PAIN: ICD-10-CM

## 2023-08-04 DIAGNOSIS — R07.81 RIB PAIN ON RIGHT SIDE: ICD-10-CM

## 2023-08-04 DIAGNOSIS — R07.81 RIB PAIN ON LEFT SIDE: ICD-10-CM

## 2023-08-04 RX ORDER — HYDROCHLOROTHIAZIDE 25 MG/1
TABLET ORAL
Qty: 90 TABLET | Refills: 1 | Status: SHIPPED | OUTPATIENT
Start: 2023-08-04 | End: 2023-08-24 | Stop reason: SDUPTHER

## 2023-08-04 RX ORDER — OMEGA-3S/DHA/EPA/FISH OIL/D3 300MG-1000
50 CAPSULE ORAL DAILY
Qty: 90 TABLET | Refills: 1 | Status: SHIPPED | OUTPATIENT
Start: 2023-08-04

## 2023-08-05 RX ORDER — LORAZEPAM 1 MG/1
1 TABLET ORAL 3 TIMES DAILY
Qty: 90 TABLET | Refills: 2 | Status: SHIPPED | OUTPATIENT
Start: 2023-08-05 | End: 2023-10-17 | Stop reason: SDUPTHER

## 2023-08-12 ENCOUNTER — LAB (OUTPATIENT)
Dept: LAB | Facility: LAB | Age: 73
End: 2023-08-12
Payer: MEDICARE

## 2023-08-12 DIAGNOSIS — R53.83 OTHER FATIGUE: ICD-10-CM

## 2023-08-12 DIAGNOSIS — E55.9 VITAMIN D DEFICIENCY: ICD-10-CM

## 2023-08-12 DIAGNOSIS — E78.2 MIXED HYPERLIPIDEMIA: ICD-10-CM

## 2023-08-12 LAB
CALCIDIOL (25 OH VITAMIN D3) (NG/ML) IN SER/PLAS: 22 NG/ML
CHOLESTEROL (MG/DL) IN SER/PLAS: 231 MG/DL (ref 0–199)
CHOLESTEROL IN HDL (MG/DL) IN SER/PLAS: 58 MG/DL
CHOLESTEROL/HDL RATIO: 4
LDL: 159 MG/DL (ref 0–99)
SEDIMENTATION RATE, ERYTHROCYTE: 8 MM/H (ref 0–30)
THYROTROPIN (MIU/L) IN SER/PLAS BY DETECTION LIMIT <= 0.05 MIU/L: 1.5 MIU/L (ref 0.44–3.98)
TRIGLYCERIDE (MG/DL) IN SER/PLAS: 72 MG/DL (ref 0–149)
VLDL: 14 MG/DL (ref 0–40)

## 2023-08-12 PROCEDURE — 36415 COLL VENOUS BLD VENIPUNCTURE: CPT

## 2023-08-12 PROCEDURE — 84443 ASSAY THYROID STIM HORMONE: CPT

## 2023-08-12 PROCEDURE — 80061 LIPID PANEL: CPT

## 2023-08-12 PROCEDURE — 82306 VITAMIN D 25 HYDROXY: CPT

## 2023-08-12 PROCEDURE — 85652 RBC SED RATE AUTOMATED: CPT

## 2023-08-24 DIAGNOSIS — R60.9 EDEMA, UNSPECIFIED: Primary | ICD-10-CM

## 2023-08-24 RX ORDER — HYDROCHLOROTHIAZIDE 25 MG/1
TABLET ORAL
Qty: 90 TABLET | Refills: 1 | Status: SHIPPED | OUTPATIENT
Start: 2023-08-24

## 2023-10-13 ENCOUNTER — OFFICE VISIT (OUTPATIENT)
Dept: ORTHOPEDIC SURGERY | Facility: CLINIC | Age: 73
End: 2023-10-13
Payer: MEDICARE

## 2023-10-13 ENCOUNTER — CLINICAL SUPPORT (OUTPATIENT)
Dept: ORTHOPEDIC SURGERY | Facility: CLINIC | Age: 73
End: 2023-10-13
Payer: MEDICARE

## 2023-10-13 VITALS — BODY MASS INDEX: 28.17 KG/M2 | WEIGHT: 165 LBS | HEIGHT: 64 IN

## 2023-10-13 DIAGNOSIS — Z96.649 STATUS POST REVISION OF TOTAL HIP REPLACEMENT: ICD-10-CM

## 2023-10-13 DIAGNOSIS — M70.61 TROCHANTERIC BURSITIS OF RIGHT HIP: Primary | ICD-10-CM

## 2023-10-13 PROCEDURE — 99214 OFFICE O/P EST MOD 30 MIN: CPT | Performed by: ORTHOPAEDIC SURGERY

## 2023-10-13 PROCEDURE — 1159F MED LIST DOCD IN RCRD: CPT | Performed by: ORTHOPAEDIC SURGERY

## 2023-10-13 PROCEDURE — 3008F BODY MASS INDEX DOCD: CPT | Performed by: ORTHOPAEDIC SURGERY

## 2023-10-13 PROCEDURE — 1036F TOBACCO NON-USER: CPT | Performed by: ORTHOPAEDIC SURGERY

## 2023-10-13 PROCEDURE — 20610 DRAIN/INJ JOINT/BURSA W/O US: CPT | Performed by: ORTHOPAEDIC SURGERY

## 2023-10-13 PROCEDURE — 1126F AMNT PAIN NOTED NONE PRSNT: CPT | Performed by: ORTHOPAEDIC SURGERY

## 2023-10-13 PROCEDURE — 1160F RVW MEDS BY RX/DR IN RCRD: CPT | Performed by: ORTHOPAEDIC SURGERY

## 2023-10-13 PROCEDURE — 73502 X-RAY EXAM HIP UNI 2-3 VIEWS: CPT | Mod: RIGHT SIDE | Performed by: ORTHOPAEDIC SURGERY

## 2023-10-13 RX ORDER — LIDOCAINE HYDROCHLORIDE 10 MG/ML
2 INJECTION INFILTRATION; PERINEURAL ONCE
Status: COMPLETED | OUTPATIENT
Start: 2023-10-13 | End: 2023-10-13

## 2023-10-13 RX ORDER — BETAMETHASONE SODIUM PHOSPHATE AND BETAMETHASONE ACETATE 3; 3 MG/ML; MG/ML
6 INJECTION, SUSPENSION INTRA-ARTICULAR; INTRALESIONAL; INTRAMUSCULAR; SOFT TISSUE ONCE
Status: COMPLETED | OUTPATIENT
Start: 2023-10-13 | End: 2023-10-13

## 2023-10-13 RX ADMIN — BETAMETHASONE SODIUM PHOSPHATE AND BETAMETHASONE ACETATE 6 MG: 3; 3 INJECTION, SUSPENSION INTRA-ARTICULAR; INTRALESIONAL; INTRAMUSCULAR; SOFT TISSUE at 11:14

## 2023-10-13 RX ADMIN — LIDOCAINE HYDROCHLORIDE 20 MG: 10 INJECTION INFILTRATION; PERINEURAL at 11:15

## 2023-10-13 ASSESSMENT — PAIN SCALES - GENERAL: PAINLEVEL_OUTOF10: 0 - NO PAIN

## 2023-10-13 ASSESSMENT — PAIN - FUNCTIONAL ASSESSMENT: PAIN_FUNCTIONAL_ASSESSMENT: 0-10

## 2023-10-14 NOTE — PROGRESS NOTES
History of present illness    73-year-old female here for follow-up of her right hip revision surgery was July 2022.  She states she is got multiple issues she is got a problem with her back and she is having pain in both legs she is also having severe pain along the lateral side of her right hip denies any fevers or chills denies any redness swelling or drainage.  She had multiple Questions about the implants that were used in whether they had nickel in them as she has an allergy to nickel.  We had a long conversation regarding this.  She was concerned that her symptoms in her left leg may be related to some sort of nickel reaction and I am not sure if ever heard of symptoms on the other leg related to that I did explain that sometimes her local hypersensitivity type reactions that can occur but she does not have any evidence of anything like that and at the time of surgery and a revision circumstance we have limited options regarding implants      Past medical , Surgical, Family and social history reviewed.      Physical exam  General: No acute distress and breathing comfortably.  Patient is pleasant and cooperative with the examination.    Extremity  Good range of motion of the hip.  Flexion abduction external rotation maneuver is negative.  Moderate tenderness to palpation along the lateral side of the hip.  Pain with cross leg abduction.  Neurologically intact distally with full range of motion of the knee and ankle.  Good muscle strength distally with good sensation.  Compartments are soft calf is nontender.    Diagnostics      XR hip right 2 or 3 views    Result Date: 10/13/2023  Interpreted By:  Jackie Diaz, STUDY: XR HIP RIGHT 2 OR 3 VIEWS;  ;  10/13/2023 10:53 am   INDICATION: Signs/Symptoms:s/p TH-Revision.   ACCESSION NUMBER(S): [id number]   ORDERING CLINICIAN: JACKIE DIAZ   FINDINGS: Two views show patient status post total hip replacement in good alignment.  No signs  of fracture dislocation or other bony abnormality       Signed by: Franko Diaz 10/13/2023 10:57 AM Dictation workstation:   [id number]       Procedure  Before aspiration/injection, the risks  of this procedure including but not limited to;  infection, local skin irritation, skin atrophy, calcification, continued pain or discomfort, elevated blood sugar, burning, failure to relieve pain, possible late infection were all discussed with the patient.  The patient verbalized understanding and consented to the procedure.   After informed consent was provided, patient identification was confirmed, and allergies were verified, the patient was appropriately positioned. The site was marked and time-out performed.  The injection site was prepped in the usual sterile manner to provide a sterile environment. The skin was anesthetized with ethyl chloride spray. The aspiration/injection was performed with standard technique. The needle was withdrawn and the puncture site was secured with a Band-Aid. The patient tolerated the procedure well without complication.   Post-procedure discomfort can be alleviated with additional medication, ice, elevation, and rest over the first 24 hours as recommended.  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  The natural history of the condition and its associated treatment alternatives including surgical and nonsurgical options were discussed with the patient at length.  2.  Think most of her issues on her right side anyways are related to hip bursitis and I recommended a cortisone injection which was performed today without complication.  Patient understands the cortisone may provide temporary relief how much it helps her how long it lasts is unpredictable.  Cortisone can be repeated after 3 months if symptoms return.  3.  We had a long conversation regarding  nickel allergy and theories related to that and I will think any of her issues are likely related to any sort of nickel allergy she has had discussions with the implant  as well.  I think her symptoms on her left side are more likely related to her back as she is got severe issues with her back and she is trying avoid any type of back surgery.  She is can follow back up with me in 4 to 6 weeks sooner if there is increased pain or discomfort.  4.  All of the patient's questions were answered.    This note was prepared using voice recognition software.  The details of this note are correct and have been reviewed, and corrected to the best of my ability.  Some grammatical areas may persist related to the Dragon software    Franko Diaz MD  Senior Attending Physician  [facility]  [facility] [place]    [phone]

## 2023-10-17 ENCOUNTER — SPECIALTY PHARMACY (OUTPATIENT)
Dept: PHARMACY | Facility: CLINIC | Age: 73
End: 2023-10-17

## 2023-10-17 ENCOUNTER — OFFICE VISIT (OUTPATIENT)
Dept: PRIMARY CARE | Facility: CLINIC | Age: 73
End: 2023-10-17
Payer: MEDICARE

## 2023-10-17 ENCOUNTER — PHARMACY VISIT (OUTPATIENT)
Dept: PHARMACY | Facility: CLINIC | Age: 73
End: 2023-10-17
Payer: MEDICARE

## 2023-10-17 VITALS
SYSTOLIC BLOOD PRESSURE: 130 MMHG | OXYGEN SATURATION: 99 % | BODY MASS INDEX: 28.07 KG/M2 | HEART RATE: 61 BPM | WEIGHT: 164.4 LBS | RESPIRATION RATE: 15 BRPM | HEIGHT: 64 IN | DIASTOLIC BLOOD PRESSURE: 80 MMHG

## 2023-10-17 DIAGNOSIS — M54.50 LOW BACK PAIN, UNSPECIFIED: ICD-10-CM

## 2023-10-17 DIAGNOSIS — Z71.89 ACP (ADVANCE CARE PLANNING): ICD-10-CM

## 2023-10-17 DIAGNOSIS — M81.0 AGE RELATED OSTEOPOROSIS, UNSPECIFIED PATHOLOGICAL FRACTURE PRESENCE: ICD-10-CM

## 2023-10-17 DIAGNOSIS — Z96.649 PRESENCE OF ARTIFICIAL HIP, UNSPECIFIED LATERALITY: ICD-10-CM

## 2023-10-17 DIAGNOSIS — Z79.899 MEDICATION MANAGEMENT: ICD-10-CM

## 2023-10-17 DIAGNOSIS — M79.605 PAIN IN LEFT LEG: ICD-10-CM

## 2023-10-17 DIAGNOSIS — E78.2 MIXED HYPERLIPIDEMIA: ICD-10-CM

## 2023-10-17 DIAGNOSIS — Z91.013 SHELLFISH ALLERGY: ICD-10-CM

## 2023-10-17 DIAGNOSIS — E55.9 VITAMIN D DEFICIENCY: ICD-10-CM

## 2023-10-17 DIAGNOSIS — Z00.00 ROUTINE GENERAL MEDICAL EXAMINATION AT HEALTH CARE FACILITY: ICD-10-CM

## 2023-10-17 DIAGNOSIS — Z00.00 ENCOUNTER FOR MEDICARE ANNUAL WELLNESS EXAM: Primary | ICD-10-CM

## 2023-10-17 DIAGNOSIS — F41.9 ANXIETY DISORDER, UNSPECIFIED: ICD-10-CM

## 2023-10-17 DIAGNOSIS — Z23 NEED FOR INFLUENZA VACCINATION: ICD-10-CM

## 2023-10-17 PROBLEM — L03.312 CELLULITIS OF BACK: Status: RESOLVED | Noted: 2023-03-30 | Resolved: 2023-10-17

## 2023-10-17 PROBLEM — R93.89 ABNORMAL ANGIOGRAM: Status: RESOLVED | Noted: 2023-03-22 | Resolved: 2023-10-17

## 2023-10-17 PROBLEM — R52 GENERALIZED PAIN: Status: RESOLVED | Noted: 2023-03-22 | Resolved: 2023-10-17

## 2023-10-17 PROBLEM — M79.672 LEFT FOOT PAIN: Status: RESOLVED | Noted: 2023-03-22 | Resolved: 2023-10-17

## 2023-10-17 PROBLEM — M25.473 ANKLE EDEMA: Status: RESOLVED | Noted: 2023-03-22 | Resolved: 2023-10-17

## 2023-10-17 PROBLEM — N30.00 ACUTE CYSTITIS WITHOUT HEMATURIA: Status: RESOLVED | Noted: 2020-07-21 | Resolved: 2023-10-17

## 2023-10-17 PROBLEM — R30.0 DYSURIA: Status: RESOLVED | Noted: 2023-03-22 | Resolved: 2023-10-17

## 2023-10-17 PROBLEM — B37.9 YEAST INFECTION: Status: RESOLVED | Noted: 2023-03-22 | Resolved: 2023-10-17

## 2023-10-17 PROBLEM — M19.90 ARTHRITIS: Status: RESOLVED | Noted: 2023-03-22 | Resolved: 2023-10-17

## 2023-10-17 PROBLEM — B35.1 ONYCHOMYCOSIS: Status: RESOLVED | Noted: 2023-03-22 | Resolved: 2023-10-17

## 2023-10-17 PROBLEM — R73.9 HYPERGLYCEMIA: Status: RESOLVED | Noted: 2023-03-22 | Resolved: 2023-10-17

## 2023-10-17 PROBLEM — L03.032 PARONYCHIA OF TOE OF LEFT FOOT: Status: RESOLVED | Noted: 2023-03-22 | Resolved: 2023-10-17

## 2023-10-17 PROBLEM — F17.200 NICOTINE DEPENDENCE: Status: RESOLVED | Noted: 2023-03-22 | Resolved: 2023-10-17

## 2023-10-17 PROBLEM — M53.86 DISORDER OF LUMBAR SPINE: Status: RESOLVED | Noted: 2023-03-22 | Resolved: 2023-10-17

## 2023-10-17 PROBLEM — J34.89 NASAL CONGESTION WITH RHINORRHEA: Status: RESOLVED | Noted: 2023-03-22 | Resolved: 2023-10-17

## 2023-10-17 PROBLEM — R35.0 INCREASED FREQUENCY OF URINATION: Status: RESOLVED | Noted: 2023-03-22 | Resolved: 2023-10-17

## 2023-10-17 PROBLEM — M70.70 HIP BURSITIS: Status: RESOLVED | Noted: 2023-03-22 | Resolved: 2023-10-17

## 2023-10-17 PROBLEM — L03.116 CELLULITIS OF LEFT LOWER LEG: Status: RESOLVED | Noted: 2023-03-22 | Resolved: 2023-10-17

## 2023-10-17 PROBLEM — J32.9 SINUSITIS: Status: RESOLVED | Noted: 2023-03-22 | Resolved: 2023-10-17

## 2023-10-17 PROBLEM — F17.200 SMOKER: Status: RESOLVED | Noted: 2020-07-21 | Resolved: 2023-10-17

## 2023-10-17 PROBLEM — K04.7 ABSCESSED TOOTH: Status: RESOLVED | Noted: 2023-03-22 | Resolved: 2023-10-17

## 2023-10-17 PROBLEM — R23.3 EASY BRUISING: Status: RESOLVED | Noted: 2020-07-21 | Resolved: 2023-10-17

## 2023-10-17 PROBLEM — T63.441A BEE STING: Status: RESOLVED | Noted: 2023-03-22 | Resolved: 2023-10-17

## 2023-10-17 PROBLEM — L23.9 ALLERGIC DERMATITIS: Status: RESOLVED | Noted: 2023-03-22 | Resolved: 2023-10-17

## 2023-10-17 PROBLEM — Z86.59 HISTORY OF ANXIETY: Status: RESOLVED | Noted: 2023-03-22 | Resolved: 2023-10-17

## 2023-10-17 PROBLEM — R09.81 NASAL CONGESTION WITH RHINORRHEA: Status: RESOLVED | Noted: 2023-03-22 | Resolved: 2023-10-17

## 2023-10-17 PROBLEM — R60.9 EDEMA: Status: RESOLVED | Noted: 2023-03-22 | Resolved: 2023-10-17

## 2023-10-17 PROBLEM — N39.0 URINARY TRACT INFECTION: Status: RESOLVED | Noted: 2023-03-22 | Resolved: 2023-10-17

## 2023-10-17 PROBLEM — S80.812A ABRASION OF LEFT LEG: Status: RESOLVED | Noted: 2023-03-22 | Resolved: 2023-10-17

## 2023-10-17 PROCEDURE — 99497 ADVNCD CARE PLAN 30 MIN: CPT | Performed by: FAMILY MEDICINE

## 2023-10-17 PROCEDURE — 80307 DRUG TEST PRSMV CHEM ANLYZR: CPT

## 2023-10-17 PROCEDURE — G0446 INTENS BEHAVE THER CARDIO DX: HCPCS | Performed by: FAMILY MEDICINE

## 2023-10-17 PROCEDURE — G0439 PPPS, SUBSEQ VISIT: HCPCS | Performed by: FAMILY MEDICINE

## 2023-10-17 PROCEDURE — 90662 IIV NO PRSV INCREASED AG IM: CPT | Performed by: FAMILY MEDICINE

## 2023-10-17 PROCEDURE — 82570 ASSAY OF URINE CREATININE: CPT

## 2023-10-17 PROCEDURE — G0008 ADMIN INFLUENZA VIRUS VAC: HCPCS | Performed by: FAMILY MEDICINE

## 2023-10-17 PROCEDURE — 80361 OPIATES 1 OR MORE: CPT

## 2023-10-17 PROCEDURE — 99397 PER PM REEVAL EST PAT 65+ YR: CPT | Performed by: FAMILY MEDICINE

## 2023-10-17 RX ORDER — ATORVASTATIN CALCIUM 10 MG/1
10 TABLET, FILM COATED ORAL DAILY
Qty: 90 TABLET | Refills: 1 | Status: SHIPPED | OUTPATIENT
Start: 2023-10-17 | End: 2024-04-14

## 2023-10-17 RX ORDER — LORAZEPAM 1 MG/1
1 TABLET ORAL 3 TIMES DAILY
Qty: 90 TABLET | Refills: 2 | Status: SHIPPED | OUTPATIENT
Start: 2023-10-17 | End: 2024-02-14

## 2023-10-17 RX ORDER — DENOSUMAB 60 MG/ML
60 INJECTION SUBCUTANEOUS
Qty: 1 EACH | Refills: 1 | Status: SHIPPED | OUTPATIENT
Start: 2023-10-17

## 2023-10-17 RX ORDER — GABAPENTIN 300 MG/1
600 CAPSULE ORAL NIGHTLY
Qty: 60 CAPSULE | Refills: 3 | Status: SHIPPED | OUTPATIENT
Start: 2023-10-17 | End: 2023-10-18

## 2023-10-17 RX ORDER — ERGOCALCIFEROL 1.25 MG/1
50000 CAPSULE ORAL
Qty: 12 CAPSULE | Refills: 1 | Status: SHIPPED | OUTPATIENT
Start: 2023-10-17

## 2023-10-17 RX ORDER — EPINEPHRINE 0.3 MG/.3ML
1 INJECTION SUBCUTANEOUS AS NEEDED
Qty: 2 EACH | Refills: 1 | Status: SHIPPED | OUTPATIENT
Start: 2023-10-17

## 2023-10-17 RX ORDER — GABAPENTIN 300 MG/1
300 CAPSULE ORAL SEE ADMIN INSTRUCTIONS
Qty: 150 CAPSULE | Refills: 0 | Status: CANCELLED | OUTPATIENT
Start: 2023-10-17

## 2023-10-17 ASSESSMENT — ENCOUNTER SYMPTOMS
CONSTITUTIONAL NEGATIVE: 1
COUGH: 0
SPEECH DIFFICULTY: 0
SLEEP DISTURBANCE: 0
COLOR CHANGE: 0
FEVER: 0
BLOOD IN STOOL: 0
POLYDIPSIA: 0
AGITATION: 0
SINUS PAIN: 0
HEADACHES: 0
SEIZURES: 0
NERVOUS/ANXIOUS: 0
PALPITATIONS: 0
NECK STIFFNESS: 0
LOSS OF SENSATION IN FEET: 0
RECTAL PAIN: 0
CONFUSION: 0
POLYPHAGIA: 0
MYALGIAS: 0
PHOTOPHOBIA: 0
CHEST TIGHTNESS: 0
SINUS PRESSURE: 0
OCCASIONAL FEELINGS OF UNSTEADINESS: 0
DECREASED CONCENTRATION: 0
STRIDOR: 0
SORE THROAT: 0
ABDOMINAL PAIN: 0
EYE PAIN: 0
ADENOPATHY: 0
RHINORRHEA: 0
DIARRHEA: 0
DEPRESSION: 0
APPETITE CHANGE: 0
DYSPHORIC MOOD: 0
CONSTIPATION: 0
ARTHRALGIAS: 0
FLANK PAIN: 0
FATIGUE: 0
ABDOMINAL DISTENTION: 0
SHORTNESS OF BREATH: 0
HEMATURIA: 0
DIZZINESS: 0
TROUBLE SWALLOWING: 0
DYSURIA: 0
ACTIVITY CHANGE: 0

## 2023-10-17 ASSESSMENT — PATIENT HEALTH QUESTIONNAIRE - PHQ9
2. FEELING DOWN, DEPRESSED OR HOPELESS: NOT AT ALL
1. LITTLE INTEREST OR PLEASURE IN DOING THINGS: NOT AT ALL
SUM OF ALL RESPONSES TO PHQ9 QUESTIONS 1 AND 2: 0

## 2023-10-17 ASSESSMENT — ACTIVITIES OF DAILY LIVING (ADL)
TAKING_MEDICATION: INDEPENDENT
DRESSING: INDEPENDENT
GROCERY_SHOPPING: INDEPENDENT
BATHING: INDEPENDENT
DOING_HOUSEWORK: INDEPENDENT
MANAGING_FINANCES: INDEPENDENT

## 2023-10-17 NOTE — PROGRESS NOTES
Subjective   Reason for Visit: Vaishali Valentine is an 73 y.o. female here for a Medicare Wellness visit.     Past Medical, Surgical, and Family History reviewed and updated in chart.    Reviewed all medications by prescribing practitioner or clinical pharmacist (such as prescriptions, OTCs, herbal therapies and supplements) and documented in the medical record.    Patient is here for medicare annual wellness visit.    Patient would like discuss blood work done on 08/12/2023 and 08/29/2023.    Patient would like discuss Crestor prescription, she states that the medication do not work for her.    Patient would like discuss send the EpiPen prescription at [facility] pharmacy to a program for free medication.    Patient states have a bulkamind for urethral leaks, patient would like have a note in her chart that she only can used a small catheter when if necessary.     Patient would like discuss pneumonia shot.        Patient Care Team:  Daniel Solano DO as PCP - General (Family Medicine)  Oli Hutchins MD as PCP [facility]ributed [facility]     Review of Systems   Constitutional: Negative.  Negative for activity change, appetite change, fatigue and fever.   HENT:  Negative for congestion, dental problem, ear discharge, ear pain, mouth sores, rhinorrhea, sinus pressure, sinus pain, sore throat, tinnitus and trouble swallowing.    Eyes:  Negative for photophobia, pain and visual disturbance.   Respiratory:  Negative for cough, chest tightness, shortness of breath and stridor.    Cardiovascular:  Negative for chest pain and palpitations.   Gastrointestinal:  Negative for abdominal distention, abdominal pain, blood in stool, constipation, diarrhea and rectal pain.   Endocrine: Negative for cold intolerance, heat intolerance, polydipsia, polyphagia and polyuria.   Genitourinary:  Negative for dysuria, flank pain, hematuria and urgency.   Musculoskeletal:  Negative for arthralgias, gait problem, myalgias and neck  "stiffness.   Skin:  Negative for color change and rash.   Allergic/Immunologic: Negative for environmental allergies and food allergies.   Neurological:  Negative for dizziness, seizures, syncope, speech difficulty and headaches.   Hematological:  Negative for adenopathy.   Psychiatric/Behavioral:  Negative for agitation, confusion, decreased concentration, dysphoric mood and sleep disturbance. The patient is not nervous/anxious.        Objective   Vitals:  /80 (BP Location: Right arm, Patient Position: Sitting, BP Cuff Size: Adult)   Pulse 61   Resp 15   Ht 1.626 m (5' 4\")   Wt 74.6 kg (164 lb 6.4 oz)   SpO2 99%   BMI 28.22 kg/m²       Physical Exam  Vitals reviewed.   Constitutional:       General: She is not in acute distress.     Appearance: Normal appearance. She is normal weight. She is not ill-appearing or diaphoretic.   HENT:      Head: Normocephalic.      Right Ear: Tympanic membrane and external ear normal.      Left Ear: Tympanic membrane and external ear normal.      Nose: Nose normal. No congestion.      Mouth/Throat:      Pharynx: No posterior oropharyngeal erythema.   Eyes:      General:         Right eye: No discharge.         Left eye: No discharge.      Extraocular Movements: Extraocular movements intact.      Conjunctiva/sclera: Conjunctivae normal.      Pupils: Pupils are equal, round, and reactive to light.   Cardiovascular:      Rate and Rhythm: Normal rate and regular rhythm.      Pulses: Normal pulses.      Heart sounds: Normal heart sounds. No murmur heard.  Pulmonary:      Effort: Pulmonary effort is normal. No respiratory distress.      Breath sounds: Normal breath sounds. No wheezing or rales.   Chest:      Chest wall: No tenderness.   Abdominal:      General: Abdomen is flat. Bowel sounds are normal. There is no distension.      Palpations: There is no mass.      Tenderness: There is no abdominal tenderness. There is no guarding.   Musculoskeletal:         General: No " tenderness. Normal range of motion.      Cervical back: Normal range of motion and neck supple. No tenderness.      Right lower leg: No edema.      Left lower leg: No edema.   Skin:     General: Skin is dry.      Coloration: Skin is not jaundiced.      Findings: No bruising, erythema or rash.   Neurological:      General: No focal deficit present.      Mental Status: She is alert and oriented to person, place, and time. Mental status is at baseline.      Cranial Nerves: No cranial nerve deficit.      Sensory: No sensory deficit.      Coordination: Coordination normal.      Gait: Gait normal.   Psychiatric:         Thought Content: Thought content normal.         Judgment: Judgment normal.         Assessment/Plan   Problem List Items Addressed This Visit       Anxiety disorder, unspecified    Relevant Medications    LORazepam (Ativan) 1 mg tablet    Hyperlipidemia    Relevant Medications    atorvastatin (Lipitor) 10 mg tablet    RESOLVED: Low back pain, unspecified    Osteoporosis    Relevant Medications    denosumab (Prolia) 60 mg/mL syringe    Vitamin D deficiency    Relevant Medications    ergocalciferol (Vitamin D-2) 1.25 MG (07010 UT) capsule     Other Visit Diagnoses       Encounter for Medicare annual wellness exam    -  Primary    Routine general medical examination at health care facility        Need for influenza vaccination        Relevant Orders    Flu vaccine, quadrivalent, high-dose, preservative free, age 65y+ (FLUZONE) (Completed)    Medication management        Relevant Orders    Opiate/Opioid/Benzo Extended Prescription Compliance (Completed)    OOB Internal Tracking (Completed)    Pain in left leg        ACP (advance care planning)        Presence of artificial hip, unspecified laterality        Relevant Orders    Nickel    Shellfish allergy        Relevant Medications    EPINEPHrine (Epipen) 0.3 mg/0.3 mL injection syringe    Other Relevant Orders    Follow Up In Advanced Primary Care - Pharmacy               Scribe Attestation  By signing my name below, I, ASTRID Knott , Joseline   attest that this documentation has been prepared under the direction and in the presence of Daniel Solano DO.   Provider Attestation - Scribe documentation    All medical record entries made by the Scribe were at my direction and personally dictated by me. I have reviewed the chart and agree that the record accurately reflects my personal performance of the history, physical exam, discussion and plan.

## 2023-10-17 NOTE — PATIENT INSTRUCTIONS
Follow up in 3 MONTHS     Continue current medications and therapy for chronic medical conditions.    Patient was advised importance of proper diet/nutrition in addition adequate hydration. Patient was encouraged moderate exercise program to include 30 minutes daily for 5 days of the week or 150 minutes weekly. Patient will follow-up with us as scheduled.    I personally reviewed the OARRS report for this patient. I have considered the risks of abuse, dependence, addiction, and diversion.    Review labs from August 2023    UDS/CSA: 10/17/2023    START VITAMIN D 50,000 UNITS ONCE WEEKLY     START PROLIA INJECTION MAXWELL 6 MONTHS    OBTAIN NICKEL allergy test    Administer influenza vaccine    EpiPen    Vitamin D 50,000 IU weekly x12    Start Lipitor 10 mg nightly    Advanced directives     Cardiac risk reduction    Annual physical exam

## 2023-10-18 DIAGNOSIS — M81.0 OSTEOPOROSIS, UNSPECIFIED OSTEOPOROSIS TYPE, UNSPECIFIED PATHOLOGICAL FRACTURE PRESENCE: Primary | ICD-10-CM

## 2023-10-18 LAB
AMPHETAMINES UR QL SCN: NORMAL
BARBITURATES UR QL SCN: NORMAL
BZE UR QL SCN: NORMAL
CANNABINOIDS UR QL SCN: NORMAL
CREAT UR-MCNC: 30.8 MG/DL (ref 20–320)
PCP UR QL SCN: NORMAL

## 2023-10-18 RX ORDER — GABAPENTIN 300 MG/1
600 CAPSULE ORAL NIGHTLY
Qty: 60 CAPSULE | Refills: 3 | Status: SHIPPED | OUTPATIENT
Start: 2023-10-18

## 2023-10-19 LAB
1OH-MIDAZOLAM UR CFM-MCNC: <25 NG/ML
6MAM UR CFM-MCNC: <25 NG/ML
7AMINOCLONAZEPAM UR CFM-MCNC: <25 NG/ML
A-OH ALPRAZ UR CFM-MCNC: <25 NG/ML
ALPRAZ UR CFM-MCNC: <25 NG/ML
CHLORDIAZEP UR CFM-MCNC: <25 NG/ML
CLONAZEPAM UR CFM-MCNC: <25 NG/ML
CODEINE UR CFM-MCNC: <50 NG/ML
DIAZEPAM UR CFM-MCNC: <25 NG/ML
EDDP UR CFM-MCNC: <25 NG/ML
FENTANYL UR CFM-MCNC: <2.5 NG/ML
HYDROCODONE CTO UR CFM-MCNC: <25 NG/ML
HYDROMORPHONE UR CFM-MCNC: <25 NG/ML
LORAZEPAM UR CFM-MCNC: 568 NG/ML
METHADONE UR CFM-MCNC: <25 NG/ML
MIDAZOLAM UR CFM-MCNC: <25 NG/ML
MORPHINE UR CFM-MCNC: <50 NG/ML
NORDIAZEPAM UR CFM-MCNC: <25 NG/ML
NORFENTANYL UR CFM-MCNC: <2.5 NG/ML
NORHYDROCODONE UR CFM-MCNC: <25 NG/ML
NOROXYCODONE UR CFM-MCNC: <25 NG/ML
NORTRAMADOL UR-MCNC: <50 NG/ML
OXAZEPAM UR CFM-MCNC: <25 NG/ML
OXYCODONE UR CFM-MCNC: <25 NG/ML
OXYMORPHONE UR CFM-MCNC: <25 NG/ML
TEMAZEPAM UR CFM-MCNC: <25 NG/ML
TRAMADOL UR CFM-MCNC: <50 NG/ML
ZOLPIDEM UR CFM-MCNC: <25 NG/ML
ZOLPIDEM UR-MCNC: <25 NG/ML

## 2023-11-07 ENCOUNTER — LAB (OUTPATIENT)
Dept: LAB | Facility: LAB | Age: 73
End: 2023-11-07
Payer: MEDICARE

## 2023-11-07 DIAGNOSIS — Z96.649 PRESENCE OF ARTIFICIAL HIP, UNSPECIFIED LATERALITY: ICD-10-CM

## 2023-11-07 PROCEDURE — 36415 COLL VENOUS BLD VENIPUNCTURE: CPT

## 2023-11-07 PROCEDURE — 83885 ASSAY OF NICKEL: CPT

## 2023-11-09 LAB — NICKEL SERPL-MCNC: <2 UG/L

## 2024-01-11 ENCOUNTER — APPOINTMENT (OUTPATIENT)
Dept: PRIMARY CARE | Facility: CLINIC | Age: 74
End: 2024-01-11
Payer: MEDICARE

## 2024-02-13 DIAGNOSIS — M54.50 LOW BACK PAIN, UNSPECIFIED: ICD-10-CM

## 2024-02-13 DIAGNOSIS — F41.9 ANXIETY DISORDER, UNSPECIFIED: ICD-10-CM

## 2024-02-13 DIAGNOSIS — M79.605 PAIN IN LEFT LEG: ICD-10-CM

## 2024-02-14 RX ORDER — LORAZEPAM 1 MG/1
1 TABLET ORAL 3 TIMES DAILY
Qty: 90 TABLET | Refills: 0 | Status: SHIPPED | OUTPATIENT
Start: 2024-02-14

## 2024-02-14 RX ORDER — GABAPENTIN 300 MG/1
600 CAPSULE ORAL NIGHTLY
Qty: 60 CAPSULE | Refills: 3 | OUTPATIENT
Start: 2024-02-14

## 2024-04-11 DIAGNOSIS — F41.9 ANXIETY DISORDER, UNSPECIFIED: ICD-10-CM

## 2024-04-13 RX ORDER — LORAZEPAM 1 MG/1
1 TABLET ORAL 3 TIMES DAILY
Qty: 21 TABLET | Refills: 0 | OUTPATIENT
Start: 2024-04-13

## 2024-04-15 NOTE — TELEPHONE ENCOUNTER
Medication will not be refilled without office visit per SHIRLEY.  left 04/11 to schedule. Last seen 10/17/2023.

## 2025-02-17 ENCOUNTER — TELEPHONE (OUTPATIENT)
Dept: PRIMARY CARE | Facility: CLINIC | Age: 75
End: 2025-02-17
Payer: MEDICARE

## 2025-02-17 NOTE — TELEPHONE ENCOUNTER
FYJOHN spoke with patient has question in regards to bill for $220.00 in regards to drug screen.  Spoke Ronit from billing bill will be reviewed and resubmitted to Medicare.  Patient called and updated.

## 2025-06-16 ENCOUNTER — TELEPHONE (OUTPATIENT)
Dept: CARDIOLOGY | Facility: CLINIC | Age: 75
End: 2025-06-16

## 2025-06-16 ENCOUNTER — APPOINTMENT (OUTPATIENT)
Dept: CARDIOLOGY | Facility: CLINIC | Age: 75
End: 2025-06-16
Payer: MEDICARE

## 2025-06-16 VITALS
HEIGHT: 64 IN | BODY MASS INDEX: 30.49 KG/M2 | DIASTOLIC BLOOD PRESSURE: 60 MMHG | SYSTOLIC BLOOD PRESSURE: 122 MMHG | WEIGHT: 178.6 LBS | HEART RATE: 81 BPM

## 2025-06-16 DIAGNOSIS — E78.2 MIXED HYPERLIPIDEMIA: ICD-10-CM

## 2025-06-16 DIAGNOSIS — R07.9 CHEST PAIN, UNSPECIFIED TYPE: ICD-10-CM

## 2025-06-16 DIAGNOSIS — R00.2 PALPITATIONS: ICD-10-CM

## 2025-06-16 DIAGNOSIS — R60.0 LOCALIZED EDEMA: Primary | ICD-10-CM

## 2025-06-16 DIAGNOSIS — M94.0 COSTOCHONDRITIS: ICD-10-CM

## 2025-06-16 PROCEDURE — 1157F ADVNC CARE PLAN IN RCRD: CPT | Performed by: INTERNAL MEDICINE

## 2025-06-16 PROCEDURE — 3008F BODY MASS INDEX DOCD: CPT | Performed by: INTERNAL MEDICINE

## 2025-06-16 PROCEDURE — 1036F TOBACCO NON-USER: CPT | Performed by: INTERNAL MEDICINE

## 2025-06-16 PROCEDURE — 93000 ELECTROCARDIOGRAM COMPLETE: CPT | Performed by: INTERNAL MEDICINE

## 2025-06-16 PROCEDURE — 1159F MED LIST DOCD IN RCRD: CPT | Performed by: INTERNAL MEDICINE

## 2025-06-16 PROCEDURE — 99204 OFFICE O/P NEW MOD 45 MIN: CPT | Performed by: INTERNAL MEDICINE

## 2025-06-16 RX ORDER — ROSUVASTATIN CALCIUM 10 MG/1
1 TABLET, COATED ORAL
COMMUNITY
Start: 2025-06-02

## 2025-06-16 NOTE — PATIENT INSTRUCTIONS
FOLLOW UP WITH Dr. Po Ortega MD, [facility] AFTER TESTING     14 DAY ZIOPATCH MONITOR TO BE SCHEDULED IN THE NEAR FUTURE  IN PREPARATION FOR YOUR MONITOR APPOINTMENT BATHE OR SHOWER PRIOR TO YOUR APPOINTMENT.  DO NOT APPLY ANY THING TO YOUR SKIN-  NO LOTION, CREAMS, OILS, POWDERS, PERFUMES, COLOGNES OR SPRAY FRAGRANCES.      ECHO TO BE SCHEDULED IN THE NEAR FUTURE    DID YOU KNOW  We have a pharmacy here in the [facility].  They can fill all prescriptions, not just cardiac medications.  Prescriptions from other pharmacies can easily be transferred to the  pharmacy by the  pharmacist on site.   pharmacies offer FREE HOME DELIVERY on medications to anywhere in [place]. They can sync your medications. Typically prescriptions can be ready in 10 - 15 minutes. If pharmacy is unable to fill your  prescription or if cost is more than your paying now the Pharmacist can easily transfer back to your Pharmacy of choice. Pharmacy phone # [phone].     Please bring all medicines, vitamins, and herbal supplements with you in original bottles to every appointment! This is the best way to ensure your medication list in your chart is accurate.    Prescriptions will not be filled unless you are compliant with your follow up appointments or have a follow up appointment scheduled as per instruction of your physician. Refills should be requested at the time of your visit.

## 2025-06-16 NOTE — PROGRESS NOTES
Patient:  Vaishali Valentine  YOB: 1950  MRN: [id number]       Impression/Plan:     Diagnoses and all orders for this visit:  Localized edema  -     I believe her edema is a combination of venous insufficiency and gabapentin.  -     Will obtain echocardiogram to assure no valvular disease or pulmonary hypertension has developed since echo some years ago.  -     Transthoracic Echo Complete; Future  Chest pain, unspecified type  Costochondritis  -     Pain is reproducible on palpation of her chest wall.  This reproduces what she describes.  It is most consistent with costochondritis she has no symptoms to suggest angina  Palpitations  -     Holter Or Event Cardiac Monitor; Future  -     Transthoracic Echo Complete; Future  Mixed hyperlipidemia  -     Cholesterols been very well-controlled on current statin dosage.      Chief Complaint/Active Symptoms:      Chief Complaint   Patient presents with    New Patient Visit     Presents today for NPV and edema      Edema       Vaishali Valentine is a 74 y.o. female who presents with CAD based on coronary CTA 1/14/2020 showing trivial plaque throughout with a calcium score of only 34.she had mesenteric artery stenosis treated with stenting at [facility] in 2020, and carotid artery disease. CTA of the carotid arteries 2022 suggested mild beading in the mid cervical segment of the right internal carotid and irregularities in the cervical segment of internal carotid.  He followed thereafter with Dr. Aceves at [facility] who did not feel fibromuscular dysplasia was active simply vascular atherosclerosis.    Presents at this time for new consultation secondary to palpitations chest pain and edema.    Her sister Helena Currie has hypertension and atrial fibrillation but there is no family history of premature coronary disease.    1-: Coronary CTA mild plaque throughout. No coronary stenosis. CA score 34       9/5/2013. Coronary angiogram. LVEF 60%.  Trivial irregularities     · February 2022: CTA: Suggestion of fibromuscular dysplasia. Mild atherosclerotic plaque.      No significant stenosis described.    I had seen her over 3 years ago with the above described mild vascular disease she was encouraged to stop smoking and to follow-up regularly with primary care to aggressively control cholesterol.    Seen in the emergency department [facility]/27/24 for blood pressure elevation at 150/79.  Felt possibly related to recent infection of the sinuses and medications thereof.  No cardiac issues discovered    6/10/2025 CMP normal  Cholesterol 150 triglycerides 101 HDL 58 LDL 74 B12 Copper levels rheumatoid factor is normal.  CBC 1 year ago normal    Since I saw her over 3 years ago she has had no admissions to the hospital for cardiac events.  She describes episodes of chest tightness that last seconds at a time perhaps 2-3 times a week.  Very localized in her chest wall.  She does note lower extremity edema which has been more problematic of late.  She also is limited in her activity because of severe arthritis she has had recurrent dislocation of her hip even after was replaced.  He has bilateral Baker cysts also contributing to the edema.  She does not smoke cigarettes but does vape perhaps once a week.  Since she has been on steroids for her arthritis she is gained some 15 pounds.  She is able to work out any yard doing flowers and she does not feel particularly short of breath or have chest pain when she is outside working.  She gets tired very easily probably related to her arthritis issues.  She also notices at night that she has fluttering in her chest about 2 times a week.  It never occurs when she is exerting herself.    ECG: Sinus rhythm PAC otherwise unremarkable      Medical History[1]  Reviewed; primarily remarkable for:  mild atherosclerotic disease as described above  Depression  Hypothyroidism  Anxiety  Osteoarthritis  Esophageal  reflux  Hyperlipidemia  Mesenteric artery stenosis stenting April 2020 at [facility]  COPD    Surgical History[2]  Reviewed primarily remarkable for:  Appendectomy  Cervical laminectomy  Knee replacement  5/20/2022 right total hip breath arthroplasty    Family History[3]  Mother with pacemaker father with heart disease  Sister with hypertension             Review of Systems: Unremarkable except as noted above    Meds     Current Outpatient Medications   Medication Instructions    aspirin 81 mg EC tablet 1 tablet    atorvastatin (LIPITOR) 10 mg, oral, Daily    azelastine (Astelin) 137 mcg (0.1 %) nasal spray 1 spray, 2 times daily    cyanocobalamin (Vitamin B-12) 1,000 mcg tablet 1 tablet, Daily RT    denosumab (Prolia) 60 mg/mL syringe Provider to inject 60mg (1 prefilled syringe) beneath the skin once every 6 months.    EPINEPHrine (EPIPEN) 0.3 mg, intramuscular, As needed, Call 911 after use.    ergocalciferol (VITAMIN D-2) 50,000 Units, oral, Once Weekly    gabapentin (NEURONTIN) 600 mg, oral, Nightly    hydroCHLOROthiazide (HYDRODiuril) 25 mg tablet TAKE 1 TABLET DAILY AS NEEDED for swelling    loratadine (Claritin) 10 mg tablet 1 tablet, Daily    LORazepam (ATIVAN) 1 mg, oral, 3 times daily    rosuvastatin (Crestor) 10 mg tablet 1 tablet, Daily (0630)    trospium (SANCTURA) 20 mg, 2 times daily    Vitamin D3 50 mcg, oral, Daily        Allergies   Allergies[4]      Annotated Problems     Specialty Problems          Cardiology Problems    Atherosclerosis of coronary artery    Bilateral carotid artery stenosis    CTA: Suggestion of fibromuscular dysplasia. Mild atherosclerotic plaque. No significant stenosis described.         Current every day smoker    Fibromuscular dysplasia    Hyperlipidemia    PVD (peripheral vascular disease)        Problem List     Patient Active Problem List    Diagnosis Date Noted    Abnormal angiogram of vessels of neck 03/22/2023    Acquired hypothyroidism 03/22/2023    Anxiety  "disorder, unspecified 03/22/2023    Atherosclerosis of coronary artery 03/22/2023    Bilateral carotid artery stenosis 03/22/2023    Chronic sinusitis 03/22/2023    COPD, mild (Multi) 03/22/2023    Current every day smoker 03/22/2023    Depression 03/22/2023    Fatigue 03/22/2023    Fibromuscular dysplasia 03/22/2023    GERD (gastroesophageal reflux disease) 03/22/2023    Hair loss 03/22/2023    Hyperlipidemia 03/22/2023    Knee pain, right 03/22/2023    Left shoulder pain 03/22/2023    Memory changes 03/22/2023    Mesenteric artery stenosis (Multi) 03/22/2023    Osteoporosis 03/22/2023    Other intervertebral disc degeneration, lumbar region 03/22/2023    Idiopathic peripheral neuropathy 03/22/2023    Peripheral neuropathy 03/22/2023    Polyarthralgia 03/22/2023    Primary osteoarthritis of right hip 03/22/2023    PVD (peripheral vascular disease) 03/22/2023    Seizure disorder (Multi) 03/22/2023    Stress 03/22/2023    Vitamin D deficiency 03/22/2023    Osteoarthritis of lumbar spine 03/22/2023    Lumbar radicular pain 03/22/2023    Prophylactic antibiotic 03/22/2023    Combined form of age-related cataract, both eyes 04/20/2022    Chronic mesenteric ischemia (Multi) 07/10/2020    Facet arthropathy, lumbosacral 12/16/2019    Migraine 09/11/2018    Osteoarthritis of left knee 11/08/2017    Headache 10/01/2015    Cervical radiculitis 01/13/2014    Allergic rhinitis 10/28/2013    Carpal tunnel syndrome 07/24/2013    Blepharochalasis 11/17/2010    Thoracic facet syndrome 01/29/2007    Brachial neuritis or radiculitis 01/29/2007       Objective:     Vitals:    06/16/25 0831   Weight: 81 kg (178 lb 9.6 oz)   Height: 1.626 m (5' 4\")      Wt Readings from Last 4 Encounters:   06/16/25 81 kg (178 lb 9.6 oz)   10/17/23 74.6 kg (164 lb 6.4 oz)   10/13/23 74.8 kg (165 lb)   07/31/23 76.2 kg (168 lb)           LAB:     Lab Results   Component Value Date    WBC 6.1 10/21/2022    HGB 12.9 10/21/2022    HCT 40.7 10/21/2022    PLT " 297 10/21/2022    CHOL 231 (H) 08/12/2023    TRIG 72 08/12/2023    HDL 58.0 08/12/2023    ALT 13 10/21/2022    AST 19 10/21/2022     10/21/2022    K 4.2 10/21/2022     10/21/2022    CREATININE 0.67 10/21/2022    BUN 16 10/21/2022    CO2 28 10/21/2022    TSH 1.50 08/12/2023    INR 1.0 07/29/2021         Physical Exam     General Appearance: alert and oriented to person, place and time, in no acute distress  Cardiovascular: normal rate, regular rhythm, normal S1 and S2, no murmurs, rubs, clicks, or gallops,  no JVD  Pulmonary/Chest: clear to auscultation bilaterally- no wheezes, rales or rhonchi, normal air movement, no respiratory distress  Abdomen: soft, non-tender, non-distended, normal bowel sounds, no masses   Extremities: no cyanosis, clubbing .  1+ bilateral edema  Skin: warm and dry, no rash or erythema  Eyes: EOMI  Neck: supple and non-tender without mass, no thyromegaly   Neurological: alert, oriented, normal speech, no focal findings or movement disorder noted  Vascular: I do not appreciate    Scribe Attestation  By signing my name below, I, Sandra Nunez RN, Scribe   attest that this documentation has been prepared under the direction and in the presence of Po Ortega MD.        Provider attestation-scribe documentation  Any medical record entries made by the scribe were at my discretion and personally dictated by me.  I have reviewed the chart and agree that the record accurately reflects my personal performance of the history, physical exam, discussion and plan.                 [1]   Past Medical History:  Diagnosis Date    Candidiasis, unspecified 01/07/2021    Yeast infection    Chronic vascular disorders of intestine 02/04/2020    Superior mesenteric artery stenosis    Contact with and (suspected) exposure to covid-19 11/13/2020    Suspected COVID-19 virus infection    Contact with and (suspected) exposure to covid-19 11/13/2020    Exposure to COVID-19 virus    Cough, unspecified  04/07/2020    Cough in adult    Encounter for other preprocedural examination 08/02/2021    Encounter for preadmission testing    Hypothyroidism, unspecified 02/28/2022    Acquired hypothyroidism    Infection and inflammatory reaction due to cystostomy catheter, subsequent encounter 04/16/2019    Urinary tract infection associated with cystostomy catheter, subsequent encounter    Pain in right hip 08/05/2021    Hip pain, right    Personal history of other diseases of the respiratory system 11/17/2020    History of acute pharyngitis    Personal history of other diseases of the respiratory system 12/04/2021    History of acute sinusitis    Personal history of other diseases of the respiratory system 12/21/2019    History of sinusitis    Personal history of other diseases of the respiratory system 11/13/2019    History of bacterial sinusitis    Personal history of other drug therapy 10/07/2019    History of pneumococcal vaccination    Personal history of other drug therapy 09/27/2019    History of influenza vaccination    Personal history of other mental and behavioral disorders 12/13/2021    History of anxiety    Personal history of other specified conditions 12/03/2019    History of dizziness    Personal history of urinary (tract) infections 10/14/2019    History of urinary tract infection    Pleurodynia 11/12/2019    Rib pain on right side    S/P cervical spinal fusion 01/13/2014    Status post revision of total hip replacement 03/22/2023    Status post total replacement of hip 03/22/2023    Unspecified osteoarthritis, unspecified site 06/02/2022    Arthritis    Urinary tract infection, site not specified 08/02/2021    Acute UTI   [2]   Past Surgical History:  Procedure Laterality Date    CT ABDOMEN PELVIS ANGIOGRAM W AND/OR WO IV CONTRAST  12/21/2019    CT ABDOMEN PELVIS ANGIOGRAM W AND/OR WO [facility]    [facility]GIO [facility]ECK  2/7/2022    CT NECK ANGIO W AND WO IV CONTRAST 2/7/2022 [facility]    CT HEAD ANGIO W AND WO IV CONTRAST  2/7/2022    CT HEAD ANGIO W AND WO IV CONTRAST 2/7/2022 [facility]    OTHER SURGICAL HISTORY  04/16/2019    Knee surgery    OTHER SURGICAL HISTORY  12/23/2021    Knee replacement    OTHER SURGICAL HISTORY  12/23/2021    Stomach surgery    OTHER SURGICAL HISTORY  12/23/2021    Appendectomy    OTHER SURGICAL HISTORY  12/23/2021    Colonoscopy    OTHER SURGICAL HISTORY  12/23/2021    Cervical laminectomy    OTHER SURGICAL HISTORY  12/23/2021    Gallbladder surgery    OTHER SURGICAL HISTORY  05/17/2022    Cataract surgery    OTHER SURGICAL HISTORY  06/03/2022    Hip replacement   [3]   Family History  Problem Relation Name Age of Onset    Other (cardiac pacemaker) Mother      Other (heart problem) Father     [4]   Allergies  Allergen Reactions    Shellfish Containing Products Anaphylaxis and Angioedema    Amoxicillin-Pot Clavulanate Other     Gatrointestinal upset    Cephalexin Unknown    Doxycycline Unknown    Ibandronate Unknown    Lidocaine Unknown    Nickel Unknown     Nickel    Prednisone Unknown     Pt. had chest pain and red facial rash    Other reaction(s): Other: See Comments   Pt. had chest pain and red facial rash    Sertraline Unknown    Simvastatin Unknown    Tramadol Unknown    Adhesive Tape-Silicones Rash    Sulfate Salt Hives and Rash     Sulfate Soaps

## 2025-07-14 ENCOUNTER — APPOINTMENT (OUTPATIENT)
Dept: CARDIOLOGY | Facility: CLINIC | Age: 75
End: 2025-07-14
Payer: MEDICARE

## 2025-07-14 ENCOUNTER — ANCILLARY PROCEDURE (OUTPATIENT)
Dept: CARDIOLOGY | Facility: HOSPITAL | Age: 75
End: 2025-07-14
Payer: MEDICARE

## 2025-07-14 DIAGNOSIS — R00.2 PALPITATIONS: ICD-10-CM

## 2025-07-14 DIAGNOSIS — R07.9 CHEST PAIN, UNSPECIFIED TYPE: ICD-10-CM

## 2025-07-14 DIAGNOSIS — R60.0 LOCALIZED EDEMA: ICD-10-CM

## 2025-07-14 PROCEDURE — 93306 TTE W/DOPPLER COMPLETE: CPT

## 2025-07-22 LAB
AORTIC VALVE MEAN GRADIENT: 4 MMHG
AORTIC VALVE PEAK VELOCITY: 1.41 M/S
AV PEAK GRADIENT: 8 MMHG
AVA (PEAK VEL): 2.58 CM2
AVA (VTI): 2.62 CM2
EJECTION FRACTION APICAL 4 CHAMBER: 67.5
EJECTION FRACTION: 65 %
LEFT VENTRICLE INTERNAL DIMENSION DIASTOLE: 4.23 CM (ref 3.5–6)
LEFT VENTRICULAR OUTFLOW TRACT DIAMETER: 1.98 CM
LV EJECTION FRACTION BIPLANE: 65 %
MITRAL VALVE E/A RATIO: 0.96
MITRAL VALVE E/E' RATIO: 8.43
RIGHT VENTRICLE FREE WALL PEAK S': 11.33 CM/S
TRICUSPID ANNULAR PLANE SYSTOLIC EXCURSION: 1.7 CM

## 2025-08-03 PROCEDURE — 93248 EXT ECG>7D<15D REV&INTERPJ: CPT | Performed by: INTERNAL MEDICINE

## 2025-08-04 ENCOUNTER — APPOINTMENT (OUTPATIENT)
Dept: CARDIOLOGY | Facility: CLINIC | Age: 75
End: 2025-08-04
Payer: MEDICARE

## 2025-08-04 VITALS
SYSTOLIC BLOOD PRESSURE: 100 MMHG | WEIGHT: 178.6 LBS | HEART RATE: 64 BPM | DIASTOLIC BLOOD PRESSURE: 66 MMHG | HEIGHT: 64 IN | BODY MASS INDEX: 30.49 KG/M2

## 2025-08-04 DIAGNOSIS — I47.19 PAROXYSMAL ATRIAL TACHYCARDIA: Primary | ICD-10-CM

## 2025-08-04 DIAGNOSIS — R60.0 LOCALIZED EDEMA: ICD-10-CM

## 2025-08-04 DIAGNOSIS — Z01.818 PRE-OPERATIVE CLEARANCE: ICD-10-CM

## 2025-08-04 PROCEDURE — 1159F MED LIST DOCD IN RCRD: CPT | Performed by: INTERNAL MEDICINE

## 2025-08-04 PROCEDURE — 99214 OFFICE O/P EST MOD 30 MIN: CPT | Performed by: INTERNAL MEDICINE

## 2025-08-04 PROCEDURE — G2211 COMPLEX E/M VISIT ADD ON: HCPCS | Performed by: INTERNAL MEDICINE

## 2025-08-04 PROCEDURE — 3008F BODY MASS INDEX DOCD: CPT | Performed by: INTERNAL MEDICINE

## 2025-08-04 RX ORDER — METOPROLOL TARTRATE 25 MG/1
25 TABLET, FILM COATED ORAL 2 TIMES DAILY
Qty: 180 TABLET | Refills: 3 | Status: SHIPPED | OUTPATIENT
Start: 2025-08-04 | End: 2026-08-04

## 2025-08-04 NOTE — PROGRESS NOTES
Patient:  Vaishali Valentine  YOB: 1950  MRN: [id number]       Impression/Plan:     Diagnoses and all orders for this visit:  Paroxysmal atrial tachycardia  -    She has significant paroxysmal atrial tachycardia relatively short-lived and relatively low burden over 14 days of monitoring.  -     Will begin with low-dose Lopressor as blood pressure is low normal.  Plan to reassess in 2 weeks prior to her ERCP to assure well-tolerated  -     All of the symptoms on the monitor correlated to sinus rhythm.  Much of her symptoms are related to her significant anxiety which she states has been a problem for a long time beta-blocker may actually help that to some degree  -     metoprolol tartrate (Lopressor) 25 mg tablet; Take 1 tablet (25 mg) by mouth 2 times a day.  Localized edema  -     Mild at this time  Pre-operative clearance        -     She has no significant coronary artery disease.  She has preserved LV systolic function.  She has no significant valvular disease.  Although she has chest pain it is noncardiac and has no angina or CHF.  She does have paroxysmal atrial tachycardia short duration and low burden now is going to be on low-dose beta-blocker.  She has no contraindication to proceed with ERCP though needs to be done in a monitored setting due to the possibility of the stress precipitating atrial tach.      Chief Complaint/Active Symptoms:      Chief Complaint   Patient presents with    Results     Presents today for follow up of TESTING AND TO REVIEW RESULTS       Vaishali Valentine is a 74 y.o. female who presents with CAD based on coronary CTA 1/14/2020 showing trivial plaque throughout with a calcium score of only 34.she had mesenteric artery stenosis treated with stenting at [facility] in 2020, and carotid artery disease. CTA of the carotid arteries 2022 suggested mild beading in the mid cervical segment of the right internal carotid and irregularities in the cervical segment of internal  carotid.  She followed thereafter with Dr. Aceves at [facility] who did not feel fibromuscular dysplasia was active simply vascular atherosclerosis. .         Her sister Helena Currie has hypertension and atrial fibrillation but there is no family history of premature coronary disease.     1-: Coronary CTA mild plaque throughout. No coronary stenosis. CA score 34       9/5/2013. Coronary angiogram. LVEF 60%. Trivial irregularities      February 2022: CTA carotids: Suggestion of fibromuscular dysplasia. Mild atherosclerotic plaque.      No significant stenosis described.   (felt by evaluation at [facility] vascular to be atherosclerotic and not fibromuscular dysplasia)    I had seen her as a new patient 6/16/2025 for chest pain which was most consistent with costochondritis.  She also described palpitations localized edema and in light of this echo and monitoring was pursued.  Her cholesterol was well-controlled at that time.    7/14/2025 echocardiogram  1. Left ventricular ejection fraction is normal by visual estimate at 65%.   2. Normal chamber sizes.   3. No significant valvular heart disease.   4. There is normal right ventricular global systolic function.   5. No significant change from previous study.    7/14/2514 day monitoring  Duration of monitoring 13 days and 24 hours, rhythm analysis 13 days and 3 hours.  Predominant rhythm was sinus, with minimum sinus rate of 41 bpm average sinus rate of 70 bpm and maximum sinus rate of 122 bpm.     There were runs of narrow complex tachycardia most consistent with paroxysmal atrial tachycardia.  Fastest ventricular rate 194 bpm, longest run 18 bpm, was also the fastest rate.  There were isolated supraventricular premature beats and isolated ventricular premature beats and occasional ventricular couplets.     This is an abnormal Holter showing runs of narrow complex tachycardia which are more consistent with paroxysmal atrial tachycardia with a  gradual speeding up and slowing down, longest run 18 beats fastest rate 194 bpm.    Has palpitations, primarily at night. Usually last 30 minutes. Feels like it is fast. No dizziness. No angina/dyspnea.  Rarely has palpitations during day.  Did get dizzy once while outside but no associated palpitations.     Currently without chest pain or shortness of breath but does note her heart to be racing sometimes.  Importantly while wearing the monitor although she did have episodes of atrial tachycardia and I have reviewed the tracings and agree most of her symptoms correlated to normal sinus rhythm.  She says she is very anxious much of the time.  She does note musculoskeletal type chest pain not much different than previously.    She is also in the process of having ERCP to evaluate pancreatic abnormality at [facility].        Review of Systems: Unremarkable except as noted above    Meds     Current Outpatient Medications   Medication Instructions    aspirin 81 mg EC tablet 1 tablet, Daily    azelastine (Astelin) 137 mcg (0.1 %) nasal spray 1 spray, 2 times daily    cyanocobalamin (Vitamin B-12) 1,000 mcg tablet 1 tablet, Daily RT    denosumab (Prolia) 60 mg/mL syringe Provider to inject 60mg (1 prefilled syringe) beneath the skin once every 6 months.    EPINEPHrine (EPIPEN) 0.3 mg, intramuscular, As needed, Call 911 after use.    gabapentin (NEURONTIN) 600 mg, oral, Nightly    hydroCHLOROthiazide (HYDRODiuril) 25 mg tablet TAKE 1 TABLET DAILY AS NEEDED for swelling    loratadine (Claritin) 10 mg tablet 1 tablet, Daily    LORazepam (ATIVAN) 1 mg, oral, 3 times daily    rosuvastatin (Crestor) 10 mg tablet 1 tablet, Daily (0630)    trospium (SANCTURA) 20 mg, 2 times daily    Vitamin D3 50 mcg, oral, Daily        Allergies   Allergies[1]      Annotated Problems     Specialty Problems          Cardiology Problems    Atherosclerosis of coronary artery    Bilateral carotid artery stenosis    CTA: Suggestion of  fibromuscular dysplasia. Mild atherosclerotic plaque. No significant stenosis described.         Current every day smoker    Fibromuscular dysplasia    Hyperlipidemia    PVD (peripheral vascular disease)    Chest pain    Palpitations        Problem List     Patient Active Problem List    Diagnosis Date Noted    Palpitations 06/16/2025    Localized edema 06/16/2025    Chest pain 06/16/2025    Costochondritis 06/16/2025    Abnormal angiogram of vessels of neck 03/22/2023    Acquired hypothyroidism 03/22/2023    Anxiety disorder, unspecified 03/22/2023    Atherosclerosis of coronary artery 03/22/2023    Bilateral carotid artery stenosis 03/22/2023    Chronic sinusitis 03/22/2023    COPD, mild (Multi) 03/22/2023    Current every day smoker 03/22/2023    Depression 03/22/2023    Swelling 03/22/2023    Fatigue 03/22/2023    Fibromuscular dysplasia 03/22/2023    GERD (gastroesophageal reflux disease) 03/22/2023    Hair loss 03/22/2023    Hyperlipidemia 03/22/2023    Knee pain, right 03/22/2023    Left shoulder pain 03/22/2023    Memory changes 03/22/2023    Mesenteric artery stenosis (Multi) 03/22/2023    Osteoporosis 03/22/2023    Other intervertebral disc degeneration, lumbar region 03/22/2023    Idiopathic peripheral neuropathy 03/22/2023    Peripheral neuropathy 03/22/2023    Polyarthralgia 03/22/2023    Primary osteoarthritis of right hip 03/22/2023    PVD (peripheral vascular disease) 03/22/2023    Seizure disorder (Multi) 03/22/2023    Stress 03/22/2023    Vitamin D deficiency 03/22/2023    Osteoarthritis of lumbar spine 03/22/2023    Lumbar radicular pain 03/22/2023    Prophylactic antibiotic 03/22/2023    Combined form of age-related cataract, both eyes 04/20/2022    Chronic mesenteric ischemia (Multi) 07/10/2020    Facet arthropathy, lumbosacral 12/16/2019    Migraine 09/11/2018    Osteoarthritis of left knee 11/08/2017    Headache 10/01/2015    Cervical radiculitis 01/13/2014    Allergic rhinitis 10/28/2013     "Carpal tunnel syndrome 07/24/2013    Blepharochalasis 11/17/2010    Thoracic facet syndrome 01/29/2007    Brachial neuritis or radiculitis 01/29/2007       Objective:     Vitals:    08/04/25 0900   BP: 100/66   BP Location: Left arm   Patient Position: Sitting   Pulse: 64   Weight: 81 kg (178 lb 9.6 oz)   Height: 1.626 m (5' 4\")      Wt Readings from Last 4 Encounters:   08/04/25 81 kg (178 lb 9.6 oz)   06/16/25 81 kg (178 lb 9.6 oz)   10/17/23 74.6 kg (164 lb 6.4 oz)   10/13/23 74.8 kg (165 lb)           LAB:     Lab Results   Component Value Date    WBC 6.1 10/21/2022    HGB 12.9 10/21/2022    HCT 40.7 10/21/2022     10/21/2022    CHOL 231 (H) 08/12/2023    TRIG 72 08/12/2023    HDL 58.0 08/12/2023    ALT 13 10/21/2022    AST 19 10/21/2022     10/21/2022    K 4.2 10/21/2022     10/21/2022    CREATININE 0.67 10/21/2022    BUN 16 10/21/2022    CO2 28 10/21/2022    TSH 1.50 08/12/2023    INR 1.0 07/29/2021         Physical Exam     General Appearance: alert and oriented to person, place and time, in no acute distress  Cardiovascular: normal rate, regular rhythm, normal S1 and S2, no murmurs, rubs, clicks, or gallops,  no JVD  Pulmonary/Chest: clear to auscultation bilaterally- no wheezes, rales or rhonchi, normal air movement, no respiratory distress  Abdomen: soft, non-tender, non-distended, normal bowel sounds, no masses   Extremities: no cyanosis, clubbing or edema  Skin: warm and dry, no rash or erythema  Eyes: EOMI  Neck: supple and non-tender without mass, no thyromegaly   Neurological: alert, oriented, normal speech, no focal findings or movement disorder noted  Vascular: 2+      Provider attestation-scribe documentation  Any medical record entries made by the scribe were at my discretion and personally dictated by me.  I have reviewed the chart and agree that the record accurately reflects my personal performance of the history, physical exam, discussion and plan.      Scribe " Attestation  By signing my name below, I, Farhana Lacey MA, Joseline   attest that this documentation has been prepared under the direction and in the presence of Po Ortega MD.            [1]   Allergies  Allergen Reactions    Shellfish Containing Products Anaphylaxis and Angioedema    Amoxicillin-Pot Clavulanate Other     Gatrointestinal upset    Cephalexin Unknown    Doxycycline Unknown    Ibandronate Unknown    Lidocaine Unknown    Nickel Unknown     Nickel    Prednisone Unknown     Pt. had chest pain and red facial rash    Other reaction(s): Other: See Comments   Pt. had chest pain and red facial rash    Sertraline Unknown    Simvastatin Unknown    Tramadol Unknown    Adhesive Tape-Silicones Rash    Sulfate Salt Hives and Rash     Sulfate Soaps

## 2025-08-07 ENCOUNTER — TELEPHONE (OUTPATIENT)
Dept: CARDIOLOGY | Facility: CLINIC | Age: 75
End: 2025-08-07
Payer: MEDICARE

## 2025-08-07 DIAGNOSIS — I47.19 PAROXYSMAL ATRIAL TACHYCARDIA: ICD-10-CM

## 2025-08-07 RX ORDER — DILTIAZEM HYDROCHLORIDE 30 MG/1
30 TABLET, FILM COATED ORAL 3 TIMES DAILY
Qty: 270 TABLET | Refills: 1 | Status: SHIPPED | OUTPATIENT
Start: 2025-08-07 | End: 2026-08-07

## 2025-08-07 NOTE — TELEPHONE ENCOUNTER
Patient called the office stating she has broke out in hives/ welts on her face and body since starting Metoprolol Tartrate 25 mg BID. Patient is asking if there is an alternative medication she can replace Metoprolol Tartrate with.   Please advise.   Farhana Lacey MA

## 2025-08-07 NOTE — TELEPHONE ENCOUNTER
Patient aware and agreeable to starting Cardizem 30 MG TID.     Allergy list updated with Metoprolol Tartrate  added.    Cardizem 30 MG pended.

## 2025-08-14 ENCOUNTER — APPOINTMENT (OUTPATIENT)
Dept: CARDIOLOGY | Facility: CLINIC | Age: 75
End: 2025-08-14
Payer: MEDICARE

## 2025-08-14 VITALS
BODY MASS INDEX: 30.7 KG/M2 | WEIGHT: 179.8 LBS | DIASTOLIC BLOOD PRESSURE: 62 MMHG | SYSTOLIC BLOOD PRESSURE: 112 MMHG | HEART RATE: 68 BPM | HEIGHT: 64 IN

## 2025-08-14 DIAGNOSIS — R60.0 LOCALIZED EDEMA: ICD-10-CM

## 2025-08-14 DIAGNOSIS — I47.10 PSVT (PAROXYSMAL SUPRAVENTRICULAR TACHYCARDIA): ICD-10-CM

## 2025-08-14 PROCEDURE — 1159F MED LIST DOCD IN RCRD: CPT | Performed by: INTERNAL MEDICINE

## 2025-08-14 PROCEDURE — G2211 COMPLEX E/M VISIT ADD ON: HCPCS | Performed by: INTERNAL MEDICINE

## 2025-08-14 PROCEDURE — 99214 OFFICE O/P EST MOD 30 MIN: CPT | Performed by: INTERNAL MEDICINE

## 2025-08-14 PROCEDURE — 3008F BODY MASS INDEX DOCD: CPT | Performed by: INTERNAL MEDICINE

## 2025-08-14 RX ORDER — NITROFURANTOIN 25; 75 MG/1; MG/1
100 CAPSULE ORAL 2 TIMES DAILY
COMMUNITY
Start: 2025-08-13 | End: 2025-08-18

## 2025-08-14 RX ORDER — LANSOPRAZOLE 30 MG/1
30 CAPSULE, DELAYED RELEASE ORAL DAILY
COMMUNITY

## 2025-08-14 RX ORDER — METHYLPREDNISOLONE 4 MG/1
4 TABLET ORAL
COMMUNITY
Start: 2025-08-13 | End: 2025-08-19

## 2025-08-14 RX ORDER — DILTIAZEM HYDROCHLORIDE 120 MG/1
120 CAPSULE, COATED, EXTENDED RELEASE ORAL DAILY
Qty: 90 CAPSULE | Refills: 3 | Status: SHIPPED | OUTPATIENT
Start: 2025-08-14 | End: 2026-08-14

## 2025-08-14 RX ORDER — ALBUTEROL SULFATE 90 UG/1
1-2 INHALANT RESPIRATORY (INHALATION) EVERY 6 HOURS PRN
COMMUNITY
Start: 2025-02-26

## 2025-08-14 RX ORDER — FLUTICASONE PROPIONATE 50 MCG
1 SPRAY, SUSPENSION (ML) NASAL 2 TIMES DAILY
COMMUNITY
Start: 2025-05-21

## 2025-08-14 RX ORDER — DULOXETIN HYDROCHLORIDE 30 MG/1
30 CAPSULE, DELAYED RELEASE ORAL
COMMUNITY
Start: 2025-01-23

## 2025-08-25 ENCOUNTER — TELEPHONE (OUTPATIENT)
Dept: CARDIOLOGY | Facility: CLINIC | Age: 75
End: 2025-08-25
Payer: MEDICARE

## 2025-08-25 DIAGNOSIS — I47.19 PAROXYSMAL ATRIAL TACHYCARDIA: ICD-10-CM

## 2025-08-27 RX ORDER — DILTIAZEM HYDROCHLORIDE 30 MG/1
30 TABLET, FILM COATED ORAL 3 TIMES DAILY
Qty: 270 TABLET | Refills: 1 | Status: SHIPPED | OUTPATIENT
Start: 2025-08-27 | End: 2026-02-23

## 2026-02-17 ENCOUNTER — APPOINTMENT (OUTPATIENT)
Dept: CARDIOLOGY | Facility: CLINIC | Age: 76
End: 2026-02-17
Payer: MEDICARE

## (undated) DEVICE — Device: Brand: STABLECUT®

## (undated) DEVICE — HEADLESS TROCHAR PIN 75MM: Brand: ZUK

## (undated) DEVICE — GAUZE,SPONGE,4"X4",12PLY,STERILE,LF,2'S: Brand: MEDLINE

## (undated) DEVICE — 3M™ STERI-DRAPE™ U-DRAPE 1015: Brand: STERI-DRAPE™

## (undated) DEVICE — Z DISCONTINUED USE 2744636  DRESSING AQUACEL 14 IN ALG W3.5XL14IN POLYUR FLM CVR W/ HYDRCOLL

## (undated) DEVICE — GLOVE SURG SZ 75 L12IN FNGR THK94MIL STD WHT LTX FREE

## (undated) DEVICE — SUTURE MCRYL SZ 4-0 L27IN ABSRB UD L19MM PS-2 1/2 CIR PRIM Y426H

## (undated) DEVICE — STERILE PATIENT PROTECTIVE PAD FOR IMP® KNEE POSITIONERS & COHESIVE WRAP (10 / CASE): Brand: DE MAYO KNEE POSITIONER®

## (undated) DEVICE — LABEL MED MINI W/ MARKER

## (undated) DEVICE — 4-PORT MANIFOLD: Brand: NEPTUNE 2

## (undated) DEVICE — PAD THER XL AD MULTIUSE W E STRP WRAPON

## (undated) DEVICE — SIPS DUAL 2 MINUTE TIP

## (undated) DEVICE — SUTURE VCRL SZ 2-0 L36IN ABSRB UD L40MM CT 1/2 CIR J957H

## (undated) DEVICE — GOWN,AURORA,NON-REINFORCED,2XL: Brand: MEDLINE

## (undated) DEVICE — ELASTIC BANDAGE: Brand: DEROYAL

## (undated) DEVICE — PADDING CAST W6INXL4YD COT LO LINTING WYTEX

## (undated) DEVICE — Device

## (undated) DEVICE — APPLICATOR MEDICATED 26 CC SOLUTION HI LT ORNG CHLORAPREP

## (undated) DEVICE — TABLE COVER: Brand: CONVERTORS

## (undated) DEVICE — GLOVE ORANGE PI 7 1/2   MSG9075

## (undated) DEVICE — CATHETERIZATION KIT PEDIATRIC 16 FR 5 CC INDWL STR TIP BG

## (undated) DEVICE — DRAPE SURG EXT FEN REINF ST W O FLD PCH STD

## (undated) DEVICE — SHEET, DRAPE, SPLIT, STERILE: Brand: MEDLINE

## (undated) DEVICE — ADHESIVE SKIN CLSR 0.7ML TOP DERMBND ADV

## (undated) DEVICE — COVER LT HNDL BLU PLAS

## (undated) DEVICE — SUTURE ETHBND EXCEL SZ 2 L30IN NONABSORBABLE GRN L40MM V-37 MX69G

## (undated) DEVICE — SUTURE RETRIEVER

## (undated) DEVICE — PACK PROCEDURE SURG TOT HIP DRP

## (undated) DEVICE — SUTURE VCRL SZ 1 L36IN ABSRB UD L36MM CT-1 1/2 CIR J947H

## (undated) DEVICE — BANDAGE ACE DBL LENGTH 6

## (undated) DEVICE — 2000CC GUARDIAN II: Brand: GUARDIAN

## (undated) DEVICE — CHLORAPREP 26ML ORANGE

## (undated) DEVICE — 450 ML BOTTLE OF 0.05% CHLORHEXIDINE GLUCONATE IN 99.95% STERILE WATER FOR IRRIGATION, USP AND APPLICATOR.: Brand: IRRISEPT ANTIMICROBIAL WOUND LAVAGE

## (undated) DEVICE — SUTURE MCRYL + SZ 3-0 L36IN ABSRB UD CT-1 L36MM 1/2 CIR MCP944H

## (undated) DEVICE — SCREW BNE L25MM DIA2.5MM KNEE FULL THRD HEX FEM PERSONA

## (undated) DEVICE — TAPE MED W3XL2.5YD STRETCHED ELASTIKON

## (undated) DEVICE — UNDERCAST PADDING: Brand: DEROYAL

## (undated) DEVICE — MEDI-VAC YANKAUER SUCTION HANDLE W/BULBOUS TIP: Brand: CARDINAL HEALTH

## (undated) DEVICE — MAT FLR SURG QUICKWICK 28X54 IN DISP

## (undated) DEVICE — GLOVE ORANGE PI 8   MSG9080

## (undated) DEVICE — TOTAL TRAY, DB, 100% SILI FOLEY, 16FR 10: Brand: MEDLINE

## (undated) DEVICE — 3M™ IOBAN™ 2 ANTIMICROBIAL INCISE DRAPE 6650EZ: Brand: IOBAN™ 2

## (undated) DEVICE — BLADE RMR L26MM PAT W/ PILOT H

## (undated) DEVICE — STRAP SECUREMENT L AD W1.25XL2.75IN CATH ADH CATH-SECURE

## (undated) DEVICE — HIGH FLOW TIP

## (undated) DEVICE — SUTURE VCRL SZ 1 L27IN ABSRB UD L36MM CT-1 1/2 CIR JJ40G

## (undated) DEVICE — 3M™ STERI-DRAPE™ INSTRUMENT POUCH 1018: Brand: STERI-DRAPE™

## (undated) DEVICE — PILLOW POS W15XH6XL22IN RASPBERRY FOAM ABD W/ STRP DISP FOR

## (undated) DEVICE — ELECTRODE PT RET AD L9FT HI MOIST COND ADH HYDRGEL CORDED

## (undated) DEVICE — T4 HOOD

## (undated) DEVICE — BANDAGE COMPR W6INXL9FT BLU 1 LAYR NO CLSR ESMARCH

## (undated) DEVICE — BLADE SAW W6.3XL60MM THK0.64MM CUT THK1.04MM REPL SHT RECIP

## (undated) DEVICE — SPLINT ORTH 22IN KNEE BASIC

## (undated) DEVICE — GLOVE ORANGE PI 7   MSG9070